# Patient Record
Sex: FEMALE | Race: WHITE | Employment: UNEMPLOYED | URBAN - METROPOLITAN AREA
[De-identification: names, ages, dates, MRNs, and addresses within clinical notes are randomized per-mention and may not be internally consistent; named-entity substitution may affect disease eponyms.]

---

## 2017-09-25 ENCOUNTER — INITIAL CONSULT (OUTPATIENT)
Dept: PULMONOLOGY | Age: 42
End: 2017-09-25

## 2017-09-25 VITALS
WEIGHT: 142 LBS | DIASTOLIC BLOOD PRESSURE: 52 MMHG | BODY MASS INDEX: 23.66 KG/M2 | HEART RATE: 52 BPM | HEIGHT: 65 IN | RESPIRATION RATE: 14 BRPM | SYSTOLIC BLOOD PRESSURE: 98 MMHG

## 2017-09-25 DIAGNOSIS — R91.8 LUNG INFILTRATE ON CT: ICD-10-CM

## 2017-09-25 DIAGNOSIS — R07.9 CHEST PAIN, UNSPECIFIED TYPE: ICD-10-CM

## 2017-09-25 LAB
DLCO %PRED: NORMAL
DLCO PRE: NORMAL
FEF 25-75%-POST: 2.69
FEF 25-75%-PRE: 2.72
FEV1-POST: 2.75
FEV1-PRE: 2.75
FEV1/FVC-POST: 82.6
FEV1/FVC-PRE: 80.2
FVC-POST: 3.33
FVC-PRE: 3.43
MEP: NORMAL
MIP: NORMAL
TLC %PRED: NORMAL
TLC PRE: NORMAL

## 2017-09-25 PROCEDURE — 99204 OFFICE O/P NEW MOD 45 MIN: CPT | Performed by: INTERNAL MEDICINE

## 2017-09-25 PROCEDURE — 94060 EVALUATION OF WHEEZING: CPT | Performed by: INTERNAL MEDICINE

## 2017-09-25 ASSESSMENT — PULMONARY FUNCTION TESTS
FEV1_POST: 2.75
FEV1_PRE: 2.75
FEV1/FVC_PRE: 80.2
FVC_PRE: 3.43
FEV1/FVC_POST: 82.6
FVC_POST: 3.33

## 2017-10-04 ENCOUNTER — HOSPITAL ENCOUNTER (OUTPATIENT)
Dept: CT IMAGING | Age: 42
Discharge: HOME | End: 2017-10-04
Attending: INTERNAL MEDICINE | Admitting: INTERNAL MEDICINE

## 2017-10-04 DIAGNOSIS — R07.9 CHEST PAIN, UNSPECIFIED TYPE: ICD-10-CM

## 2017-10-04 DIAGNOSIS — R91.8 LUNG INFILTRATE ON CT: ICD-10-CM

## 2017-10-04 DIAGNOSIS — R07.9 CHEST PAIN: ICD-10-CM

## 2017-10-04 RX ORDER — 0.9 % SODIUM CHLORIDE 0.9 %
10 VIAL (ML) INJECTION 2 TIMES DAILY
Status: DISCONTINUED | OUTPATIENT
Start: 2017-10-04 | End: 2017-10-05 | Stop reason: HOSPADM

## 2017-10-04 RX ADMIN — Medication 10 ML: at 09:31

## 2017-10-05 ENCOUNTER — TELEPHONE (OUTPATIENT)
Dept: PULMONOLOGY | Age: 42
End: 2017-10-05

## 2017-10-05 ENCOUNTER — HOSPITAL ENCOUNTER (OUTPATIENT)
Dept: LAB | Age: 42
Discharge: HOME | End: 2017-10-05
Attending: INTERNAL MEDICINE | Admitting: INTERNAL MEDICINE

## 2017-10-05 DIAGNOSIS — R91.8 MULTIPLE LUNG NODULES: Primary | ICD-10-CM

## 2017-10-05 NOTE — TELEPHONE ENCOUNTER
I discussed results of the CT chest with Lili Ghosh over the phone. It does appear that she has a nodular density at the Left lower lobe multiple small nodules in that area and at least one in the upper lobe on the Right side. Because there are no old CT for comparison besides the one that was done at [facility] being a CT abdomen I think we need to follow this nodular density closely. I will obtain serology for histoplasmosis and if negative consider a PET/CT versus repeating her chest CT in 3 months.   Fortunately at this time she remains asymptomatic

## 2017-10-08 LAB
HISTOPLASMA ANTIGEN URINE INTERP: NOT DETECTED
HISTOPLASMA ANTIGEN URINE: NOT DETECTED

## 2017-10-09 ENCOUNTER — TELEPHONE (OUTPATIENT)
Dept: PULMONOLOGY | Age: 42
End: 2017-10-09

## 2017-10-09 DIAGNOSIS — R91.1 PULMONARY NODULE, LEFT: Primary | ICD-10-CM

## 2017-10-10 LAB
ASPERGILLUS ANTIBODY ID: NORMAL
BLASTOMYCES ANTIBODY ID: NORMAL
COCCIDIOIDES ANTIBODY ID: NORMAL
HISTOPLASMA AB, ID: NORMAL

## 2017-10-19 ENCOUNTER — HOSPITAL ENCOUNTER (OUTPATIENT)
Dept: GENERAL RADIOLOGY | Age: 42
Discharge: HOME | End: 2017-10-19
Attending: INTERNAL MEDICINE | Admitting: INTERNAL MEDICINE

## 2017-10-19 ENCOUNTER — HOSPITAL ENCOUNTER (OUTPATIENT)
Dept: PET IMAGING | Age: 42
Discharge: HOME | End: 2017-10-19

## 2017-10-19 DIAGNOSIS — R91.1 PULMONARY NODULE, LEFT: ICD-10-CM

## 2017-10-24 ENCOUNTER — OFFICE VISIT (OUTPATIENT)
Dept: PULMONOLOGY | Age: 42
End: 2017-10-24

## 2017-10-24 VITALS
SYSTOLIC BLOOD PRESSURE: 86 MMHG | HEART RATE: 57 BPM | DIASTOLIC BLOOD PRESSURE: 64 MMHG | WEIGHT: 143 LBS | OXYGEN SATURATION: 98 % | RESPIRATION RATE: 16 BRPM | BODY MASS INDEX: 23.82 KG/M2 | HEIGHT: 65 IN

## 2017-10-24 DIAGNOSIS — N94.9 ADNEXAL CYST: ICD-10-CM

## 2017-10-24 DIAGNOSIS — R91.8 LUNG INFILTRATE ON CT: Primary | ICD-10-CM

## 2017-10-24 DIAGNOSIS — R91.1 LUNG NODULE SEEN ON IMAGING STUDY: ICD-10-CM

## 2017-10-24 PROCEDURE — 99213 OFFICE O/P EST LOW 20 MIN: CPT | Performed by: INTERNAL MEDICINE

## 2017-10-25 ENCOUNTER — HOSPITAL ENCOUNTER (OUTPATIENT)
Dept: LAB | Age: 42
Discharge: HOME | End: 2017-10-25
Attending: OBSTETRICS & GYNECOLOGY | Admitting: OBSTETRICS & GYNECOLOGY

## 2017-10-31 ENCOUNTER — TELEPHONE (OUTPATIENT)
Dept: PULMONOLOGY | Age: 42
End: 2017-10-31

## 2017-11-21 ENCOUNTER — OFFICE VISIT (OUTPATIENT)
Dept: PULMONOLOGY | Age: 42
End: 2017-11-21

## 2017-11-21 VITALS
DIASTOLIC BLOOD PRESSURE: 70 MMHG | SYSTOLIC BLOOD PRESSURE: 110 MMHG | HEART RATE: 57 BPM | HEIGHT: 65 IN | OXYGEN SATURATION: 98 % | RESPIRATION RATE: 20 BRPM | WEIGHT: 143 LBS | BODY MASS INDEX: 23.82 KG/M2

## 2017-11-21 DIAGNOSIS — R91.8 LUNG INFILTRATE ON CT: ICD-10-CM

## 2017-11-21 DIAGNOSIS — R91.1 LUNG NODULE SEEN ON IMAGING STUDY: ICD-10-CM

## 2017-11-21 DIAGNOSIS — N94.9 ADNEXAL CYST: ICD-10-CM

## 2017-11-21 DIAGNOSIS — K20.90 ACUTE ESOPHAGITIS: Primary | ICD-10-CM

## 2017-11-21 DIAGNOSIS — R07.9 CHEST PAIN, UNSPECIFIED TYPE: ICD-10-CM

## 2017-11-21 PROCEDURE — 99213 OFFICE O/P EST LOW 20 MIN: CPT | Performed by: INTERNAL MEDICINE

## 2017-11-21 NOTE — PROGRESS NOTES
SUBJECTIVE:Chief complaintAnterior chest pain     Gary Keith states that about a week ago she developed a rash of unknown etiology. She was started on prednisone 20 mg twice a day for 5 days. The rash has faded but then last night she noted the onset of severe anterior nonpleuritic chest pain which did not radiate and did awaken her from sleep. She does not have a past history of reflux or hiatal hernia. The pain slowly faded over an hour. Of course she is concerned because of her recent finding of a nodular left lower lobe infiltrate. She also is undergoing a workup for her adnexal mass. She states that over the weekend she had a slight cough and thought she had a head cold but was not expectorating sputum and denies hemoptysis    OBJECTIVE:  /70   Pulse 57   Resp 20   Ht 5' 4.5\" (1.638 m)   Wt 143 lb (64.9 kg)   LMP 10/02/2017   SpO2 98%   BMI 24.17 kg/m²      Physical Exam:  Constitutional:  She appears well developed and well-nourished. Neck:  Supple, No palpable lymphadenopathy, No JVD  Cardiovascular:  S1, S2 Normal, Regular rhythm, no murmurs or gallops, No pericardial  rubs. Pulmonary:  Breath sounds are clear throughout. I hear no wheezing or rhonchi  Abdomen: Mild epigastric tenderness on palpation, No distention. No organomegaly   Extremities: no edema, No DVT  Neurologic:  Awake and Alert, No focal deficits             ASSESSMENT:    1. Acute esophagitis    2. Lung infiltrate on CT    3. Lung nodule seen on imaging study    4. Adnexal cyst    5. Chest pain, unspecified type          PLAN:  I gave her 3 Tums to take and told her to get Prilosec OTC 20 mg once a day for the next 2-4 weeks and to use Tums on an as-needed basis since I think this pain most likely is esophageal in origin. If her pain persists I'll see her back in repeat her chest imaging.   This does not sound like cardiac pain but this would be in the differential and if the pain recurs then I think she would need a cardiac workup  No Follow-up on file. This dictation was performed with a verbal recognition program and it was checked for errors. It is possible that there are still dictated errors within this office note. Any errors should be brought immediately to my attention for correction. All efforts were made to ensure that this office note is accurate.

## 2018-01-22 ENCOUNTER — TELEPHONE (OUTPATIENT)
Dept: PULMONOLOGY | Age: 43
End: 2018-01-22

## 2018-01-22 DIAGNOSIS — R91.8 LUNG INFILTRATE ON CT: Primary | ICD-10-CM

## 2018-02-01 ENCOUNTER — HOSPITAL ENCOUNTER (OUTPATIENT)
Dept: CT IMAGING | Age: 43
Discharge: HOME | End: 2018-02-01
Attending: INTERNAL MEDICINE | Admitting: INTERNAL MEDICINE

## 2018-02-01 ENCOUNTER — TELEPHONE (OUTPATIENT)
Dept: PULMONOLOGY | Age: 43
End: 2018-02-01

## 2018-02-01 DIAGNOSIS — R91.8 LUNG INFILTRATE: ICD-10-CM

## 2018-02-01 DIAGNOSIS — R91.8 OTHER NONSPECIFIC ABNORMAL FINDING OF LUNG FIELD (CODE): ICD-10-CM

## 2018-05-29 ENCOUNTER — TELEPHONE (OUTPATIENT)
Dept: PULMONOLOGY | Age: 43
End: 2018-05-29

## 2018-10-23 ENCOUNTER — TELEPHONE (OUTPATIENT)
Dept: PULMONOLOGY | Age: 43
End: 2018-10-23

## 2018-10-23 DIAGNOSIS — R91.8 INFILTRATE OF LOWER LOBE OF LEFT LUNG PRESENT ON IMAGING STUDY: Primary | ICD-10-CM

## 2018-11-05 ENCOUNTER — HOSPITAL ENCOUNTER (OUTPATIENT)
Dept: CT IMAGING | Age: 43
Discharge: HOME | End: 2018-11-05
Payer: COMMERCIAL

## 2018-11-05 ENCOUNTER — TELEPHONE (OUTPATIENT)
Dept: PULMONOLOGY | Age: 43
End: 2018-11-05

## 2018-11-05 DIAGNOSIS — R91.8 INFILTRATE OF LOWER LOBE OF LEFT LUNG PRESENT ON IMAGING STUDY: ICD-10-CM

## 2018-11-05 PROCEDURE — 71250 CT THORAX DX C-: CPT

## 2018-11-05 NOTE — TELEPHONE ENCOUNTER
I spoke to Magdy Sosa over the phone concerning her most recent CT chest on 11/5/18. The multiple nodularity in the left lower lung field has remained unchanged. Because of that.  I will plan on repeating her CT chest later next year and no further interventions necessary at this time

## 2019-04-29 ENCOUNTER — TELEPHONE (OUTPATIENT)
Dept: PULMONOLOGY | Age: 44
End: 2019-04-29

## 2019-05-06 ENCOUNTER — OFFICE VISIT (OUTPATIENT)
Dept: PULMONOLOGY | Age: 44
End: 2019-05-06
Payer: COMMERCIAL

## 2019-05-06 VITALS
OXYGEN SATURATION: 99 % | SYSTOLIC BLOOD PRESSURE: 102 MMHG | BODY MASS INDEX: 22.66 KG/M2 | WEIGHT: 136 LBS | HEIGHT: 65 IN | HEART RATE: 77 BPM | DIASTOLIC BLOOD PRESSURE: 70 MMHG

## 2019-05-06 DIAGNOSIS — R91.1 LUNG NODULE SEEN ON IMAGING STUDY: Primary | ICD-10-CM

## 2019-05-06 DIAGNOSIS — R91.8 LUNG INFILTRATE ON CT: ICD-10-CM

## 2019-05-06 PROCEDURE — 99213 OFFICE O/P EST LOW 20 MIN: CPT | Performed by: INTERNAL MEDICINE

## 2019-05-06 NOTE — PROGRESS NOTES
SUBJECTIVE:  Chief Complaint: Left lower lobe lung nodule/infiltrate  It has been over a year since we started following this atypical left lower lobe nodular infiltrate. Nanette Lemus remains asymptomatic but has lost weight and does not have a good explanation. She denies chest pain or chest discomfort. She denies shortness of breath. She has had no fever or chills. OBJECTIVE:  /70   Pulse 77   Ht 5' 4.5\" (1.638 m)   Wt 136 lb (61.7 kg)   SpO2 99%   Breastfeeding? No   BMI 22.98 kg/m²      Physical Exam:  Constitutional:  She appears well developed and well-nourished. Neck:  Supple, No palpable lymphadenopathy, No JVD  Cardiovascular:  S1, S2 Normal, Regular rhythm, no murmurs or gallops, No pericardial  rubs. Pulmonary:  Breath sounds are clear throughout. I hear no wheezing or rhonchi  Abdomen: No epigastric pain, No distention. No organomegaly   Extremities: no edema, No DVT  Neurologic:  Awake and Alert, No focal deficits    Radiology: CT chest last done 11/5/18 showed stable pulmonary nodules within the left lung with no acute pulmonary abnormality  PFT: Office spirometry last done on 9/25/17 demonstrated no significant airways obstruction and no significant response to bronchodilators        ASSESSMENT:    1. Lung nodule seen on imaging study    2. Lung infiltrate on CT          PLAN:   I would like to repeat her CT chest without contrast in the near future. I will continue to follow this nodule for at least 2-3 years. Return in about 6 months (around 11/6/2019) for recheck for lung nodule. This dictation was performed with a verbal recognition program and it was checked for errors. It is possible that there are still dictated errors within this office note. Any errors should be brought immediately to my attention for correction. All efforts were made to ensure that this office note is accurate.

## 2019-05-27 ENCOUNTER — APPOINTMENT (OUTPATIENT)
Dept: ULTRASOUND IMAGING | Age: 44
DRG: 329 | End: 2019-05-27
Payer: COMMERCIAL

## 2019-05-27 ENCOUNTER — APPOINTMENT (OUTPATIENT)
Dept: CT IMAGING | Age: 44
DRG: 329 | End: 2019-05-27
Payer: COMMERCIAL

## 2019-05-27 ENCOUNTER — HOSPITAL ENCOUNTER (INPATIENT)
Age: 44
LOS: 10 days | Discharge: HOME | DRG: 329 | End: 2019-06-08
Attending: EMERGENCY MEDICINE | Admitting: SURGERY
Payer: COMMERCIAL

## 2019-05-27 DIAGNOSIS — N83.201 HEMORRHAGIC CYST OF RIGHT OVARY: ICD-10-CM

## 2019-05-27 DIAGNOSIS — R10.0 ACUTE ABDOMEN: ICD-10-CM

## 2019-05-27 DIAGNOSIS — K57.80 PERFORATED DIVERTICULUM: Primary | ICD-10-CM

## 2019-05-27 DIAGNOSIS — K57.20 PERFORATED DIVERTICULUM OF LARGE INTESTINE: ICD-10-CM

## 2019-05-27 DIAGNOSIS — K56.7 ILEUS (HCC): ICD-10-CM

## 2019-05-27 LAB
ALBUMIN SERPL-MCNC: 3.7 GM/DL (ref 3.4–5)
ALP BLD-CCNC: 51 IU/L (ref 40–129)
ALT SERPL-CCNC: 11 U/L (ref 10–40)
ANION GAP SERPL CALCULATED.3IONS-SCNC: 14 MMOL/L (ref 4–16)
AST SERPL-CCNC: 14 IU/L (ref 15–37)
BACTERIA: NEGATIVE /HPF
BASOPHILS ABSOLUTE: 0 K/CU MM
BASOPHILS RELATIVE PERCENT: 0.2 % (ref 0–1)
BILIRUB SERPL-MCNC: 1.2 MG/DL (ref 0–1)
BILIRUBIN URINE: NEGATIVE MG/DL
BLOOD, URINE: ABNORMAL
BUN BLDV-MCNC: 14 MG/DL (ref 6–23)
CALCIUM SERPL-MCNC: 9.1 MG/DL (ref 8.3–10.6)
CHLORIDE BLD-SCNC: 102 MMOL/L (ref 99–110)
CLARITY: CLEAR
CO2: 22 MMOL/L (ref 21–32)
COLOR: YELLOW
CREAT SERPL-MCNC: 0.7 MG/DL (ref 0.6–1.1)
DIFFERENTIAL TYPE: ABNORMAL
EOSINOPHILS ABSOLUTE: 0 K/CU MM
EOSINOPHILS RELATIVE PERCENT: 0.2 % (ref 0–3)
GFR AFRICAN AMERICAN: >60 ML/MIN/1.73M2
GFR NON-AFRICAN AMERICAN: >60 ML/MIN/1.73M2
GLUCOSE BLD-MCNC: 102 MG/DL (ref 70–99)
GLUCOSE, URINE: NEGATIVE MG/DL
HCG QUALITATIVE: NEGATIVE
HCT VFR BLD CALC: 42.4 % (ref 37–47)
HEMOGLOBIN: 13 GM/DL (ref 12.5–16)
IMMATURE NEUTROPHIL %: 0.5 % (ref 0–0.43)
KETONES, URINE: ABNORMAL MG/DL
LEUKOCYTE ESTERASE, URINE: ABNORMAL
LIPASE: 11 IU/L (ref 13–60)
LYMPHOCYTES ABSOLUTE: 0.8 K/CU MM
LYMPHOCYTES RELATIVE PERCENT: 5 % (ref 24–44)
MCH RBC QN AUTO: 32.3 PG (ref 27–31)
MCHC RBC AUTO-ENTMCNC: 30.7 % (ref 32–36)
MCV RBC AUTO: 105.2 FL (ref 78–100)
MONOCYTES ABSOLUTE: 0.6 K/CU MM
MONOCYTES RELATIVE PERCENT: 3.8 % (ref 0–4)
MUCUS: ABNORMAL HPF
NITRITE URINE, QUANTITATIVE: NEGATIVE
NUCLEATED RBC %: 0 %
PDW BLD-RTO: 12.3 % (ref 11.7–14.9)
PH, URINE: 8 (ref 5–8)
PLATELET # BLD: 206 K/CU MM (ref 140–440)
PMV BLD AUTO: 10.5 FL (ref 7.5–11.1)
POTASSIUM SERPL-SCNC: 3.8 MMOL/L (ref 3.5–5.1)
PROTEIN UA: 30 MG/DL
RBC # BLD: 4.03 M/CU MM (ref 4.2–5.4)
RBC URINE: 1 /HPF (ref 0–6)
SEGMENTED NEUTROPHILS ABSOLUTE COUNT: 15.1 K/CU MM
SEGMENTED NEUTROPHILS RELATIVE PERCENT: 90.3 % (ref 36–66)
SODIUM BLD-SCNC: 138 MMOL/L (ref 135–145)
SPECIFIC GRAVITY UA: 1.02 (ref 1–1.03)
SQUAMOUS EPITHELIAL: 4 /HPF
TOTAL IMMATURE NEUTOROPHIL: 0.09 K/CU MM
TOTAL NUCLEATED RBC: 0 K/CU MM
TOTAL PROTEIN: 6.9 GM/DL (ref 6.4–8.2)
TRICHOMONAS: ABNORMAL /HPF
UROBILINOGEN, URINE: NORMAL MG/DL (ref 0.2–1)
WBC # BLD: 16.7 K/CU MM (ref 4–10.5)
WBC UA: 14 /HPF (ref 0–5)

## 2019-05-27 PROCEDURE — 2580000003 HC RX 258: Performed by: SURGERY

## 2019-05-27 PROCEDURE — 2580000003 HC RX 258: Performed by: PHYSICIAN ASSISTANT

## 2019-05-27 PROCEDURE — 96374 THER/PROPH/DIAG INJ IV PUSH: CPT

## 2019-05-27 PROCEDURE — 83690 ASSAY OF LIPASE: CPT

## 2019-05-27 PROCEDURE — 96365 THER/PROPH/DIAG IV INF INIT: CPT

## 2019-05-27 PROCEDURE — G0378 HOSPITAL OBSERVATION PER HR: HCPCS

## 2019-05-27 PROCEDURE — 96361 HYDRATE IV INFUSION ADD-ON: CPT

## 2019-05-27 PROCEDURE — 74177 CT ABD & PELVIS W/CONTRAST: CPT

## 2019-05-27 PROCEDURE — 80053 COMPREHEN METABOLIC PANEL: CPT

## 2019-05-27 PROCEDURE — 81001 URINALYSIS AUTO W/SCOPE: CPT

## 2019-05-27 PROCEDURE — 84703 CHORIONIC GONADOTROPIN ASSAY: CPT

## 2019-05-27 PROCEDURE — 96375 TX/PRO/DX INJ NEW DRUG ADDON: CPT

## 2019-05-27 PROCEDURE — 36415 COLL VENOUS BLD VENIPUNCTURE: CPT

## 2019-05-27 PROCEDURE — 2580000003 HC RX 258

## 2019-05-27 PROCEDURE — 96376 TX/PRO/DX INJ SAME DRUG ADON: CPT

## 2019-05-27 PROCEDURE — 6360000004 HC RX CONTRAST MEDICATION: Performed by: PHYSICIAN ASSISTANT

## 2019-05-27 PROCEDURE — 99285 EMERGENCY DEPT VISIT HI MDM: CPT

## 2019-05-27 PROCEDURE — 85025 COMPLETE CBC W/AUTO DIFF WBC: CPT

## 2019-05-27 PROCEDURE — 76830 TRANSVAGINAL US NON-OB: CPT

## 2019-05-27 PROCEDURE — 6360000002 HC RX W HCPCS: Performed by: SURGERY

## 2019-05-27 PROCEDURE — 93975 VASCULAR STUDY: CPT

## 2019-05-27 PROCEDURE — 6360000002 HC RX W HCPCS: Performed by: PHYSICIAN ASSISTANT

## 2019-05-27 RX ORDER — HYDROMORPHONE HCL 110MG/55ML
1 PATIENT CONTROLLED ANALGESIA SYRINGE INTRAVENOUS
Status: DISCONTINUED | OUTPATIENT
Start: 2019-05-27 | End: 2019-05-27

## 2019-05-27 RX ORDER — HYDROMORPHONE HCL 110MG/55ML
0.5 PATIENT CONTROLLED ANALGESIA SYRINGE INTRAVENOUS
Status: DISCONTINUED | OUTPATIENT
Start: 2019-05-27 | End: 2019-05-27

## 2019-05-27 RX ORDER — DIPHENHYDRAMINE HCL 25 MG
25 TABLET ORAL ONCE
Status: DISCONTINUED | OUTPATIENT
Start: 2019-05-27 | End: 2019-05-27

## 2019-05-27 RX ORDER — KETOROLAC TROMETHAMINE 30 MG/ML
30 INJECTION, SOLUTION INTRAMUSCULAR; INTRAVENOUS EVERY 6 HOURS
Status: DISCONTINUED | OUTPATIENT
Start: 2019-05-27 | End: 2019-05-27

## 2019-05-27 RX ORDER — MORPHINE SULFATE 4 MG/ML
4 INJECTION, SOLUTION INTRAMUSCULAR; INTRAVENOUS EVERY 30 MIN PRN
Status: DISCONTINUED | OUTPATIENT
Start: 2019-05-27 | End: 2019-05-27

## 2019-05-27 RX ORDER — 0.9 % SODIUM CHLORIDE 0.9 %
1000 INTRAVENOUS SOLUTION INTRAVENOUS ONCE
Status: COMPLETED | OUTPATIENT
Start: 2019-05-27 | End: 2019-05-27

## 2019-05-27 RX ORDER — KETOROLAC TROMETHAMINE 30 MG/ML
30 INJECTION, SOLUTION INTRAMUSCULAR; INTRAVENOUS ONCE
Status: COMPLETED | OUTPATIENT
Start: 2019-05-27 | End: 2019-05-27

## 2019-05-27 RX ORDER — ACETAMINOPHEN 325 MG/1
650 TABLET ORAL EVERY 4 HOURS PRN
Status: DISCONTINUED | OUTPATIENT
Start: 2019-05-27 | End: 2019-06-08 | Stop reason: HOSPADM

## 2019-05-27 RX ORDER — HYDROMORPHONE HCL 110MG/55ML
PATIENT CONTROLLED ANALGESIA SYRINGE INTRAVENOUS
Status: DISPENSED
Start: 2019-05-27 | End: 2019-05-28

## 2019-05-27 RX ORDER — HYDROMORPHONE HCL 110MG/55ML
0.5 PATIENT CONTROLLED ANALGESIA SYRINGE INTRAVENOUS
Status: DISCONTINUED | OUTPATIENT
Start: 2019-05-27 | End: 2019-05-30

## 2019-05-27 RX ORDER — KETOROLAC TROMETHAMINE 30 MG/ML
30 INJECTION, SOLUTION INTRAMUSCULAR; INTRAVENOUS EVERY 6 HOURS
Status: DISCONTINUED | OUTPATIENT
Start: 2019-05-27 | End: 2019-05-30

## 2019-05-27 RX ORDER — METOCLOPRAMIDE HYDROCHLORIDE 5 MG/ML
10 INJECTION INTRAMUSCULAR; INTRAVENOUS EVERY 6 HOURS PRN
Status: DISCONTINUED | OUTPATIENT
Start: 2019-05-27 | End: 2019-06-08 | Stop reason: HOSPADM

## 2019-05-27 RX ORDER — SODIUM CHLORIDE, SODIUM LACTATE, POTASSIUM CHLORIDE, CALCIUM CHLORIDE 600; 310; 30; 20 MG/100ML; MG/100ML; MG/100ML; MG/100ML
INJECTION, SOLUTION INTRAVENOUS CONTINUOUS
Status: DISCONTINUED | OUTPATIENT
Start: 2019-05-27 | End: 2019-05-30

## 2019-05-27 RX ORDER — ONDANSETRON 2 MG/ML
4 INJECTION INTRAMUSCULAR; INTRAVENOUS EVERY 4 HOURS PRN
Status: DISCONTINUED | OUTPATIENT
Start: 2019-05-27 | End: 2019-05-30

## 2019-05-27 RX ORDER — PANTOPRAZOLE SODIUM 40 MG/10ML
40 INJECTION, POWDER, LYOPHILIZED, FOR SOLUTION INTRAVENOUS DAILY
Status: DISCONTINUED | OUTPATIENT
Start: 2019-05-28 | End: 2019-06-08 | Stop reason: HOSPADM

## 2019-05-27 RX ORDER — ONDANSETRON 2 MG/ML
4 INJECTION INTRAMUSCULAR; INTRAVENOUS EVERY 30 MIN PRN
Status: DISCONTINUED | OUTPATIENT
Start: 2019-05-27 | End: 2019-05-27

## 2019-05-27 RX ORDER — 0.9 % SODIUM CHLORIDE 0.9 %
10 VIAL (ML) INJECTION
Status: COMPLETED | OUTPATIENT
Start: 2019-05-27 | End: 2019-05-27

## 2019-05-27 RX ORDER — SODIUM CHLORIDE, SODIUM LACTATE, POTASSIUM CHLORIDE, CALCIUM CHLORIDE 600; 310; 30; 20 MG/100ML; MG/100ML; MG/100ML; MG/100ML
INJECTION, SOLUTION INTRAVENOUS
Status: COMPLETED
Start: 2019-05-27 | End: 2019-05-27

## 2019-05-27 RX ADMIN — ONDANSETRON 4 MG: 2 INJECTION INTRAMUSCULAR; INTRAVENOUS at 16:56

## 2019-05-27 RX ADMIN — SODIUM CHLORIDE 10 ML: 9 INJECTION, SOLUTION INTRAMUSCULAR; INTRAVENOUS; SUBCUTANEOUS at 15:54

## 2019-05-27 RX ADMIN — SODIUM CHLORIDE 1000 ML: 9 INJECTION, SOLUTION INTRAVENOUS at 16:56

## 2019-05-27 RX ADMIN — ONDANSETRON 4 MG: 2 INJECTION INTRAMUSCULAR; INTRAVENOUS at 23:25

## 2019-05-27 RX ADMIN — IOPAMIDOL 75 ML: 755 INJECTION, SOLUTION INTRAVENOUS at 15:54

## 2019-05-27 RX ADMIN — HYDROMORPHONE HYDROCHLORIDE 1 MG: 2 INJECTION, SOLUTION INTRAMUSCULAR; INTRAVENOUS; SUBCUTANEOUS at 18:23

## 2019-05-27 RX ADMIN — KETOROLAC TROMETHAMINE 30 MG: 30 INJECTION, SOLUTION INTRAMUSCULAR; INTRAVENOUS at 18:22

## 2019-05-27 RX ADMIN — SODIUM CHLORIDE, POTASSIUM CHLORIDE, SODIUM LACTATE AND CALCIUM CHLORIDE: 600; 310; 30; 20 INJECTION, SOLUTION INTRAVENOUS at 18:30

## 2019-05-27 RX ADMIN — PIPERACILLIN SODIUM AND TAZOBACTAM SODIUM 3.38 G: 3; .375 INJECTION, POWDER, FOR SOLUTION INTRAVENOUS at 18:21

## 2019-05-27 RX ADMIN — SODIUM CHLORIDE, POTASSIUM CHLORIDE, SODIUM LACTATE AND CALCIUM CHLORIDE: 600; 310; 30; 20 INJECTION, SOLUTION INTRAVENOUS at 19:59

## 2019-05-27 RX ADMIN — MORPHINE SULFATE 4 MG: 4 INJECTION INTRAVENOUS at 16:56

## 2019-05-27 ASSESSMENT — PAIN DESCRIPTION - ORIENTATION: ORIENTATION: LOWER

## 2019-05-27 ASSESSMENT — PAIN DESCRIPTION - PAIN TYPE: TYPE: ACUTE PAIN

## 2019-05-27 ASSESSMENT — PAIN SCALES - GENERAL
PAINLEVEL_OUTOF10: 8
PAINLEVEL_OUTOF10: 9
PAINLEVEL_OUTOF10: 7
PAINLEVEL_OUTOF10: 8

## 2019-05-27 ASSESSMENT — PAIN DESCRIPTION - LOCATION: LOCATION: ABDOMEN

## 2019-05-27 NOTE — H&P
History and Physical    Patient Name: Tamiko Cross                              YOB: 1975  Exam Date: 5/27/2019    PCP:  Ivis Khoury MD           Referring Physician:  ER    Chief Complaint:  Perforated diverticultis    History of Present Illness: The patient is a 38 y.o. female who started with acute onset of pain in the lower abdomen last night. Onset was sudden and she became nauseated immediately. She took some ibuprofen without any relief. Her pain persisted and continued to worsen and she became nauseated. She had episodes of sweats and chills and dizziness. She drove herself to the ER for evaluation.      Past Medical History:   Diagnosis Date    Acute esophagitis 11/21/2017    Adnexal cyst 10/24/2017    Chest pain 09/25/2017    \"a couple of yrs ago had some chest pain- did echo and everything was fine\"    History of kidney stones     \"3 or 4 yrs ago- tx with Lithotripsy- had to do two in a row\"/10/2017\"Pet scan did show I have 2 small stones now\"    Lung infiltrate on CT 9/25/2017    Lung nodule seen on imaging study 10/24/2017    scheduled for lung bx 10/25/2017\"\"[address] had side flank pain- thought kidney stone- had CT scan done 9/2017-showed shadow on left lower lung- sent to Dr Sean Thorne- did CT of chest showed nodule in both lungs- multiple on left an one right side\"    Prolonged emergence from general anesthesia     \"do have trouble waking up after surgery\"      Past Surgical History:   Procedure Laterality Date    LITHOTRIPSY  2013   [address]      Prior to Admission Medications  None    Allergies   Allergen Reactions    Tetracyclines & Related Rash        Social History     Tobacco Use    Smoking status: Never Smoker    Smokeless tobacco: Never Used   Substance Use Topics    Alcohol use: No      Family History   Problem Relation Age of Onset    Stroke Father         TIA    High Blood Pressure Father       Physical Exam:  /64   Pulse 71   Temp 98.4 °F (36.9 °C) (Oral)   Resp 16   Ht 5' 4.5\" (1.638 m)   Wt 127 lb (57.6 kg)   LMP 05/24/2019   SpO2 100%   BMI 21.46 kg/m²   Pt is awake, alert, oriented to person, place and time, appropriate with exam  HEENT:  Has non-icteric sclerae, no JVD  CTA bilaterally, with good excursion  RRR, no murmurs appreciated  ABDOMEN:  Soft, tender LLQ and supra-pubic, +rebound, +peritonitis, focal guarding  Ext:  Warm    Imaging:  CT Scan:  Yes, 5/27/2019  Impression   1. Colonic diverticulosis with stranding and inflammatory changes noted   within the left lower quadrant of the abdomen. Wilhelmena Rasher is also an adjacent   short segment of small bowel demonstrating wall thickening with mild   prominence of the more proximal small bowel which is favored to reflect ileus   given the inflammatory changes within the pelvis.  Small amount of   pneumoperitoneum also identified within the abdomen.  Constellation of   findings is most suggestive of perforated diverticulitis.  No organized   drainable fluid collection identified. 2. The appendix is not identified with confidence, however, what is favored   to reflect the appendix is normal in caliber with no surrounding inflammatory   changes identified within the right lower quadrant to suggest acute   appendicitis. Labs:  CBC:    Lab Results   Component Value Date    WBC 16.7 05/27/2019    HGB 13.0 05/27/2019    HCT 42.4 05/27/2019     05/27/2019     BMP:    Lab Results   Component Value Date     05/27/2019    K 3.8 05/27/2019     05/27/2019    CO2 22 05/27/2019    BUN 14 05/27/2019    CREATININE 0.7 05/27/2019    CALCIUM 9.1 05/27/2019     LFT:    Lab Results   Component Value Date    LABALBU 3.7 05/27/2019    BILITOT 1.2 05/27/2019    AST 14 05/27/2019    ALT 11 05/27/2019    ALKPHOS 51 05/27/2019       Assessment and Plan:  The patient presents with signs and symptoms consistent with complicated/perforated diverticultis.  Will admit and keep NPO

## 2019-05-27 NOTE — ED NOTES
Report given to Jovanny Simpson. No further querstions at this time.      Billy Greer RN  05/27/19 9664

## 2019-05-27 NOTE — ED PROVIDER NOTES
Minutes per session: None    Stress: None   Relationships    Social connections:     Talks on phone: None     Gets together: None     Attends Scientologist service: None     Active member of club or organization: None     Attends meetings of clubs or organizations: None     Relationship status: None    Intimate partner violence:     Fear of current or ex partner: None     Emotionally abused: None     Physically abused: None     Forced sexual activity: None   Other Topics Concern    None   Social History Narrative    None     Family History   Problem Relation Age of Onset    Stroke Father         TIA    High Blood Pressure Father        PHYSICAL EXAM    VITAL SIGNS: /64   Pulse 71   Temp 98.4 °F (36.9 °C) (Oral)   Resp 16   Ht 5' 4.5\" (1.638 m)   Wt 127 lb (57.6 kg)   LMP 05/24/2019   SpO2 100%   BMI 21.46 kg/m²   General:  Well developed, thin well nourished female, mildly uncomfortable appearing, nontoxic   Eyes:  Sclera nonicteric, Conjunctiva moist, No discharge  Head:  Normocephalic, Atramautic  Neck/Lymphatics: Supple, no JVD, no swollen nodes  Respiratory:  Clear to ausculation bilaterally, No retractions, Non labored breathing  Cardiovascular:  RRR, No murmurs/gallops/thrills  GI:   No gross discoloration. Bowel sounds present in all quadrants, No audible bruits. Soft,  Nondistended. Moderate to severe suprapubic and right lower quadrant abdominal tenderness with guarding. Mild tenderness in the left lower quadrant. Negative hepatosplenomegaly, negative Daniels sign. No palpable pulsatile masses or obvious hernias. Back:  No CVA tenderness to percussion.   Musculoskeletal:  No edema, No deformity  Peripheral Vascular: Distal pulses 2+ equal bilaterally  Integument: No rash, Normal turgor  Neurologic:  Alert & oriented, Normal speech  Psychiatric: Cooperative, pleasant affect       I have reviewed and interpreted all of the currently available lab results from this visit (if Urine 8.0 5.0 - 8.0    Protein, UA 30 (A) NEGATIVE MG/DL    Urobilinogen, Urine NORMAL 0.2 - 1.0 MG/DL    Nitrite Urine, Quantitative NEGATIVE NEGATIVE    Leukocyte Esterase, Urine TRACE (A) NEGATIVE    RBC, UA 1 0 - 6 /HPF    WBC, UA 14 (H) 0 - 5 /HPF    Bacteria, UA NEGATIVE NEGATIVE /HPF    Squam Epithel, UA 4 /HPF    Mucus, UA RARE (A) NEGATIVE HPF    Trichomonas, UA NONE SEEN NONE SEEN /HPF   HCG Qualitative, Serum   Result Value Ref Range    hCG Qual NEGATIVE         RADIOLOGY/PROCEDURES            US DUP ABD PEL RETRO SCROT COMPLETE (Final result)   Result time 05/27/19 17:09:04   Final result by Fritz Warner MD (05/27/19 17:09:04)                Impression:    Retroverted uterus. Hemorrhagic appearing right ovarian cyst 1.7 cm x 1.4 cm x 1.8 cm.  No  follow-up required. Nonvisualization left ovary. Narrative:    EXAMINATION:  PELVIC ULTRASOUND    5/27/2019    TECHNIQUE:  Transvaginal pelvic ultrasound was performed.  Color Doppler evaluation was  performed. COMPARISON:  None    HISTORY:  ORDERING SYSTEM PROVIDED HISTORY: right pelvic pain, h/o partial  hysterectomy, eval for torsion  TECHNOLOGIST PROVIDED HISTORY:  Acuity: Acute  Type of Exam: Initial    FINDINGS:    Measurements:    Uterus:  7.9 x 4.9 x 5.9 cm    Endometrial stripe:  6 mm      Ultrasound Findings:    Uterus: Uterus demonstrates normal myometrial echotexture.  The uterus is  retroverted. Endometrial stripe: Endometrial stripe is within normal limits. Right Ovary: The right ovary demonstrates a complex hemorrhagic appearing  cyst measuring 1.7 x 1.4 x 1.8 cm.  There is normal venous and arterial flow  to the ovary.     Left Ovary:  Not identified    Free Fluid: No evidence of free fluid.                      US NON OB TRANSVAGINAL (Edited Result - FINAL)   Result time 05/27/19 17:09:04   Edited Result - FINAL by Fritz Warner MD (05/27/19 17:09:04)                Impression:    Retroverted with no surrounding inflammatory change identified. No inflammatory process of the right lower quadrant to suggest acute  appendicitis.  Note is made of colonic diverticulosis with stranding and  inflammatory changes throughout the left pelvis.  Adjacent loop of small  bowel with focal circumferential wall thickening is also noted with mildly  prominent small bowel which is normal in diameter measuring up to 3 cm. Additionally, several locules of free intraperitoneal gas are noted within  the abdomen predominantly anteriorly and within the superior abdomen. Findings most suggestive of acute perforated diverticulitis with possible  reactive inflammatory changes of the adjacent small bowel and mild ileus.  No  organized drainable fluid collections are identified. Pelvis: The urinary bladder appears unremarkable.  The pelvic organs  demonstrate no acute abnormality. Peritoneum/Retroperitoneum: The abdominal aorta is normal in caliber with no  significant atherosclerotic disease.  No retroperitoneal adenopathy.  No  significant free fluid identified within the abdomen.  A small amount of  pneumoperitoneum is present. Bones/Soft Tissues: No significant bony or soft tissue abnormality is  demonstrated.                          ED COURSE & MEDICAL DECISION MAKING      Vital signs and nursing notes reviewed during ED course. I have independently evaluated this patient . Supervising MD - Dr Luisito Galan - present in the Emergency Department, available for consultation, throughout entirety of  patient care. All pertinent Lab data and radiographic results reviewed with patient at bedside. The patient and / or the family were informed of the results of any tests, a time was given to answer questions, a plan was proposed and they agreed with plan.          Differential diagnosis: Abdominal Aortic Aneurysm, Ischemic Bowel, Bowel Obstruction, Acute Cholecystitis, Acute Appendicitis, other    Clinical  IMPRESSION

## 2019-05-27 NOTE — ED PROVIDER NOTES
I independently examined and evaluated Josué Tejeda. In brief, Patient here with abdominal pain, lower. Focused exam revealed Resting comfortably, abdomen is nondistended. ED course: Workup initiated she has a leukocytosis of 16.7, CT shows diverticulitis with a small perforation, antibiotics initiated surgery notified she will be admitted for further workup and treatment. All diagnostic, treatment, and disposition decisions were made by myself in conjunction with the advanced practice provider. For all further details of the patient's emergency department visit, please see the advanced practice provider's documentation. Comment: Please note this report has been produced using speech recognition software and may contain errors related to that system including errors in grammar, punctuation, and spelling, as well as words and phrases that may be inappropriate. If there are any questions or concerns please feel free to contact the dictating provider for clarification.         Monse Mckeon MD  05/27/19 8729

## 2019-05-28 LAB
ANION GAP SERPL CALCULATED.3IONS-SCNC: 11 MMOL/L (ref 4–16)
BUN BLDV-MCNC: 14 MG/DL (ref 6–23)
CALCIUM SERPL-MCNC: 8.7 MG/DL (ref 8.3–10.6)
CHLORIDE BLD-SCNC: 103 MMOL/L (ref 99–110)
CO2: 24 MMOL/L (ref 21–32)
CREAT SERPL-MCNC: 0.7 MG/DL (ref 0.6–1.1)
GFR AFRICAN AMERICAN: >60 ML/MIN/1.73M2
GFR NON-AFRICAN AMERICAN: >60 ML/MIN/1.73M2
GLUCOSE BLD-MCNC: 99 MG/DL (ref 70–99)
HCT VFR BLD CALC: 36.5 % (ref 37–47)
HEMOGLOBIN: 11.3 GM/DL (ref 12.5–16)
MCH RBC QN AUTO: 31.4 PG (ref 27–31)
MCHC RBC AUTO-ENTMCNC: 31 % (ref 32–36)
MCV RBC AUTO: 101.4 FL (ref 78–100)
PDW BLD-RTO: 12.5 % (ref 11.7–14.9)
PLATELET # BLD: 158 K/CU MM (ref 140–440)
PMV BLD AUTO: 11.2 FL (ref 7.5–11.1)
POTASSIUM SERPL-SCNC: 4.1 MMOL/L (ref 3.5–5.1)
PREALBUMIN: ABNORMAL MG/DL (ref 20–40)
RBC # BLD: 3.6 M/CU MM (ref 4.2–5.4)
SODIUM BLD-SCNC: 138 MMOL/L (ref 135–145)
WBC # BLD: 10.9 K/CU MM (ref 4–10.5)

## 2019-05-28 PROCEDURE — 96376 TX/PRO/DX INJ SAME DRUG ADON: CPT

## 2019-05-28 PROCEDURE — 85027 COMPLETE CBC AUTOMATED: CPT

## 2019-05-28 PROCEDURE — 36415 COLL VENOUS BLD VENIPUNCTURE: CPT

## 2019-05-28 PROCEDURE — 2580000003 HC RX 258: Performed by: SURGERY

## 2019-05-28 PROCEDURE — 96366 THER/PROPH/DIAG IV INF ADDON: CPT

## 2019-05-28 PROCEDURE — 84134 ASSAY OF PREALBUMIN: CPT

## 2019-05-28 PROCEDURE — 6360000002 HC RX W HCPCS: Performed by: SURGERY

## 2019-05-28 PROCEDURE — G0378 HOSPITAL OBSERVATION PER HR: HCPCS

## 2019-05-28 PROCEDURE — 80048 BASIC METABOLIC PNL TOTAL CA: CPT

## 2019-05-28 PROCEDURE — C9113 INJ PANTOPRAZOLE SODIUM, VIA: HCPCS | Performed by: SURGERY

## 2019-05-28 PROCEDURE — 96375 TX/PRO/DX INJ NEW DRUG ADDON: CPT

## 2019-05-28 RX ORDER — SIMETHICONE 125 MG
125 TABLET,CHEWABLE ORAL EVERY 6 HOURS PRN
Status: DISCONTINUED | OUTPATIENT
Start: 2019-05-28 | End: 2019-05-31

## 2019-05-28 RX ADMIN — HYDROMORPHONE HYDROCHLORIDE 0.5 MG: 2 INJECTION, SOLUTION INTRAMUSCULAR; INTRAVENOUS; SUBCUTANEOUS at 20:54

## 2019-05-28 RX ADMIN — PANTOPRAZOLE SODIUM 40 MG: 40 INJECTION, POWDER, FOR SOLUTION INTRAVENOUS at 12:46

## 2019-05-28 RX ADMIN — ONDANSETRON 4 MG: 2 INJECTION INTRAMUSCULAR; INTRAVENOUS at 04:12

## 2019-05-28 RX ADMIN — HYDROMORPHONE HYDROCHLORIDE 0.5 MG: 2 INJECTION, SOLUTION INTRAMUSCULAR; INTRAVENOUS; SUBCUTANEOUS at 09:06

## 2019-05-28 RX ADMIN — PIPERACILLIN SODIUM,TAZOBACTAM SODIUM 3.38 G: 3; .375 INJECTION, POWDER, FOR SOLUTION INTRAVENOUS at 12:47

## 2019-05-28 RX ADMIN — PIPERACILLIN AND TAZOBACTAM 3.38 G: 3; .375 INJECTION, POWDER, FOR SOLUTION INTRAVENOUS at 00:22

## 2019-05-28 RX ADMIN — ONDANSETRON 4 MG: 2 INJECTION INTRAMUSCULAR; INTRAVENOUS at 15:05

## 2019-05-28 RX ADMIN — ONDANSETRON 4 MG: 2 INJECTION INTRAMUSCULAR; INTRAVENOUS at 20:54

## 2019-05-28 RX ADMIN — KETOROLAC TROMETHAMINE 30 MG: 30 INJECTION, SOLUTION INTRAMUSCULAR; INTRAVENOUS at 06:19

## 2019-05-28 RX ADMIN — PIPERACILLIN SODIUM,TAZOBACTAM SODIUM 3.38 G: 3; .375 INJECTION, POWDER, FOR SOLUTION INTRAVENOUS at 06:19

## 2019-05-28 RX ADMIN — KETOROLAC TROMETHAMINE 30 MG: 30 INJECTION, SOLUTION INTRAMUSCULAR; INTRAVENOUS at 00:16

## 2019-05-28 RX ADMIN — KETOROLAC TROMETHAMINE 30 MG: 30 INJECTION, SOLUTION INTRAMUSCULAR; INTRAVENOUS at 12:46

## 2019-05-28 RX ADMIN — SODIUM CHLORIDE, POTASSIUM CHLORIDE, SODIUM LACTATE AND CALCIUM CHLORIDE: 600; 310; 30; 20 INJECTION, SOLUTION INTRAVENOUS at 21:04

## 2019-05-28 RX ADMIN — HYDROMORPHONE HYDROCHLORIDE 0.5 MG: 2 INJECTION, SOLUTION INTRAMUSCULAR; INTRAVENOUS; SUBCUTANEOUS at 04:12

## 2019-05-28 RX ADMIN — PIPERACILLIN SODIUM,TAZOBACTAM SODIUM 3.38 G: 3; .375 INJECTION, POWDER, FOR SOLUTION INTRAVENOUS at 17:59

## 2019-05-28 RX ADMIN — KETOROLAC TROMETHAMINE 30 MG: 30 INJECTION, SOLUTION INTRAMUSCULAR; INTRAVENOUS at 18:01

## 2019-05-28 RX ADMIN — ONDANSETRON 4 MG: 2 INJECTION INTRAMUSCULAR; INTRAVENOUS at 09:05

## 2019-05-28 RX ADMIN — SODIUM CHLORIDE, POTASSIUM CHLORIDE, SODIUM LACTATE AND CALCIUM CHLORIDE: 600; 310; 30; 20 INJECTION, SOLUTION INTRAVENOUS at 13:57

## 2019-05-28 RX ADMIN — SODIUM CHLORIDE, POTASSIUM CHLORIDE, SODIUM LACTATE AND CALCIUM CHLORIDE: 600; 310; 30; 20 INJECTION, SOLUTION INTRAVENOUS at 04:25

## 2019-05-28 ASSESSMENT — PAIN DESCRIPTION - DESCRIPTORS
DESCRIPTORS: ACHING;CRAMPING;CONSTANT
DESCRIPTORS: ACHING;CRAMPING
DESCRIPTORS: ACHING;CRAMPING;DISCOMFORT
DESCRIPTORS: ACHING;CRAMPING;DISCOMFORT

## 2019-05-28 ASSESSMENT — PAIN - FUNCTIONAL ASSESSMENT
PAIN_FUNCTIONAL_ASSESSMENT: ACTIVITIES ARE NOT PREVENTED

## 2019-05-28 ASSESSMENT — PAIN DESCRIPTION - FREQUENCY
FREQUENCY: CONTINUOUS

## 2019-05-28 ASSESSMENT — PAIN SCALES - GENERAL
PAINLEVEL_OUTOF10: 6
PAINLEVEL_OUTOF10: 7
PAINLEVEL_OUTOF10: 5
PAINLEVEL_OUTOF10: 9
PAINLEVEL_OUTOF10: 0
PAINLEVEL_OUTOF10: 7
PAINLEVEL_OUTOF10: 7
PAINLEVEL_OUTOF10: 6

## 2019-05-28 ASSESSMENT — PAIN DESCRIPTION - ORIENTATION
ORIENTATION: MID;LOWER

## 2019-05-28 ASSESSMENT — PAIN DESCRIPTION - PROGRESSION
CLINICAL_PROGRESSION: NOT CHANGED

## 2019-05-28 ASSESSMENT — PAIN DESCRIPTION - PAIN TYPE
TYPE: ACUTE PAIN

## 2019-05-28 ASSESSMENT — PAIN DESCRIPTION - ONSET
ONSET: ON-GOING

## 2019-05-28 ASSESSMENT — PAIN DESCRIPTION - LOCATION
LOCATION: ABDOMEN

## 2019-05-28 NOTE — PLAN OF CARE
decrease  Description  Pain level will decrease  Outcome: Ongoing     Problem: Pain:  Goal: Pain level will decrease  Description  Pain level will decrease  Outcome: Ongoing  Goal: Control of acute pain  Description  Control of acute pain  Outcome: Ongoing  Goal: Control of chronic pain  Description  Control of chronic pain  Outcome: Ongoing

## 2019-05-28 NOTE — CARE COORDINATION
Reviewed chart and spoke with pt about discharge needs/plans. Pt lives with her family, PTA she was independent with ADL's and transportation. She has a PCP and insurance that covers medications. Denies any needs at this time. Patients white board updated and  card provided to pt/family.

## 2019-05-28 NOTE — CONSULTS
Nutrition Education    Type and Reason for Visit: Initial, Consult    Patient in the shower upon consult. Spoke to pt's nurse and asked that she let pt know I stopped by and left education for her on table next to her bed. Provided pt with low-fiber diet education and healthy protein sources handouts. Provided individualized notes on handouts concerning how much protein/carbohydrates to consume pre and post workout to ensure adequate intake and LBM maintenance. Provided pt with email and number should she have further questions. · Written educational materials provided. · Contact name and number provided. · Refer to Patient Education activity for more details.     Electronically signed by Ernst Cruz RD, LD on 5/28/19 at 2:40 PM    Contact Number: [phone]

## 2019-05-29 ENCOUNTER — APPOINTMENT (OUTPATIENT)
Dept: GENERAL RADIOLOGY | Age: 44
DRG: 329 | End: 2019-05-29
Payer: COMMERCIAL

## 2019-05-29 ENCOUNTER — APPOINTMENT (OUTPATIENT)
Dept: CT IMAGING | Age: 44
DRG: 329 | End: 2019-05-29
Payer: COMMERCIAL

## 2019-05-29 LAB
ANION GAP SERPL CALCULATED.3IONS-SCNC: 13 MMOL/L (ref 4–16)
APTT: 32.3 SECONDS (ref 21.2–33)
BUN BLDV-MCNC: 17 MG/DL (ref 6–23)
CALCIUM SERPL-MCNC: 8.3 MG/DL (ref 8.3–10.6)
CHLORIDE BLD-SCNC: 104 MMOL/L (ref 99–110)
CO2: 23 MMOL/L (ref 21–32)
CREAT SERPL-MCNC: 0.8 MG/DL (ref 0.6–1.1)
GFR AFRICAN AMERICAN: >60 ML/MIN/1.73M2
GFR NON-AFRICAN AMERICAN: >60 ML/MIN/1.73M2
GLUCOSE BLD-MCNC: 77 MG/DL (ref 70–99)
HCT VFR BLD CALC: 37 % (ref 37–47)
HEMOGLOBIN: 11.5 GM/DL (ref 12.5–16)
INR BLD: 1.12 INDEX
MCH RBC QN AUTO: 31.6 PG (ref 27–31)
MCHC RBC AUTO-ENTMCNC: 31.1 % (ref 32–36)
MCV RBC AUTO: 101.6 FL (ref 78–100)
PDW BLD-RTO: 12.2 % (ref 11.7–14.9)
PLATELET # BLD: 163 K/CU MM (ref 140–440)
PMV BLD AUTO: 11.5 FL (ref 7.5–11.1)
POTASSIUM SERPL-SCNC: 3.9 MMOL/L (ref 3.5–5.1)
PROTHROMBIN TIME: 12.7 SECONDS (ref 9.12–12.5)
RBC # BLD: 3.64 M/CU MM (ref 4.2–5.4)
SODIUM BLD-SCNC: 140 MMOL/L (ref 135–145)
TSH HIGH SENSITIVITY: 5.41 UIU/ML (ref 0.27–4.2)
WBC # BLD: 8.5 K/CU MM (ref 4–10.5)

## 2019-05-29 PROCEDURE — 74018 RADEX ABDOMEN 1 VIEW: CPT

## 2019-05-29 PROCEDURE — 85027 COMPLETE CBC AUTOMATED: CPT

## 2019-05-29 PROCEDURE — 2709999900 HC NON-CHARGEABLE SUPPLY

## 2019-05-29 PROCEDURE — 6360000002 HC RX W HCPCS: Performed by: SURGERY

## 2019-05-29 PROCEDURE — 2580000003 HC RX 258: Performed by: SURGERY

## 2019-05-29 PROCEDURE — 0W9G3ZX DRAINAGE OF PERITONEAL CAVITY, PERCUTANEOUS APPROACH, DIAGNOSTIC: ICD-10-PCS | Performed by: RADIOLOGY

## 2019-05-29 PROCEDURE — 94761 N-INVAS EAR/PLS OXIMETRY MLT: CPT

## 2019-05-29 PROCEDURE — 1200000000 HC SEMI PRIVATE

## 2019-05-29 PROCEDURE — C9113 INJ PANTOPRAZOLE SODIUM, VIA: HCPCS | Performed by: SURGERY

## 2019-05-29 PROCEDURE — 84443 ASSAY THYROID STIM HORMONE: CPT

## 2019-05-29 PROCEDURE — 87073 CULTURE BACTERIA ANAEROBIC: CPT

## 2019-05-29 PROCEDURE — 85610 PROTHROMBIN TIME: CPT

## 2019-05-29 PROCEDURE — 94150 VITAL CAPACITY TEST: CPT

## 2019-05-29 PROCEDURE — 96376 TX/PRO/DX INJ SAME DRUG ADON: CPT

## 2019-05-29 PROCEDURE — 87205 SMEAR GRAM STAIN: CPT

## 2019-05-29 PROCEDURE — 94640 AIRWAY INHALATION TREATMENT: CPT

## 2019-05-29 PROCEDURE — 10140 I&D HMTMA SEROMA/FLUID COLLJ: CPT

## 2019-05-29 PROCEDURE — 74177 CT ABD & PELVIS W/CONTRAST: CPT

## 2019-05-29 PROCEDURE — 36415 COLL VENOUS BLD VENIPUNCTURE: CPT

## 2019-05-29 PROCEDURE — 6360000004 HC RX CONTRAST MEDICATION: Performed by: SURGERY

## 2019-05-29 PROCEDURE — 80048 BASIC METABOLIC PNL TOTAL CA: CPT

## 2019-05-29 PROCEDURE — 85730 THROMBOPLASTIN TIME PARTIAL: CPT

## 2019-05-29 PROCEDURE — 6370000000 HC RX 637 (ALT 250 FOR IP): Performed by: SURGERY

## 2019-05-29 PROCEDURE — 87071 CULTURE AEROBIC QUANT OTHER: CPT

## 2019-05-29 RX ORDER — IPRATROPIUM BROMIDE AND ALBUTEROL SULFATE 2.5; .5 MG/3ML; MG/3ML
1 SOLUTION RESPIRATORY (INHALATION)
Status: DISCONTINUED | OUTPATIENT
Start: 2019-05-29 | End: 2019-06-07

## 2019-05-29 RX ORDER — DEXTROSE AND SODIUM CHLORIDE 5; .45 G/100ML; G/100ML
INJECTION, SOLUTION INTRAVENOUS CONTINUOUS
Status: DISCONTINUED | OUTPATIENT
Start: 2019-05-29 | End: 2019-06-01

## 2019-05-29 RX ORDER — 0.9 % SODIUM CHLORIDE 0.9 %
10 VIAL (ML) INJECTION
Status: COMPLETED | OUTPATIENT
Start: 2019-05-29 | End: 2019-05-29

## 2019-05-29 RX ADMIN — HYDROMORPHONE HYDROCHLORIDE 0.5 MG: 2 INJECTION, SOLUTION INTRAMUSCULAR; INTRAVENOUS; SUBCUTANEOUS at 01:15

## 2019-05-29 RX ADMIN — HYDROMORPHONE HYDROCHLORIDE 0.5 MG: 2 INJECTION, SOLUTION INTRAMUSCULAR; INTRAVENOUS; SUBCUTANEOUS at 22:23

## 2019-05-29 RX ADMIN — IPRATROPIUM BROMIDE AND ALBUTEROL SULFATE 1 AMPULE: .5; 3 SOLUTION RESPIRATORY (INHALATION) at 20:08

## 2019-05-29 RX ADMIN — PIPERACILLIN SODIUM,TAZOBACTAM SODIUM 3.38 G: 3; .375 INJECTION, POWDER, FOR SOLUTION INTRAVENOUS at 13:52

## 2019-05-29 RX ADMIN — SODIUM CHLORIDE, POTASSIUM CHLORIDE, SODIUM LACTATE AND CALCIUM CHLORIDE: 600; 310; 30; 20 INJECTION, SOLUTION INTRAVENOUS at 14:00

## 2019-05-29 RX ADMIN — PIPERACILLIN SODIUM,TAZOBACTAM SODIUM 3.38 G: 3; .375 INJECTION, POWDER, FOR SOLUTION INTRAVENOUS at 01:08

## 2019-05-29 RX ADMIN — HYDROMORPHONE HYDROCHLORIDE 0.5 MG: 2 INJECTION, SOLUTION INTRAMUSCULAR; INTRAVENOUS; SUBCUTANEOUS at 05:53

## 2019-05-29 RX ADMIN — SODIUM CHLORIDE 10 ML: 9 INJECTION, SOLUTION INTRAMUSCULAR; INTRAVENOUS; SUBCUTANEOUS at 09:33

## 2019-05-29 RX ADMIN — KETOROLAC TROMETHAMINE 30 MG: 30 INJECTION, SOLUTION INTRAMUSCULAR; INTRAVENOUS at 02:33

## 2019-05-29 RX ADMIN — PANTOPRAZOLE SODIUM 40 MG: 40 INJECTION, POWDER, FOR SOLUTION INTRAVENOUS at 09:44

## 2019-05-29 RX ADMIN — ONDANSETRON 4 MG: 2 INJECTION INTRAMUSCULAR; INTRAVENOUS at 13:52

## 2019-05-29 RX ADMIN — DEXTROSE AND SODIUM CHLORIDE: 5; 450 INJECTION, SOLUTION INTRAVENOUS at 22:22

## 2019-05-29 RX ADMIN — ONDANSETRON 4 MG: 2 INJECTION INTRAMUSCULAR; INTRAVENOUS at 05:53

## 2019-05-29 RX ADMIN — HYDROMORPHONE HYDROCHLORIDE 0.5 MG: 2 INJECTION, SOLUTION INTRAMUSCULAR; INTRAVENOUS; SUBCUTANEOUS at 13:52

## 2019-05-29 RX ADMIN — ONDANSETRON 4 MG: 2 INJECTION INTRAMUSCULAR; INTRAVENOUS at 22:23

## 2019-05-29 RX ADMIN — PIPERACILLIN SODIUM,TAZOBACTAM SODIUM 3.38 G: 3; .375 INJECTION, POWDER, FOR SOLUTION INTRAVENOUS at 06:01

## 2019-05-29 RX ADMIN — KETOROLAC TROMETHAMINE 30 MG: 30 INJECTION, SOLUTION INTRAMUSCULAR; INTRAVENOUS at 09:44

## 2019-05-29 RX ADMIN — KETOROLAC TROMETHAMINE 30 MG: 30 INJECTION, SOLUTION INTRAMUSCULAR; INTRAVENOUS at 20:16

## 2019-05-29 RX ADMIN — PIPERACILLIN SODIUM,TAZOBACTAM SODIUM 3.38 G: 3; .375 INJECTION, POWDER, FOR SOLUTION INTRAVENOUS at 20:15

## 2019-05-29 RX ADMIN — KETOROLAC TROMETHAMINE 30 MG: 30 INJECTION, SOLUTION INTRAMUSCULAR; INTRAVENOUS at 15:37

## 2019-05-29 RX ADMIN — ONDANSETRON 4 MG: 2 INJECTION INTRAMUSCULAR; INTRAVENOUS at 01:15

## 2019-05-29 RX ADMIN — IOPAMIDOL 75 ML: 755 INJECTION, SOLUTION INTRAVENOUS at 09:30

## 2019-05-29 RX ADMIN — IPRATROPIUM BROMIDE AND ALBUTEROL SULFATE 1 AMPULE: .5; 3 SOLUTION RESPIRATORY (INHALATION) at 15:01

## 2019-05-29 ASSESSMENT — PAIN DESCRIPTION - PROGRESSION
CLINICAL_PROGRESSION: GRADUALLY WORSENING
CLINICAL_PROGRESSION: NOT CHANGED
CLINICAL_PROGRESSION: GRADUALLY IMPROVING
CLINICAL_PROGRESSION: GRADUALLY WORSENING

## 2019-05-29 ASSESSMENT — PAIN DESCRIPTION - ONSET
ONSET: ON-GOING

## 2019-05-29 ASSESSMENT — PAIN DESCRIPTION - FREQUENCY
FREQUENCY: INTERMITTENT
FREQUENCY: CONTINUOUS

## 2019-05-29 ASSESSMENT — PAIN - FUNCTIONAL ASSESSMENT
PAIN_FUNCTIONAL_ASSESSMENT: PREVENTS OR INTERFERES WITH ALL ACTIVE AND SOME PASSIVE ACTIVITIES
PAIN_FUNCTIONAL_ASSESSMENT: PREVENTS OR INTERFERES WITH ALL ACTIVE AND SOME PASSIVE ACTIVITIES
PAIN_FUNCTIONAL_ASSESSMENT: ACTIVITIES ARE NOT PREVENTED
PAIN_FUNCTIONAL_ASSESSMENT: PREVENTS OR INTERFERES SOME ACTIVE ACTIVITIES AND ADLS
PAIN_FUNCTIONAL_ASSESSMENT: PREVENTS OR INTERFERES SOME ACTIVE ACTIVITIES AND ADLS
PAIN_FUNCTIONAL_ASSESSMENT: ACTIVITIES ARE NOT PREVENTED

## 2019-05-29 ASSESSMENT — PAIN DESCRIPTION - ORIENTATION
ORIENTATION: LOWER

## 2019-05-29 ASSESSMENT — PAIN DESCRIPTION - LOCATION
LOCATION: ABDOMEN

## 2019-05-29 ASSESSMENT — PAIN DESCRIPTION - PAIN TYPE
TYPE: ACUTE PAIN

## 2019-05-29 ASSESSMENT — PAIN SCALES - GENERAL
PAINLEVEL_OUTOF10: 7
PAINLEVEL_OUTOF10: 4
PAINLEVEL_OUTOF10: 7
PAINLEVEL_OUTOF10: 6
PAINLEVEL_OUTOF10: 6
PAINLEVEL_OUTOF10: 8
PAINLEVEL_OUTOF10: 6
PAINLEVEL_OUTOF10: 6

## 2019-05-29 ASSESSMENT — PAIN DESCRIPTION - DESCRIPTORS
DESCRIPTORS: ACHING
DESCRIPTORS: ACHING
DESCRIPTORS: ACHING;CRAMPING
DESCRIPTORS: ACHING;CRAMPING;CONSTANT
DESCRIPTORS: ACHING;CRAMPING;CONSTANT
DESCRIPTORS: ACHING;CRAMPING

## 2019-05-29 NOTE — PLAN OF CARE
Problem: Activity:  Goal: Risk for activity intolerance will decrease  Description  Risk for activity intolerance will decrease  5/29/2019 0300 by Jhoana Ochoa RN  Outcome: Ongoing  5/28/2019 1752 by David Brunson RN  Outcome: Ongoing     Problem:  Bowel/Gastric:  Goal: Bowel function will improve  Description  Bowel function will improve  5/29/2019 0300 by Jhoana Ochoa RN  Outcome: Ongoing  5/28/2019 1752 by David Brunson RN  Outcome: Ongoing  Goal: Diagnostic test results will improve  Description  Diagnostic test results will improve  5/29/2019 0300 by Jhoana Ochoa RN  Outcome: Ongoing  5/28/2019 1752 by David Brunson RN  Outcome: Ongoing  Goal: Occurrences of nausea will decrease  Description  Occurrences of nausea will decrease  5/29/2019 0300 by Jhoana Ochoa RN  Outcome: Ongoing  5/28/2019 1752 by David Brunson RN  Outcome: Ongoing  Goal: Occurrences of vomiting will decrease  Description  Occurrences of vomiting will decrease  5/29/2019 0300 by Jhoana Ochoa RN  Outcome: Ongoing  5/28/2019 1752 by David Brunson RN  Outcome: Ongoing     Problem: Fluid Volume:  Goal: Maintenance of adequate hydration will improve  Description  Maintenance of adequate hydration will improve  5/29/2019 0300 by Jhoana Ochoa RN  Outcome: Ongoing  5/28/2019 1752 by David Brunson RN  Outcome: Ongoing     Problem: Health Behavior:  Goal: Ability to state signs and symptoms to report to health care provider will improve  Description  Ability to state signs and symptoms to report to health care provider will improve  5/29/2019 0300 by Jhoana Ochoa RN  Outcome: Ongoing  5/28/2019 1752 by David Brunson RN  Outcome: Ongoing     Problem: Physical Regulation:  Goal: Complications related to the disease process, condition or treatment will be avoided or minimized  Description  Complications related to the disease process, condition or treatment will be avoided or minimized  5/29/2019 0300 by Geri Fernández RN  Outcome: Ongoing  5/28/2019 1752 by Gertrude Elizalde RN  Outcome: Ongoing  Goal: Ability to maintain clinical measurements within normal limits will improve  Description  Ability to maintain clinical measurements within normal limits will improve  5/29/2019 0300 by Geri Fernández RN  Outcome: Ongoing  5/28/2019 1752 by Gertrude Elizalde RN  Outcome: Ongoing     Problem: Sensory:  Goal: Ability to identify factors that increase the pain will improve  Description  Ability to identify factors that increase the pain will improve  5/29/2019 0300 by Geri Fernández RN  Outcome: Ongoing  5/28/2019 1752 by Gertrude Elizalde RN  Outcome: Ongoing  Goal: Ability to notify healthcare provider of pain before it becomes unmanageable or unbearable will improve  Description  Ability to notify healthcare provider of pain before it becomes unmanageable or unbearable will improve  5/29/2019 0300 by Geri Fernández RN  Outcome: Ongoing  5/28/2019 1752 by Gertrude Elizalde RN  Outcome: Ongoing  Goal: Pain level will decrease  Description  Pain level will decrease  5/29/2019 0300 by Geri Fernández RN  Outcome: Ongoing  5/28/2019 1752 by Gertrude Elizalde RN  Outcome: Ongoing     Problem: Pain:  Goal: Pain level will decrease  Description  Pain level will decrease  5/29/2019 0300 by Geri Fernández RN  Outcome: Ongoing  5/28/2019 1752 by Gertrude Elizalde RN  Outcome: Ongoing  Goal: Control of acute pain  Description  Control of acute pain  5/29/2019 0300 by Geri Fernández RN  Outcome: Ongoing  5/28/2019 1752 by Gertrude Elizalde RN  Outcome: Ongoing  Goal: Control of chronic pain  Description  Control of chronic pain  5/29/2019 0300 by Geri Fernández RN  Outcome: Ongoing  5/28/2019 1752 by Gertrude Elizalde RN  Outcome: Ongoing

## 2019-05-29 NOTE — PROGRESS NOTES
Patient seen and examined early this am.  Intermittent nausea  crampy abdominal pain  Afebrile    PE:  Vitals:    05/28/19 0616 05/28/19 1104 05/28/19 1745 05/28/19 2045   BP: (!) 91/54 (!) 93/51 102/63 120/64   Pulse: (!) 44 52 56 59   Resp:  16 16 16   Temp:  97.6 °F (36.4 °C) 98.8 °F (37.1 °C) 98.3 °F (36.8 °C)   TempSrc:  Oral Oral Oral   SpO2:  96%  99%   Weight:       Height:         Abd: tender LLQ, focal guarding and rebound    Labs reviewed    A/P:  -on zosyn  -continue conservative management  -check x-ray in am  -likely will repeat CT to evaluate for abscess

## 2019-05-29 NOTE — PLAN OF CARE
Problem: Activity:  Goal: Risk for activity intolerance will decrease  Description  Risk for activity intolerance will decrease  Outcome: Ongoing     Problem:  Bowel/Gastric:  Goal: Bowel function will improve  Description  Bowel function will improve  Outcome: Ongoing  Goal: Diagnostic test results will improve  Description  Diagnostic test results will improve  Outcome: Ongoing  Goal: Occurrences of nausea will decrease  Description  Occurrences of nausea will decrease  Outcome: Ongoing  Goal: Occurrences of vomiting will decrease  Description  Occurrences of vomiting will decrease  Outcome: Ongoing     Problem: Fluid Volume:  Goal: Maintenance of adequate hydration will improve  Description  Maintenance of adequate hydration will improve  Outcome: Ongoing     Problem: Health Behavior:  Goal: Ability to state signs and symptoms to report to health care provider will improve  Description  Ability to state signs and symptoms to report to health care provider will improve  Outcome: Ongoing     Problem: Physical Regulation:  Goal: Complications related to the disease process, condition or treatment will be avoided or minimized  Description  Complications related to the disease process, condition or treatment will be avoided or minimized  Outcome: Ongoing  Goal: Ability to maintain clinical measurements within normal limits will improve  Description  Ability to maintain clinical measurements within normal limits will improve  Outcome: Ongoing     Problem: Sensory:  Goal: Ability to identify factors that increase the pain will improve  Description  Ability to identify factors that increase the pain will improve  Outcome: Ongoing  Goal: Ability to notify healthcare provider of pain before it becomes unmanageable or unbearable will improve  Description  Ability to notify healthcare provider of pain before it becomes unmanageable or unbearable will improve  Outcome: Ongoing  Goal: Pain level will

## 2019-05-30 ENCOUNTER — APPOINTMENT (OUTPATIENT)
Dept: GENERAL RADIOLOGY | Age: 44
DRG: 329 | End: 2019-05-30
Payer: COMMERCIAL

## 2019-05-30 LAB
HCT VFR BLD CALC: 32.4 % (ref 37–47)
HEMOGLOBIN: 10.2 GM/DL (ref 12.5–16)
MCH RBC QN AUTO: 32.1 PG (ref 27–31)
MCHC RBC AUTO-ENTMCNC: 31.5 % (ref 32–36)
MCV RBC AUTO: 101.9 FL (ref 78–100)
PDW BLD-RTO: 12 % (ref 11.7–14.9)
PLATELET # BLD: 148 K/CU MM (ref 140–440)
PMV BLD AUTO: 11.2 FL (ref 7.5–11.1)
RBC # BLD: 3.18 M/CU MM (ref 4.2–5.4)
WBC # BLD: 4.3 K/CU MM (ref 4–10.5)

## 2019-05-30 PROCEDURE — 2580000003 HC RX 258: Performed by: SURGERY

## 2019-05-30 PROCEDURE — 94761 N-INVAS EAR/PLS OXIMETRY MLT: CPT

## 2019-05-30 PROCEDURE — 85027 COMPLETE CBC AUTOMATED: CPT

## 2019-05-30 PROCEDURE — 36415 COLL VENOUS BLD VENIPUNCTURE: CPT

## 2019-05-30 PROCEDURE — 94664 DEMO&/EVAL PT USE INHALER: CPT

## 2019-05-30 PROCEDURE — 71045 X-RAY EXAM CHEST 1 VIEW: CPT

## 2019-05-30 PROCEDURE — 74018 RADEX ABDOMEN 1 VIEW: CPT

## 2019-05-30 PROCEDURE — 94150 VITAL CAPACITY TEST: CPT

## 2019-05-30 PROCEDURE — 94640 AIRWAY INHALATION TREATMENT: CPT

## 2019-05-30 PROCEDURE — C9113 INJ PANTOPRAZOLE SODIUM, VIA: HCPCS | Performed by: SURGERY

## 2019-05-30 PROCEDURE — 6370000000 HC RX 637 (ALT 250 FOR IP): Performed by: SURGERY

## 2019-05-30 PROCEDURE — 1200000000 HC SEMI PRIVATE

## 2019-05-30 PROCEDURE — 6360000002 HC RX W HCPCS: Performed by: SURGERY

## 2019-05-30 RX ORDER — ONDANSETRON 4 MG/1
4 TABLET, ORALLY DISINTEGRATING ORAL EVERY 4 HOURS PRN
Status: DISCONTINUED | OUTPATIENT
Start: 2019-05-30 | End: 2019-05-30

## 2019-05-30 RX ORDER — HYDROMORPHONE HCL 110MG/55ML
0.5 PATIENT CONTROLLED ANALGESIA SYRINGE INTRAVENOUS
Status: DISCONTINUED | OUTPATIENT
Start: 2019-05-30 | End: 2019-05-30

## 2019-05-30 RX ORDER — HYDROMORPHONE HCL 110MG/55ML
0.5 PATIENT CONTROLLED ANALGESIA SYRINGE INTRAVENOUS
Status: DISCONTINUED | OUTPATIENT
Start: 2019-05-30 | End: 2019-06-01

## 2019-05-30 RX ORDER — KETOROLAC TROMETHAMINE 30 MG/ML
30 INJECTION, SOLUTION INTRAMUSCULAR; INTRAVENOUS EVERY 6 HOURS
Status: DISCONTINUED | OUTPATIENT
Start: 2019-05-30 | End: 2019-06-01

## 2019-05-30 RX ORDER — HYDROCODONE BITARTRATE AND ACETAMINOPHEN 5; 325 MG/1; MG/1
1 TABLET ORAL EVERY 4 HOURS PRN
Status: DISCONTINUED | OUTPATIENT
Start: 2019-05-30 | End: 2019-05-31

## 2019-05-30 RX ORDER — KETOROLAC TROMETHAMINE 30 MG/ML
30 INJECTION, SOLUTION INTRAMUSCULAR; INTRAVENOUS EVERY 6 HOURS
Status: DISCONTINUED | OUTPATIENT
Start: 2019-05-30 | End: 2019-05-30

## 2019-05-30 RX ORDER — HYDROCODONE BITARTRATE AND ACETAMINOPHEN 5; 325 MG/1; MG/1
2 TABLET ORAL ONCE
Status: COMPLETED | OUTPATIENT
Start: 2019-05-30 | End: 2019-05-30

## 2019-05-30 RX ORDER — ONDANSETRON 2 MG/ML
4 INJECTION INTRAMUSCULAR; INTRAVENOUS EVERY 4 HOURS PRN
Status: DISCONTINUED | OUTPATIENT
Start: 2019-05-30 | End: 2019-06-08 | Stop reason: HOSPADM

## 2019-05-30 RX ORDER — AMOXICILLIN AND CLAVULANATE POTASSIUM 875; 125 MG/1; MG/1
1 TABLET, FILM COATED ORAL 2 TIMES DAILY
Qty: 20 TABLET | Refills: 0 | Status: SHIPPED | OUTPATIENT
Start: 2019-05-30 | End: 2019-06-09

## 2019-05-30 RX ORDER — METRONIDAZOLE 500 MG/1
500 TABLET ORAL 3 TIMES DAILY
Qty: 30 TABLET | Refills: 0 | Status: SHIPPED | OUTPATIENT
Start: 2019-05-30 | End: 2019-06-09

## 2019-05-30 RX ORDER — ONDANSETRON 4 MG/1
4 TABLET, FILM COATED ORAL EVERY 4 HOURS PRN
Qty: 30 TABLET | Refills: 1 | Status: SHIPPED | OUTPATIENT
Start: 2019-05-30 | End: 2019-06-20

## 2019-05-30 RX ADMIN — PIPERACILLIN SODIUM,TAZOBACTAM SODIUM 3.38 G: 3; .375 INJECTION, POWDER, FOR SOLUTION INTRAVENOUS at 01:57

## 2019-05-30 RX ADMIN — ONDANSETRON 4 MG: 2 INJECTION INTRAMUSCULAR; INTRAVENOUS at 05:11

## 2019-05-30 RX ADMIN — KETOROLAC TROMETHAMINE 30 MG: 30 INJECTION, SOLUTION INTRAMUSCULAR; INTRAVENOUS at 01:57

## 2019-05-30 RX ADMIN — PIPERACILLIN SODIUM,TAZOBACTAM SODIUM 3.38 G: 3; .375 INJECTION, POWDER, FOR SOLUTION INTRAVENOUS at 09:02

## 2019-05-30 RX ADMIN — HYDROMORPHONE HYDROCHLORIDE 0.5 MG: 2 INJECTION, SOLUTION INTRAMUSCULAR; INTRAVENOUS; SUBCUTANEOUS at 05:10

## 2019-05-30 RX ADMIN — KETOROLAC TROMETHAMINE 30 MG: 30 INJECTION, SOLUTION INTRAMUSCULAR; INTRAVENOUS at 09:02

## 2019-05-30 RX ADMIN — IPRATROPIUM BROMIDE AND ALBUTEROL SULFATE 1 AMPULE: .5; 3 SOLUTION RESPIRATORY (INHALATION) at 08:44

## 2019-05-30 RX ADMIN — DEXTROSE AND SODIUM CHLORIDE: 5; 450 INJECTION, SOLUTION INTRAVENOUS at 23:47

## 2019-05-30 RX ADMIN — DEXTROSE AND SODIUM CHLORIDE: 5; 450 INJECTION, SOLUTION INTRAVENOUS at 10:52

## 2019-05-30 RX ADMIN — PIPERACILLIN SODIUM,TAZOBACTAM SODIUM 3.38 G: 3; .375 INJECTION, POWDER, FOR SOLUTION INTRAVENOUS at 21:02

## 2019-05-30 RX ADMIN — HYDROMORPHONE HYDROCHLORIDE 0.5 MG: 2 INJECTION, SOLUTION INTRAMUSCULAR; INTRAVENOUS; SUBCUTANEOUS at 15:23

## 2019-05-30 RX ADMIN — SIMETHICONE 125 MG: 125 TABLET, CHEWABLE ORAL at 23:47

## 2019-05-30 RX ADMIN — PIPERACILLIN SODIUM,TAZOBACTAM SODIUM 3.38 G: 3; .375 INJECTION, POWDER, FOR SOLUTION INTRAVENOUS at 14:34

## 2019-05-30 RX ADMIN — ONDANSETRON 4 MG: 2 INJECTION INTRAMUSCULAR; INTRAVENOUS at 18:45

## 2019-05-30 RX ADMIN — HYDROMORPHONE HYDROCHLORIDE 0.5 MG: 2 INJECTION, SOLUTION INTRAMUSCULAR; INTRAVENOUS; SUBCUTANEOUS at 18:45

## 2019-05-30 RX ADMIN — METOCLOPRAMIDE 10 MG: 5 INJECTION, SOLUTION INTRAMUSCULAR; INTRAVENOUS at 10:52

## 2019-05-30 RX ADMIN — ONDANSETRON 4 MG: 4 TABLET, ORALLY DISINTEGRATING ORAL at 14:33

## 2019-05-30 RX ADMIN — HYDROMORPHONE HYDROCHLORIDE 0.5 MG: 2 INJECTION, SOLUTION INTRAMUSCULAR; INTRAVENOUS; SUBCUTANEOUS at 23:48

## 2019-05-30 RX ADMIN — HYDROCODONE BITARTRATE AND ACETAMINOPHEN 2 TABLET: 5; 325 TABLET ORAL at 21:47

## 2019-05-30 RX ADMIN — PANTOPRAZOLE SODIUM 40 MG: 40 INJECTION, POWDER, FOR SOLUTION INTRAVENOUS at 09:02

## 2019-05-30 RX ADMIN — KETOROLAC TROMETHAMINE 30 MG: 30 INJECTION, SOLUTION INTRAMUSCULAR; INTRAVENOUS at 21:47

## 2019-05-30 ASSESSMENT — PAIN DESCRIPTION - DESCRIPTORS: DESCRIPTORS: ACHING

## 2019-05-30 ASSESSMENT — PAIN SCALES - GENERAL
PAINLEVEL_OUTOF10: 10
PAINLEVEL_OUTOF10: 4
PAINLEVEL_OUTOF10: 10
PAINLEVEL_OUTOF10: 7
PAINLEVEL_OUTOF10: 9
PAINLEVEL_OUTOF10: 8
PAINLEVEL_OUTOF10: 7

## 2019-05-30 ASSESSMENT — PAIN DESCRIPTION - FREQUENCY: FREQUENCY: CONTINUOUS

## 2019-05-30 ASSESSMENT — PAIN DESCRIPTION - PAIN TYPE: TYPE: ACUTE PAIN

## 2019-05-30 ASSESSMENT — PAIN DESCRIPTION - ONSET: ONSET: ON-GOING

## 2019-05-30 ASSESSMENT — PAIN DESCRIPTION - ORIENTATION: ORIENTATION: LOWER

## 2019-05-30 ASSESSMENT — PAIN - FUNCTIONAL ASSESSMENT: PAIN_FUNCTIONAL_ASSESSMENT: PREVENTS OR INTERFERES SOME ACTIVE ACTIVITIES AND ADLS

## 2019-05-30 ASSESSMENT — PAIN DESCRIPTION - LOCATION: LOCATION: ABDOMEN

## 2019-05-30 NOTE — CONSULTS
Nutrition Education    Type and Reason for Visit: Consult, Patient Education    · Pt was given Heart Healthy Eating handout to review general healthful nutrition. Went over low fiber and high protein foods for diet at home. Demonstrated how to read a food label and MyPlate for all food groups. Pt was very attentive and asked a lot of questions. Interaction was good and pt seemed to be able to succeed with this diet at home. · Verbally reviewed following information with patient  · Written educational materials provided. · Contact name and number provided. · Refer to Patient Education activity for more details.     Electronically signed by Jackelin Boyd RD, AST[facility] on 5/30/19 at 12:09 PM    Contact Number: [id number]

## 2019-05-30 NOTE — PROGRESS NOTES
Notified patient had chills and abdominal pain earlier this pm. Her pain doubled her over. She did receive IV dilaudid and it did feel better. She did have a fever to 99. I did come and examine her. She states she had 8 loose stools and then the pain started and is crampy in nature.     PE:  Vitals:    05/30/19 0846 05/30/19 1151 05/30/19 1246 05/30/19 1737   BP:   111/69 (!) 109/59   Pulse:   56 89   Resp: 16  16 16   Temp:   98.1 °F (36.7 °C) 99.4 °F (37.4 °C)   TempSrc:   Oral Oral   SpO2:  96% 98% 97%   Weight:       Height:         Abd: mild distention, soft, diffuse lower abdominal tenderness, no rebound, +voluntary guarding    A/P:  -patient was feeling better after initial dose of dilaudid, resumed IV zofran and toradol  -notified patient pain returned and was severe again and still felt warm, ordered CXR and Ad x-ray, evaluate for possible re-perforation

## 2019-05-31 ENCOUNTER — APPOINTMENT (OUTPATIENT)
Dept: GENERAL RADIOLOGY | Age: 44
DRG: 329 | End: 2019-05-31
Payer: COMMERCIAL

## 2019-05-31 LAB
HCT VFR BLD CALC: 32.7 % (ref 37–47)
HEMOGLOBIN: 10.4 GM/DL (ref 12.5–16)
MCH RBC QN AUTO: 31.5 PG (ref 27–31)
MCHC RBC AUTO-ENTMCNC: 31.8 % (ref 32–36)
MCV RBC AUTO: 99.1 FL (ref 78–100)
PDW BLD-RTO: 12.2 % (ref 11.7–14.9)
PLATELET # BLD: 188 K/CU MM (ref 140–440)
PMV BLD AUTO: 10.6 FL (ref 7.5–11.1)
RBC # BLD: 3.3 M/CU MM (ref 4.2–5.4)
WBC # BLD: 9.5 K/CU MM (ref 4–10.5)

## 2019-05-31 PROCEDURE — 1200000000 HC SEMI PRIVATE

## 2019-05-31 PROCEDURE — 85027 COMPLETE CBC AUTOMATED: CPT

## 2019-05-31 PROCEDURE — 2580000003 HC RX 258: Performed by: SURGERY

## 2019-05-31 PROCEDURE — 71045 X-RAY EXAM CHEST 1 VIEW: CPT

## 2019-05-31 PROCEDURE — 94640 AIRWAY INHALATION TREATMENT: CPT

## 2019-05-31 PROCEDURE — 36415 COLL VENOUS BLD VENIPUNCTURE: CPT

## 2019-05-31 PROCEDURE — 88307 TISSUE EXAM BY PATHOLOGIST: CPT

## 2019-05-31 PROCEDURE — 6360000002 HC RX W HCPCS: Performed by: SURGERY

## 2019-05-31 PROCEDURE — C9113 INJ PANTOPRAZOLE SODIUM, VIA: HCPCS | Performed by: SURGERY

## 2019-05-31 PROCEDURE — 74018 RADEX ABDOMEN 1 VIEW: CPT

## 2019-05-31 PROCEDURE — 6370000000 HC RX 637 (ALT 250 FOR IP): Performed by: SURGERY

## 2019-05-31 PROCEDURE — 88304 TISSUE EXAM BY PATHOLOGIST: CPT

## 2019-05-31 RX ORDER — 0.9 % SODIUM CHLORIDE 0.9 %
250 INTRAVENOUS SOLUTION INTRAVENOUS ONCE
Status: COMPLETED | OUTPATIENT
Start: 2019-05-31 | End: 2019-05-31

## 2019-05-31 RX ORDER — SIMETHICONE 125 MG
125 TABLET,CHEWABLE ORAL 4 TIMES DAILY
Status: DISCONTINUED | OUTPATIENT
Start: 2019-05-31 | End: 2019-06-01

## 2019-05-31 RX ADMIN — SIMETHICONE 125 MG: 125 TABLET, CHEWABLE ORAL at 08:44

## 2019-05-31 RX ADMIN — KETOROLAC TROMETHAMINE 30 MG: 30 INJECTION, SOLUTION INTRAMUSCULAR; INTRAVENOUS at 22:06

## 2019-05-31 RX ADMIN — PIPERACILLIN SODIUM,TAZOBACTAM SODIUM 3.38 G: 3; .375 INJECTION, POWDER, FOR SOLUTION INTRAVENOUS at 14:08

## 2019-05-31 RX ADMIN — PIPERACILLIN SODIUM,TAZOBACTAM SODIUM 3.38 G: 3; .375 INJECTION, POWDER, FOR SOLUTION INTRAVENOUS at 08:40

## 2019-05-31 RX ADMIN — DEXTROSE AND SODIUM CHLORIDE: 5; 450 INJECTION, SOLUTION INTRAVENOUS at 16:06

## 2019-05-31 RX ADMIN — SODIUM CHLORIDE 250 ML: 9 INJECTION, SOLUTION INTRAVENOUS at 06:37

## 2019-05-31 RX ADMIN — KETOROLAC TROMETHAMINE 30 MG: 30 INJECTION, SOLUTION INTRAMUSCULAR; INTRAVENOUS at 04:16

## 2019-05-31 RX ADMIN — KETOROLAC TROMETHAMINE 30 MG: 30 INJECTION, SOLUTION INTRAMUSCULAR; INTRAVENOUS at 13:02

## 2019-05-31 RX ADMIN — HYDROMORPHONE HYDROCHLORIDE 0.5 MG: 2 INJECTION, SOLUTION INTRAMUSCULAR; INTRAVENOUS; SUBCUTANEOUS at 03:11

## 2019-05-31 RX ADMIN — PIPERACILLIN SODIUM,TAZOBACTAM SODIUM 3.38 G: 3; .375 INJECTION, POWDER, FOR SOLUTION INTRAVENOUS at 21:04

## 2019-05-31 RX ADMIN — IPRATROPIUM BROMIDE AND ALBUTEROL SULFATE 1 AMPULE: .5; 3 SOLUTION RESPIRATORY (INHALATION) at 21:32

## 2019-05-31 RX ADMIN — SIMETHICONE 125 MG: 125 TABLET, CHEWABLE ORAL at 17:30

## 2019-05-31 RX ADMIN — HYDROMORPHONE HYDROCHLORIDE 0.5 MG: 2 INJECTION, SOLUTION INTRAMUSCULAR; INTRAVENOUS; SUBCUTANEOUS at 23:45

## 2019-05-31 RX ADMIN — SIMETHICONE 125 MG: 125 TABLET, CHEWABLE ORAL at 13:33

## 2019-05-31 RX ADMIN — SIMETHICONE 125 MG: 125 TABLET, CHEWABLE ORAL at 21:05

## 2019-05-31 RX ADMIN — KETOROLAC TROMETHAMINE 30 MG: 30 INJECTION, SOLUTION INTRAMUSCULAR; INTRAVENOUS at 18:26

## 2019-05-31 RX ADMIN — PANTOPRAZOLE SODIUM 40 MG: 40 INJECTION, POWDER, FOR SOLUTION INTRAVENOUS at 13:04

## 2019-05-31 RX ADMIN — PIPERACILLIN SODIUM,TAZOBACTAM SODIUM 3.38 G: 3; .375 INJECTION, POWDER, FOR SOLUTION INTRAVENOUS at 02:11

## 2019-05-31 RX ADMIN — HYDROMORPHONE HYDROCHLORIDE 0.5 MG: 2 INJECTION, SOLUTION INTRAMUSCULAR; INTRAVENOUS; SUBCUTANEOUS at 18:31

## 2019-05-31 ASSESSMENT — PAIN DESCRIPTION - DESCRIPTORS
DESCRIPTORS: ACHING
DESCRIPTORS: TIGHTNESS
DESCRIPTORS: TIGHTNESS
DESCRIPTORS: ACHING

## 2019-05-31 ASSESSMENT — PAIN SCALES - GENERAL
PAINLEVEL_OUTOF10: 8
PAINLEVEL_OUTOF10: 7
PAINLEVEL_OUTOF10: 7
PAINLEVEL_OUTOF10: 5
PAINLEVEL_OUTOF10: 5
PAINLEVEL_OUTOF10: 0
PAINLEVEL_OUTOF10: 6
PAINLEVEL_OUTOF10: 4
PAINLEVEL_OUTOF10: 5
PAINLEVEL_OUTOF10: 0
PAINLEVEL_OUTOF10: 8

## 2019-05-31 ASSESSMENT — PAIN DESCRIPTION - PROGRESSION
CLINICAL_PROGRESSION: GRADUALLY WORSENING

## 2019-05-31 ASSESSMENT — PAIN DESCRIPTION - LOCATION
LOCATION: ABDOMEN

## 2019-05-31 ASSESSMENT — PAIN DESCRIPTION - PAIN TYPE
TYPE: ACUTE PAIN

## 2019-05-31 ASSESSMENT — PAIN DESCRIPTION - FREQUENCY
FREQUENCY: CONTINUOUS

## 2019-05-31 ASSESSMENT — PAIN DESCRIPTION - ORIENTATION
ORIENTATION: UPPER;MID
ORIENTATION: LOWER;MID
ORIENTATION: MID
ORIENTATION: UPPER;MID
ORIENTATION: OTHER (COMMENT)

## 2019-05-31 ASSESSMENT — PAIN DESCRIPTION - ONSET
ONSET: ON-GOING

## 2019-05-31 ASSESSMENT — PAIN - FUNCTIONAL ASSESSMENT
PAIN_FUNCTIONAL_ASSESSMENT: ACTIVITIES ARE NOT PREVENTED
PAIN_FUNCTIONAL_ASSESSMENT: ACTIVITIES ARE NOT PREVENTED

## 2019-05-31 NOTE — PLAN OF CARE
Problem: Activity:  Goal: Risk for activity intolerance will decrease  Description  Risk for activity intolerance will decrease  5/31/2019 1539 by Tobias Davis RN  Outcome: Ongoing  5/31/2019 0412 by Rain Shepherd RN  Outcome: Ongoing     Problem:  Bowel/Gastric:  Goal: Bowel function will improve  Description  Bowel function will improve  5/31/2019 1539 by Tobias Davis RN  Outcome: Ongoing  5/31/2019 0412 by Rain Shepherd RN  Outcome: Ongoing  Goal: Diagnostic test results will improve  Description  Diagnostic test results will improve  5/31/2019 1539 by Tobias Davis RN  Outcome: Ongoing  5/31/2019 0412 by Rain Shepherd RN  Outcome: Ongoing  Goal: Occurrences of nausea will decrease  Description  Occurrences of nausea will decrease  5/31/2019 1539 by Tobias Davis RN  Outcome: Ongoing  5/31/2019 0412 by aRin Shepherd RN  Outcome: Ongoing  Goal: Occurrences of vomiting will decrease  Description  Occurrences of vomiting will decrease  5/31/2019 1539 by Tobias Davis RN  Outcome: Ongoing  5/31/2019 0412 by Rain Shepherd RN  Outcome: Ongoing     Problem: Fluid Volume:  Goal: Maintenance of adequate hydration will improve  Description  Maintenance of adequate hydration will improve  5/31/2019 1539 by Tobias Davis RN  Outcome: Ongoing  5/31/2019 0412 by Rain Shepherd RN  Outcome: Ongoing     Problem: Health Behavior:  Goal: Ability to state signs and symptoms to report to health care provider will improve  Description  Ability to state signs and symptoms to report to health care provider will improve  5/31/2019 1539 by Tobias Davis RN  Outcome: Ongoing  5/31/2019 0412 by Rain Shepherd RN  Outcome: Ongoing     Problem: Physical Regulation:  Goal: Complications related to the disease process, condition or treatment will be avoided or minimized  Description  Complications related to the disease process, condition or treatment will be avoided or minimized  5/31/2019 1539 by Tobisa Davis RN  Outcome: Ongoing  5/31/2019 0412 by Raymond Pollock RN  Outcome: Ongoing  Goal: Ability to maintain clinical measurements within normal limits will improve  Description  Ability to maintain clinical measurements within normal limits will improve  5/31/2019 1539 by Tim Hand RN  Outcome: Ongoing  5/31/2019 0412 by Raymond Pollock RN  Outcome: Ongoing     Problem: Sensory:  Goal: Ability to identify factors that increase the pain will improve  Description  Ability to identify factors that increase the pain will improve  5/31/2019 1539 by Tim Hand RN  Outcome: Ongoing  5/31/2019 0412 by Raymond Pollock RN  Outcome: Ongoing  Goal: Ability to notify healthcare provider of pain before it becomes unmanageable or unbearable will improve  Description  Ability to notify healthcare provider of pain before it becomes unmanageable or unbearable will improve  5/31/2019 1539 by Tim Hand RN  Outcome: Ongoing  5/31/2019 0412 by Raymond Pollock RN  Outcome: Ongoing  Goal: Pain level will decrease  Description  Pain level will decrease  5/31/2019 1539 by Tim Hand RN  Outcome: Ongoing  5/31/2019 0412 by Raymond Pollock RN  Outcome: Ongoing     Problem: Pain:  Description  Pain management should include both nonpharmacologic and pharmacologic interventions.   Goal: Pain level will decrease  Description  Pain level will decrease  5/31/2019 1539 by Tim Hand RN  Outcome: Ongoing  5/31/2019 0412 by Raymond Pollock RN  Outcome: Ongoing  Goal: Control of acute pain  Description  Control of acute pain  5/31/2019 1539 by Tim Hand RN  Outcome: Ongoing  5/31/2019 0412 by Raymond Pollock RN  Outcome: Ongoing  Goal: Control of chronic pain  Description  Control of chronic pain  5/31/2019 1539 by Tim Hand RN  Outcome: Ongoing  5/31/2019 0412 by Raymond Pollock RN  Outcome: Ongoing

## 2019-06-01 ENCOUNTER — APPOINTMENT (OUTPATIENT)
Dept: GENERAL RADIOLOGY | Age: 44
DRG: 329 | End: 2019-06-01
Payer: COMMERCIAL

## 2019-06-01 ENCOUNTER — ANESTHESIA EVENT (OUTPATIENT)
Dept: OPERATING ROOM | Age: 44
DRG: 329 | End: 2019-06-01
Payer: COMMERCIAL

## 2019-06-01 ENCOUNTER — ANESTHESIA (OUTPATIENT)
Dept: OPERATING ROOM | Age: 44
DRG: 329 | End: 2019-06-01
Payer: COMMERCIAL

## 2019-06-01 ENCOUNTER — APPOINTMENT (OUTPATIENT)
Dept: CT IMAGING | Age: 44
DRG: 329 | End: 2019-06-01
Payer: COMMERCIAL

## 2019-06-01 VITALS
SYSTOLIC BLOOD PRESSURE: 95 MMHG | RESPIRATION RATE: 14 BRPM | DIASTOLIC BLOOD PRESSURE: 64 MMHG | TEMPERATURE: 100.4 F | OXYGEN SATURATION: 100 %

## 2019-06-01 LAB
ANION GAP SERPL CALCULATED.3IONS-SCNC: 10 MMOL/L (ref 4–16)
BASOPHILS ABSOLUTE: 0 K/CU MM
BASOPHILS RELATIVE PERCENT: 0.2 % (ref 0–1)
BUN BLDV-MCNC: 4 MG/DL (ref 6–23)
CALCIUM SERPL-MCNC: 8.2 MG/DL (ref 8.3–10.6)
CHLORIDE BLD-SCNC: 100 MMOL/L (ref 99–110)
CO2: 27 MMOL/L (ref 21–32)
CREAT SERPL-MCNC: 0.7 MG/DL (ref 0.6–1.1)
DIFFERENTIAL TYPE: ABNORMAL
EOSINOPHILS ABSOLUTE: 0.1 K/CU MM
EOSINOPHILS RELATIVE PERCENT: 0.4 % (ref 0–3)
GFR AFRICAN AMERICAN: >60 ML/MIN/1.73M2
GFR NON-AFRICAN AMERICAN: >60 ML/MIN/1.73M2
GLUCOSE BLD-MCNC: 122 MG/DL (ref 70–99)
HCT VFR BLD CALC: 32.9 % (ref 37–47)
HEMOGLOBIN: 10.7 GM/DL (ref 12.5–16)
IMMATURE NEUTROPHIL %: 0.8 % (ref 0–0.43)
LYMPHOCYTES ABSOLUTE: 0.5 K/CU MM
LYMPHOCYTES RELATIVE PERCENT: 4.6 % (ref 24–44)
MCH RBC QN AUTO: 31.8 PG (ref 27–31)
MCHC RBC AUTO-ENTMCNC: 32.5 % (ref 32–36)
MCV RBC AUTO: 97.6 FL (ref 78–100)
MONOCYTES ABSOLUTE: 0.7 K/CU MM
MONOCYTES RELATIVE PERCENT: 6 % (ref 0–4)
NUCLEATED RBC %: 0 %
PDW BLD-RTO: 12.3 % (ref 11.7–14.9)
PLATELET # BLD: 220 K/CU MM (ref 140–440)
PMV BLD AUTO: 10 FL (ref 7.5–11.1)
POTASSIUM SERPL-SCNC: 3.2 MMOL/L (ref 3.5–5.1)
RBC # BLD: 3.37 M/CU MM (ref 4.2–5.4)
SEGMENTED NEUTROPHILS ABSOLUTE COUNT: 10 K/CU MM
SEGMENTED NEUTROPHILS RELATIVE PERCENT: 88 % (ref 36–66)
SODIUM BLD-SCNC: 137 MMOL/L (ref 135–145)
TOTAL IMMATURE NEUTOROPHIL: 0.09 K/CU MM
TOTAL NUCLEATED RBC: 0 K/CU MM
WBC # BLD: 11.3 K/CU MM (ref 4–10.5)

## 2019-06-01 PROCEDURE — 2500000003 HC RX 250 WO HCPCS: Performed by: SURGERY

## 2019-06-01 PROCEDURE — 0DTJ0ZZ RESECTION OF APPENDIX, OPEN APPROACH: ICD-10-PCS | Performed by: SURGERY

## 2019-06-01 PROCEDURE — 7100000000 HC PACU RECOVERY - FIRST 15 MIN: Performed by: SURGERY

## 2019-06-01 PROCEDURE — 3700000001 HC ADD 15 MINUTES (ANESTHESIA): Performed by: SURGERY

## 2019-06-01 PROCEDURE — 85025 COMPLETE CBC W/AUTO DIFF WBC: CPT

## 2019-06-01 PROCEDURE — 6360000002 HC RX W HCPCS: Performed by: SURGERY

## 2019-06-01 PROCEDURE — 7100000001 HC PACU RECOVERY - ADDTL 15 MIN: Performed by: SURGERY

## 2019-06-01 PROCEDURE — 87205 SMEAR GRAM STAIN: CPT

## 2019-06-01 PROCEDURE — 2580000003 HC RX 258: Performed by: SURGERY

## 2019-06-01 PROCEDURE — 3600000014 HC SURGERY LEVEL 4 ADDTL 15MIN: Performed by: SURGERY

## 2019-06-01 PROCEDURE — C9113 INJ PANTOPRAZOLE SODIUM, VIA: HCPCS | Performed by: SURGERY

## 2019-06-01 PROCEDURE — 36415 COLL VENOUS BLD VENIPUNCTURE: CPT

## 2019-06-01 PROCEDURE — 2500000003 HC RX 250 WO HCPCS: Performed by: NURSE ANESTHETIST, CERTIFIED REGISTERED

## 2019-06-01 PROCEDURE — 6360000002 HC RX W HCPCS: Performed by: ANESTHESIOLOGY

## 2019-06-01 PROCEDURE — 2720000010 HC SURG SUPPLY STERILE: Performed by: SURGERY

## 2019-06-01 PROCEDURE — 71045 X-RAY EXAM CHEST 1 VIEW: CPT

## 2019-06-01 PROCEDURE — 74177 CT ABD & PELVIS W/CONTRAST: CPT

## 2019-06-01 PROCEDURE — 6360000004 HC RX CONTRAST MEDICATION: Performed by: SURGERY

## 2019-06-01 PROCEDURE — 80048 BASIC METABOLIC PNL TOTAL CA: CPT

## 2019-06-01 PROCEDURE — 87073 CULTURE BACTERIA ANAEROBIC: CPT

## 2019-06-01 PROCEDURE — 2580000003 HC RX 258: Performed by: NURSE ANESTHETIST, CERTIFIED REGISTERED

## 2019-06-01 PROCEDURE — 87071 CULTURE AEROBIC QUANT OTHER: CPT

## 2019-06-01 PROCEDURE — 74018 RADEX ABDOMEN 1 VIEW: CPT

## 2019-06-01 PROCEDURE — 0DBN0ZZ EXCISION OF SIGMOID COLON, OPEN APPROACH: ICD-10-PCS | Performed by: SURGERY

## 2019-06-01 PROCEDURE — 6360000002 HC RX W HCPCS: Performed by: NURSE ANESTHETIST, CERTIFIED REGISTERED

## 2019-06-01 PROCEDURE — 3600000004 HC SURGERY LEVEL 4 BASE: Performed by: SURGERY

## 2019-06-01 PROCEDURE — 6370000000 HC RX 637 (ALT 250 FOR IP): Performed by: SURGERY

## 2019-06-01 PROCEDURE — 3700000000 HC ANESTHESIA ATTENDED CARE: Performed by: SURGERY

## 2019-06-01 PROCEDURE — 1200000000 HC SEMI PRIVATE

## 2019-06-01 PROCEDURE — 2709999900 HC NON-CHARGEABLE SUPPLY: Performed by: SURGERY

## 2019-06-01 PROCEDURE — 0WJG4ZZ INSPECTION OF PERITONEAL CAVITY, PERCUTANEOUS ENDOSCOPIC APPROACH: ICD-10-PCS | Performed by: SURGERY

## 2019-06-01 PROCEDURE — C1751 CATH, INF, PER/CENT/MIDLINE: HCPCS | Performed by: SURGERY

## 2019-06-01 PROCEDURE — 0DBP0ZZ EXCISION OF RECTUM, OPEN APPROACH: ICD-10-PCS | Performed by: SURGERY

## 2019-06-01 PROCEDURE — 0D1N0Z4 BYPASS SIGMOID COLON TO CUTANEOUS, OPEN APPROACH: ICD-10-PCS | Performed by: SURGERY

## 2019-06-01 PROCEDURE — 0W9G0ZX DRAINAGE OF PERITONEAL CAVITY, OPEN APPROACH, DIAGNOSTIC: ICD-10-PCS | Performed by: SURGERY

## 2019-06-01 RX ORDER — PROPOFOL 10 MG/ML
INJECTION, EMULSION INTRAVENOUS PRN
Status: DISCONTINUED | OUTPATIENT
Start: 2019-06-01 | End: 2019-06-01 | Stop reason: SDUPTHER

## 2019-06-01 RX ORDER — PROMETHAZINE HYDROCHLORIDE 25 MG/ML
6.25 INJECTION, SOLUTION INTRAMUSCULAR; INTRAVENOUS
Status: DISCONTINUED | OUTPATIENT
Start: 2019-06-01 | End: 2019-06-01 | Stop reason: HOSPADM

## 2019-06-01 RX ORDER — BUPIVACAINE HYDROCHLORIDE 5 MG/ML
INJECTION, SOLUTION EPIDURAL; INTRACAUDAL
Status: COMPLETED | OUTPATIENT
Start: 2019-06-01 | End: 2019-06-01

## 2019-06-01 RX ORDER — SODIUM CHLORIDE, SODIUM LACTATE, POTASSIUM CHLORIDE, CALCIUM CHLORIDE 600; 310; 30; 20 MG/100ML; MG/100ML; MG/100ML; MG/100ML
INJECTION, SOLUTION INTRAVENOUS
Status: COMPLETED
Start: 2019-06-01 | End: 2019-06-01

## 2019-06-01 RX ORDER — HYDROMORPHONE HCL 110MG/55ML
PATIENT CONTROLLED ANALGESIA SYRINGE INTRAVENOUS PRN
Status: DISCONTINUED | OUTPATIENT
Start: 2019-06-01 | End: 2019-06-01 | Stop reason: SDUPTHER

## 2019-06-01 RX ORDER — MORPHINE SULFATE/0.9% NACL/PF 1 MG/ML
SYRINGE (ML) INJECTION CONTINUOUS
Status: DISCONTINUED | OUTPATIENT
Start: 2019-06-01 | End: 2019-06-07

## 2019-06-01 RX ORDER — FENTANYL CITRATE 50 UG/ML
INJECTION, SOLUTION INTRAMUSCULAR; INTRAVENOUS PRN
Status: DISCONTINUED | OUTPATIENT
Start: 2019-06-01 | End: 2019-06-01 | Stop reason: SDUPTHER

## 2019-06-01 RX ORDER — MIDAZOLAM HYDROCHLORIDE 1 MG/ML
INJECTION INTRAMUSCULAR; INTRAVENOUS PRN
Status: DISCONTINUED | OUTPATIENT
Start: 2019-06-01 | End: 2019-06-01 | Stop reason: SDUPTHER

## 2019-06-01 RX ORDER — MAGNESIUM HYDROXIDE 1200 MG/15ML
LIQUID ORAL CONTINUOUS PRN
Status: COMPLETED | OUTPATIENT
Start: 2019-06-01 | End: 2019-06-01

## 2019-06-01 RX ORDER — MEPERIDINE HYDROCHLORIDE 25 MG/ML
12.5 INJECTION INTRAMUSCULAR; INTRAVENOUS; SUBCUTANEOUS EVERY 5 MIN PRN
Status: DISCONTINUED | OUTPATIENT
Start: 2019-06-01 | End: 2019-06-01 | Stop reason: HOSPADM

## 2019-06-01 RX ORDER — 0.9 % SODIUM CHLORIDE 0.9 %
10 VIAL (ML) INJECTION
Status: COMPLETED | OUTPATIENT
Start: 2019-06-01 | End: 2019-06-01

## 2019-06-01 RX ORDER — ROCURONIUM BROMIDE 10 MG/ML
INJECTION, SOLUTION INTRAVENOUS PRN
Status: DISCONTINUED | OUTPATIENT
Start: 2019-06-01 | End: 2019-06-01 | Stop reason: SDUPTHER

## 2019-06-01 RX ORDER — ONDANSETRON 2 MG/ML
INJECTION INTRAMUSCULAR; INTRAVENOUS PRN
Status: DISCONTINUED | OUTPATIENT
Start: 2019-06-01 | End: 2019-06-01 | Stop reason: SDUPTHER

## 2019-06-01 RX ORDER — HYDRALAZINE HYDROCHLORIDE 20 MG/ML
5 INJECTION INTRAMUSCULAR; INTRAVENOUS EVERY 10 MIN PRN
Status: DISCONTINUED | OUTPATIENT
Start: 2019-06-01 | End: 2019-06-01 | Stop reason: HOSPADM

## 2019-06-01 RX ORDER — DIPHENHYDRAMINE HYDROCHLORIDE 50 MG/ML
12.5 INJECTION INTRAMUSCULAR; INTRAVENOUS
Status: DISCONTINUED | OUTPATIENT
Start: 2019-06-01 | End: 2019-06-01 | Stop reason: HOSPADM

## 2019-06-01 RX ORDER — LIDOCAINE HYDROCHLORIDE 20 MG/ML
INJECTION, SOLUTION EPIDURAL; INFILTRATION; INTRACAUDAL; PERINEURAL PRN
Status: DISCONTINUED | OUTPATIENT
Start: 2019-06-01 | End: 2019-06-01 | Stop reason: SDUPTHER

## 2019-06-01 RX ORDER — ACETAMINOPHEN 10 MG/ML
1000 INJECTION, SOLUTION INTRAVENOUS ONCE
Status: COMPLETED | OUTPATIENT
Start: 2019-06-01 | End: 2019-06-01

## 2019-06-01 RX ORDER — HYDROMORPHONE HCL 110MG/55ML
0.5 PATIENT CONTROLLED ANALGESIA SYRINGE INTRAVENOUS EVERY 5 MIN PRN
Status: DISCONTINUED | OUTPATIENT
Start: 2019-06-01 | End: 2019-06-01 | Stop reason: HOSPADM

## 2019-06-01 RX ORDER — SODIUM CHLORIDE, SODIUM LACTATE, POTASSIUM CHLORIDE, CALCIUM CHLORIDE 600; 310; 30; 20 MG/100ML; MG/100ML; MG/100ML; MG/100ML
INJECTION, SOLUTION INTRAVENOUS CONTINUOUS PRN
Status: DISCONTINUED | OUTPATIENT
Start: 2019-06-01 | End: 2019-06-01 | Stop reason: SDUPTHER

## 2019-06-01 RX ORDER — SODIUM CHLORIDE AND POTASSIUM CHLORIDE .9; .15 G/100ML; G/100ML
SOLUTION INTRAVENOUS CONTINUOUS
Status: DISPENSED | OUTPATIENT
Start: 2019-06-01 | End: 2019-06-02

## 2019-06-01 RX ORDER — LABETALOL HYDROCHLORIDE 5 MG/ML
5 INJECTION, SOLUTION INTRAVENOUS EVERY 10 MIN PRN
Status: DISCONTINUED | OUTPATIENT
Start: 2019-06-01 | End: 2019-06-01 | Stop reason: HOSPADM

## 2019-06-01 RX ORDER — NALOXONE HYDROCHLORIDE 0.4 MG/ML
0.4 INJECTION, SOLUTION INTRAMUSCULAR; INTRAVENOUS; SUBCUTANEOUS PRN
Status: DISCONTINUED | OUTPATIENT
Start: 2019-06-01 | End: 2019-06-07

## 2019-06-01 RX ORDER — DEXAMETHASONE SODIUM PHOSPHATE 4 MG/ML
INJECTION, SOLUTION INTRA-ARTICULAR; INTRALESIONAL; INTRAMUSCULAR; INTRAVENOUS; SOFT TISSUE PRN
Status: DISCONTINUED | OUTPATIENT
Start: 2019-06-01 | End: 2019-06-01 | Stop reason: SDUPTHER

## 2019-06-01 RX ORDER — FENTANYL CITRATE 50 UG/ML
50 INJECTION, SOLUTION INTRAMUSCULAR; INTRAVENOUS EVERY 5 MIN PRN
Status: DISCONTINUED | OUTPATIENT
Start: 2019-06-01 | End: 2019-06-01 | Stop reason: HOSPADM

## 2019-06-01 RX ADMIN — Medication 30 MG: at 19:59

## 2019-06-01 RX ADMIN — ONDANSETRON 4 MG: 2 INJECTION INTRAMUSCULAR; INTRAVENOUS at 16:41

## 2019-06-01 RX ADMIN — PANTOPRAZOLE SODIUM 40 MG: 40 INJECTION, POWDER, FOR SOLUTION INTRAVENOUS at 08:01

## 2019-06-01 RX ADMIN — POTASSIUM CHLORIDE AND SODIUM CHLORIDE: 900; 150 INJECTION, SOLUTION INTRAVENOUS at 21:26

## 2019-06-01 RX ADMIN — FENTANYL CITRATE 50 MCG: 50 INJECTION INTRAMUSCULAR; INTRAVENOUS at 17:58

## 2019-06-01 RX ADMIN — HYDROMORPHONE HYDROCHLORIDE 0.5 MG: 2 INJECTION INTRAMUSCULAR; INTRAVENOUS; SUBCUTANEOUS at 16:00

## 2019-06-01 RX ADMIN — SIMETHICONE 125 MG: 125 TABLET, CHEWABLE ORAL at 08:01

## 2019-06-01 RX ADMIN — MIDAZOLAM HYDROCHLORIDE 2 MG: 1 INJECTION, SOLUTION INTRAMUSCULAR; INTRAVENOUS at 15:03

## 2019-06-01 RX ADMIN — SODIUM CHLORIDE, PRESERVATIVE FREE 10 ML: 5 INJECTION INTRAVENOUS at 11:51

## 2019-06-01 RX ADMIN — ROCURONIUM BROMIDE 50 MG: 10 INJECTION INTRAVENOUS at 15:10

## 2019-06-01 RX ADMIN — PIPERACILLIN SODIUM,TAZOBACTAM SODIUM 3.38 G: 3; .375 INJECTION, POWDER, FOR SOLUTION INTRAVENOUS at 08:01

## 2019-06-01 RX ADMIN — SUGAMMADEX 150 MG: 100 INJECTION, SOLUTION INTRAVENOUS at 16:44

## 2019-06-01 RX ADMIN — ACETAMINOPHEN 1000 MG: 10 INJECTION, SOLUTION INTRAVENOUS at 15:15

## 2019-06-01 RX ADMIN — DEXTROSE AND SODIUM CHLORIDE: 5; 450 INJECTION, SOLUTION INTRAVENOUS at 02:24

## 2019-06-01 RX ADMIN — LIDOCAINE HYDROCHLORIDE 100 MG: 20 INJECTION, SOLUTION EPIDURAL; INFILTRATION; INTRACAUDAL; PERINEURAL at 15:10

## 2019-06-01 RX ADMIN — PROPOFOL 180 MG: 10 INJECTION, EMULSION INTRAVENOUS at 15:10

## 2019-06-01 RX ADMIN — ASCORBIC ACID, VITAMIN A PALMITATE, CHOLECALCIFEROL, THIAMINE HYDROCHLORIDE, RIBOFLAVIN-5 PHOSPHATE SODIUM, PYRIDOXINE HYDROCHLORIDE, NIACINAMIDE, DEXPANTHENOL, ALPHA-TOCOPHEROL ACETATE, VITAMIN K1, FOLIC ACID, BIOTIN, CYANOCOBALAMIN: 200; 3300; 200; 6; 3.6; 6; 40; 15; 10; 150; 600; 60; 5 INJECTION, SOLUTION INTRAVENOUS at 21:26

## 2019-06-01 RX ADMIN — KETOROLAC TROMETHAMINE 30 MG: 30 INJECTION, SOLUTION INTRAMUSCULAR; INTRAVENOUS at 03:51

## 2019-06-01 RX ADMIN — PIPERACILLIN SODIUM,TAZOBACTAM SODIUM 3.38 G: 3; .375 INJECTION, POWDER, FOR SOLUTION INTRAVENOUS at 21:27

## 2019-06-01 RX ADMIN — FENTANYL CITRATE 100 MCG: 50 INJECTION INTRAMUSCULAR; INTRAVENOUS at 15:07

## 2019-06-01 RX ADMIN — FENTANYL CITRATE 50 MCG: 50 INJECTION INTRAMUSCULAR; INTRAVENOUS at 17:31

## 2019-06-01 RX ADMIN — KETOROLAC TROMETHAMINE 30 MG: 30 INJECTION, SOLUTION INTRAMUSCULAR; INTRAVENOUS at 10:28

## 2019-06-01 RX ADMIN — PIPERACILLIN SODIUM,TAZOBACTAM SODIUM 3.38 G: 3; .375 INJECTION, POWDER, FOR SOLUTION INTRAVENOUS at 02:24

## 2019-06-01 RX ADMIN — PIPERACILLIN SODIUM,TAZOBACTAM SODIUM 3.38 G: 3; .375 INJECTION, POWDER, FOR SOLUTION INTRAVENOUS at 14:39

## 2019-06-01 RX ADMIN — SODIUM CHLORIDE, POTASSIUM CHLORIDE, SODIUM LACTATE AND CALCIUM CHLORIDE: 600; 310; 30; 20 INJECTION, SOLUTION INTRAVENOUS at 15:03

## 2019-06-01 RX ADMIN — IOPAMIDOL 75 ML: 755 INJECTION, SOLUTION INTRAVENOUS at 11:51

## 2019-06-01 RX ADMIN — HYDROMORPHONE HYDROCHLORIDE 1 MG: 2 INJECTION INTRAMUSCULAR; INTRAVENOUS; SUBCUTANEOUS at 16:56

## 2019-06-01 RX ADMIN — SODIUM CHLORIDE, POTASSIUM CHLORIDE, SODIUM LACTATE AND CALCIUM CHLORIDE: 600; 310; 30; 20 INJECTION, SOLUTION INTRAVENOUS at 16:56

## 2019-06-01 RX ADMIN — DEXAMETHASONE SODIUM PHOSPHATE 8 MG: 4 INJECTION, SOLUTION INTRAMUSCULAR; INTRAVENOUS at 15:15

## 2019-06-01 RX ADMIN — DEXTROSE AND SODIUM CHLORIDE: 5; 450 INJECTION, SOLUTION INTRAVENOUS at 14:39

## 2019-06-01 RX ADMIN — ROCURONIUM BROMIDE 20 MG: 10 INJECTION INTRAVENOUS at 16:00

## 2019-06-01 ASSESSMENT — PULMONARY FUNCTION TESTS
PIF_VALUE: 20
PIF_VALUE: 8
PIF_VALUE: 1
PIF_VALUE: 19
PIF_VALUE: 4
PIF_VALUE: 11
PIF_VALUE: 18
PIF_VALUE: 0
PIF_VALUE: 21
PIF_VALUE: 20
PIF_VALUE: 19
PIF_VALUE: 17
PIF_VALUE: 18
PIF_VALUE: 19
PIF_VALUE: 19
PIF_VALUE: 20
PIF_VALUE: 18
PIF_VALUE: 11
PIF_VALUE: 20
PIF_VALUE: 3
PIF_VALUE: 17
PIF_VALUE: 1
PIF_VALUE: 1
PIF_VALUE: 3
PIF_VALUE: 19
PIF_VALUE: 17
PIF_VALUE: 2
PIF_VALUE: 17
PIF_VALUE: 18
PIF_VALUE: 17
PIF_VALUE: 19
PIF_VALUE: 18
PIF_VALUE: 18
PIF_VALUE: 20
PIF_VALUE: 18
PIF_VALUE: 20
PIF_VALUE: 11
PIF_VALUE: 4
PIF_VALUE: 18
PIF_VALUE: 17
PIF_VALUE: 19
PIF_VALUE: 18
PIF_VALUE: 18
PIF_VALUE: 20
PIF_VALUE: 19
PIF_VALUE: 19
PIF_VALUE: 11
PIF_VALUE: 18
PIF_VALUE: 19
PIF_VALUE: 19
PIF_VALUE: 20
PIF_VALUE: 19
PIF_VALUE: 17
PIF_VALUE: 18
PIF_VALUE: 19
PIF_VALUE: 20
PIF_VALUE: 20
PIF_VALUE: 17
PIF_VALUE: 20
PIF_VALUE: 19
PIF_VALUE: 21
PIF_VALUE: 17
PIF_VALUE: 18
PIF_VALUE: 19
PIF_VALUE: 19
PIF_VALUE: 26
PIF_VALUE: 19
PIF_VALUE: 17
PIF_VALUE: 19
PIF_VALUE: 20
PIF_VALUE: 17
PIF_VALUE: 18
PIF_VALUE: 13
PIF_VALUE: 20
PIF_VALUE: 19
PIF_VALUE: 12
PIF_VALUE: 20
PIF_VALUE: 19
PIF_VALUE: 18
PIF_VALUE: 18
PIF_VALUE: 1
PIF_VALUE: 21
PIF_VALUE: 18
PIF_VALUE: 19
PIF_VALUE: 19
PIF_VALUE: 20
PIF_VALUE: 18
PIF_VALUE: 3
PIF_VALUE: 20
PIF_VALUE: 21
PIF_VALUE: 19
PIF_VALUE: 18
PIF_VALUE: 18
PIF_VALUE: 11
PIF_VALUE: 20
PIF_VALUE: 19
PIF_VALUE: 18
PIF_VALUE: 18
PIF_VALUE: 19
PIF_VALUE: 20
PIF_VALUE: 19
PIF_VALUE: 19
PIF_VALUE: 18
PIF_VALUE: 20
PIF_VALUE: 1
PIF_VALUE: 11
PIF_VALUE: 20
PIF_VALUE: 20
PIF_VALUE: 3
PIF_VALUE: 18
PIF_VALUE: 19
PIF_VALUE: 20
PIF_VALUE: 18
PIF_VALUE: 20
PIF_VALUE: 20
PIF_VALUE: 0
PIF_VALUE: 19
PIF_VALUE: 20
PIF_VALUE: 19
PIF_VALUE: 18
PIF_VALUE: 20

## 2019-06-01 ASSESSMENT — PAIN DESCRIPTION - ONSET
ONSET: ON-GOING
ONSET: PROGRESSIVE
ONSET: ON-GOING

## 2019-06-01 ASSESSMENT — PAIN SCALES - GENERAL
PAINLEVEL_OUTOF10: 0
PAINLEVEL_OUTOF10: 7
PAINLEVEL_OUTOF10: 4
PAINLEVEL_OUTOF10: 7
PAINLEVEL_OUTOF10: 7
PAINLEVEL_OUTOF10: 8
PAINLEVEL_OUTOF10: 0
PAINLEVEL_OUTOF10: 5
PAINLEVEL_OUTOF10: 0
PAINLEVEL_OUTOF10: 7
PAINLEVEL_OUTOF10: 8
PAINLEVEL_OUTOF10: 0

## 2019-06-01 ASSESSMENT — PAIN DESCRIPTION - PROGRESSION
CLINICAL_PROGRESSION: GRADUALLY WORSENING
CLINICAL_PROGRESSION: NOT CHANGED
CLINICAL_PROGRESSION: GRADUALLY WORSENING
CLINICAL_PROGRESSION: NOT CHANGED
CLINICAL_PROGRESSION: GRADUALLY WORSENING
CLINICAL_PROGRESSION: NOT CHANGED

## 2019-06-01 ASSESSMENT — PAIN DESCRIPTION - PAIN TYPE
TYPE: ACUTE PAIN
TYPE: SURGICAL PAIN
TYPE: ACUTE PAIN
TYPE: SURGICAL PAIN
TYPE: ACUTE PAIN

## 2019-06-01 ASSESSMENT — PAIN DESCRIPTION - DESCRIPTORS
DESCRIPTORS: ACHING
DESCRIPTORS: ACHING
DESCRIPTORS: SHARP
DESCRIPTORS: TIGHTNESS
DESCRIPTORS: TIGHTNESS

## 2019-06-01 ASSESSMENT — PAIN DESCRIPTION - FREQUENCY
FREQUENCY: CONTINUOUS

## 2019-06-01 ASSESSMENT — PAIN DESCRIPTION - LOCATION
LOCATION: ABDOMEN

## 2019-06-01 ASSESSMENT — PAIN DESCRIPTION - ORIENTATION
ORIENTATION: MID
ORIENTATION: OTHER (COMMENT)
ORIENTATION: MID
ORIENTATION: MID

## 2019-06-01 ASSESSMENT — PAIN - FUNCTIONAL ASSESSMENT
PAIN_FUNCTIONAL_ASSESSMENT: PREVENTS OR INTERFERES SOME ACTIVE ACTIVITIES AND ADLS
PAIN_FUNCTIONAL_ASSESSMENT: ACTIVITIES ARE NOT PREVENTED

## 2019-06-01 NOTE — ANESTHESIA PROCEDURE NOTES
Central Venous Line:    A central venous line was placed using ultrasound guidance, in the OR for the following indication(s): central venous access. 6/1/2019 3:24 PM6/1/2019 3:39 PM    Sterility preparation included the following: hand hygiene performed prior to procedure, maximum sterile barriers used and sterile technique used to drape from head to toe. The patient was placed in Trendelenburg position. The right internal jugular vein was prepped. The site was prepped with Chloraprep. A 7 Fr (size), 8 (length), triple lumen was placed. During the procedure, the following specific steps were taken: target vein identified, needle advanced into vein and blood aspirated and guidewire advanced into vein. Intravenous verification was obtained by ultrasound, venous blood return and x-ray. Post insertion care included: all ports aspirated, all ports flushed easily, guidewire removed intact, Biopatch applied, line sutured in place and dressing applied. During the procedure the patient experienced: patient tolerated procedure well with no complications.       Insertion site scrubbed per usage guidelines?: Yes  Skin prep agent dried for 3 minutes prior to procedure?:yes  Anesthesia type: general..No  Staffing  Anesthesiologist: Debi Saini DO  Resident/CRNA: BEBETO Ma - JACOB  Performed: Resident/CRNA   Preanesthetic Checklist  Completed: patient identified, site marked, surgical consent, pre-op evaluation, timeout performed, IV checked, risks and benefits discussed, monitors and equipment checked, anesthesia consent given, oxygen available and patient being monitored

## 2019-06-01 NOTE — PROGRESS NOTES
Patient has been having abdominal pressure and pain and has been bloated  She continues to walk    PE:  Vitals:    05/31/19 1536 05/31/19 1539 05/31/19 2334 06/01/19 0508   BP:  106/64 103/62 107/68   Pulse:  63 86 68   Resp:  16 16 17   Temp:  97.8 °F (36.6 °C) 98.5 °F (36.9 °C) 98 °F (36.7 °C)   TempSrc: Oral Oral Oral Oral   SpO2:  96% 98% 99%   Weight:       Height:         Abd: distended, tender, some rebound this am      A/P:  -cxr this am only prelim report available, it looks like patient with free air this am.   -Called and I spoke with the radiologist an she does agree there is free air.  -discussed with patient the findings and the options, we have decided to order a CT scan. I have discussed the likelihood of surgical intervention.

## 2019-06-01 NOTE — PLAN OF CARE
Problem: Activity:  Goal: Risk for activity intolerance will decrease  Description  Risk for activity intolerance will decrease  6/1/2019 0256 by Leonardo Oleary RN  Outcome: Ongoing  6/1/2019 0254 by Leonardo Oleary RN  Outcome: Ongoing  5/31/2019 1539 by Elizabeth Luevano RN  Outcome: Ongoing     Problem: Bowel/Gastric:  Goal: Bowel function will improve  Description  Bowel function will improve  6/1/2019 0256 by Leonardo Oleary RN  Outcome: Ongoing  6/1/2019 0254 by Leonardo Oleary RN  Outcome: Ongoing  5/31/2019 1539 by Elizabeth Luevano RN  Outcome: Ongoing     Problem: Bowel/Gastric:  Goal: Diagnostic test results will improve  Description  Diagnostic test results will improve  6/1/2019 0256 by Leonardo Oleary RN  Outcome: Ongoing  6/1/2019 0254 by Leonardo Oleary RN  Outcome: Ongoing  5/31/2019 1539 by Elizabeth Luevano RN  Outcome: Ongoing     Problem: Bowel/Gastric:  Goal: Occurrences of nausea will decrease  Description  Occurrences of nausea will decrease  6/1/2019 0256 by Leonardo Oleary RN  Outcome: Ongoing  6/1/2019 0254 by Leonardo Oleary RN  Outcome: Ongoing  5/31/2019 1539 by Elizabeth Luevano RN  Outcome: Ongoing     Problem:  Bowel/Gastric:  Goal: Occurrences of vomiting will decrease  Description  Occurrences of vomiting will decrease  6/1/2019 0256 by Leonardo Oleary RN  Outcome: Ongoing  6/1/2019 0254 by Leonardo Oleary RN  Outcome: Ongoing  5/31/2019 1539 by Elizabeth Luevano RN  Outcome: Ongoing     Problem: Fluid Volume:  Goal: Maintenance of adequate hydration will improve  Description  Maintenance of adequate hydration will improve  6/1/2019 0256 by Leonardo Oleary RN  Outcome: Ongoing  6/1/2019 0254 by Leonardo Oleary RN  Outcome: Ongoing  5/31/2019 1539 by Elizabeth Luevano RN  Outcome: Ongoing     Problem: Health Behavior:  Goal: Ability to state signs and symptoms to report to health care provider will improve  Description  Ability to state signs and symptoms to report to health care provider will improve  6/1/2019 0256 by Hannah Medina RN  Outcome: Ongoing  6/1/2019 0254 by Hannah Medina RN  Outcome: Ongoing  5/31/2019 1539 by Diann Russell RN  Outcome: Ongoing     Problem: Physical Regulation:  Goal: Complications related to the disease process, condition or treatment will be avoided or minimized  Description  Complications related to the disease process, condition or treatment will be avoided or minimized  6/1/2019 0256 by Hannah Medina RN  Outcome: Ongoing  6/1/2019 0254 by Hannah Medina RN  Outcome: Ongoing  5/31/2019 1539 by Diann Russell RN  Outcome: Ongoing     Problem: Physical Regulation:  Goal: Ability to maintain clinical measurements within normal limits will improve  Description  Ability to maintain clinical measurements within normal limits will improve  6/1/2019 0256 by Hannah Medina RN  Outcome: Ongoing  6/1/2019 0254 by Hannah Medina RN  Outcome: Ongoing  5/31/2019 1539 by Diann Russell RN  Outcome: Ongoing     Problem: Sensory:  Goal: Ability to identify factors that increase the pain will improve  Description  Ability to identify factors that increase the pain will improve  6/1/2019 0256 by Hannah Medina RN  Outcome: Ongoing  6/1/2019 0254 by Hannah Medina RN  Outcome: Ongoing  5/31/2019 1539 by Diann Russell RN  Outcome: Ongoing     Problem: Sensory:  Goal: Ability to notify healthcare provider of pain before it becomes unmanageable or unbearable will improve  Description  Ability to notify healthcare provider of pain before it becomes unmanageable or unbearable will improve  6/1/2019 0256 by Hannah Medina RN  Outcome: Ongoing  6/1/2019 0254 by Hannah Medina RN  Outcome: Ongoing  5/31/2019 1539 by Diann Russell RN  Outcome: Ongoing     Problem: Sensory:  Goal: Pain level will decrease  Description  Pain level will decrease  6/1/2019 0256 by Hannah Medina RN  Outcome: Ongoing  6/1/2019 0254 by Hannah Medina

## 2019-06-01 NOTE — PROGRESS NOTES
46- pt rec'd from the OR and placed on pacu monitor with alarms on. Report rec'd from Stacey JAMES and OR nurse. Pt arousing and following commands. Pt re-oriented to surroundings. resps even and unlabored. ABd soft and non-distended. Midline ABD dressing has small amount of sero-sang drainage. No drainage from colostomy noted. No facial grimacing noted. 0- Dr. Keely Burns at bedside and speaking with pt.  1750- repositioned in bed. 1823- pt voiced some relief in ABD pain. Pt transferred back to room 4015 via bed without incident. Family and receiving nurse at the bedside.

## 2019-06-01 NOTE — BRIEF OP NOTE
Brief Postoperative Note  ______________________________________________________________    Patient: Prabhakar Barrientos  YOB: 1975  MRN: [id number]  Date of Procedure: 6/1/2019    Pre-Op Diagnosis: acute abd    Post-Op Diagnosis: Same       Procedure(s):  DIAGNOSTIC LAPAROSCOPY, EXPLORATORY LAPAROTOMY, DANIEL'S PROCEDURE, INCIDENTAL APPENDECTOMY, SIGMOID COLON RESECTION, DRAINAGE OF ABDOMINAL/pelvic ABSCESS, central line right IJ by anesthesia    Anesthesia: General    Surgeon(s):  Sienna Pryor MD    Estimated Blood Loss (mL): 072 ml    Complications: None    Specimens:   ID Type Source Tests Collected by Time Destination   A : SIGMOID COLON STITCH DEJUAN PROXIMAL  Tissue Colon SURGICAL PATHOLOGY Sienna Pryor MD 6/1/2019 1555    B : APPENDIX Tissue Appendix SURGICAL PATHOLOGY Sienna Pryor MD 6/1/2019 1620          Drains:   NG/OG/NJ/NE Tube Nasogastric 18 fr Right nostril (Active)       Colostomy LUQ (Active)       Urethral Catheter Non-latex 16 fr (Active)       Findings: 1 liter malodorous ascites, sigmoid colon diverticulitis with phlegmon and appendicitis    Sienna Pryor MD  Date: 6/1/2019  Time: 5:00 PM     Dictated #[id number]

## 2019-06-01 NOTE — PLAN OF CARE
Problem: Activity:  Goal: Risk for activity intolerance will decrease  Description  Risk for activity intolerance will decrease  6/1/2019 1619 by Beverly Milton RN  Outcome: Ongoing  6/1/2019 0256 by Geovani Yao RN  Outcome: Ongoing  6/1/2019 0254 by Geovani Yao RN  Outcome: Ongoing     Problem:  Bowel/Gastric:  Goal: Bowel function will improve  Description  Bowel function will improve  6/1/2019 1619 by Beverly Milton RN  Outcome: Ongoing  6/1/2019 0256 by Geovani Yao RN  Outcome: Ongoing  6/1/2019 0254 by Geovani Yao RN  Outcome: Ongoing  Goal: Diagnostic test results will improve  Description  Diagnostic test results will improve  6/1/2019 1619 by Beverly Milton RN  Outcome: Ongoing  6/1/2019 0256 by Geovani Yao RN  Outcome: Ongoing  6/1/2019 0254 by Geovani Yao RN  Outcome: Ongoing  Goal: Occurrences of nausea will decrease  Description  Occurrences of nausea will decrease  6/1/2019 1619 by Beverly Milton RN  Outcome: Ongoing  6/1/2019 0256 by Geovani Yao RN  Outcome: Ongoing  6/1/2019 0254 by Geovani Yao RN  Outcome: Ongoing  Goal: Occurrences of vomiting will decrease  Description  Occurrences of vomiting will decrease  6/1/2019 1619 by Beverly Milton RN  Outcome: Ongoing  6/1/2019 0256 by Geovani Yao RN  Outcome: Ongoing  6/1/2019 0254 by Geovani Yao RN  Outcome: Ongoing     Problem: Fluid Volume:  Goal: Maintenance of adequate hydration will improve  Description  Maintenance of adequate hydration will improve  6/1/2019 1619 by Beverly Milton RN  Outcome: Ongoing  6/1/2019 0256 by Geovani Yao RN  Outcome: Ongoing  6/1/2019 0254 by Geovani Yao RN  Outcome: Ongoing     Problem: Health Behavior:  Goal: Ability to state signs and symptoms to report to health care provider will improve  Description  Ability to state signs and symptoms to report to health care provider will improve  6/1/2019 1619 by Beverly Milton RN  Outcome: Ongoing  6/1/2019 9946 by Ziyad Hutton RN  Outcome: Ongoing  6/1/2019 0254 by Ziyad Hutton RN  Outcome: Ongoing     Problem: Physical Regulation:  Goal: Complications related to the disease process, condition or treatment will be avoided or minimized  Description  Complications related to the disease process, condition or treatment will be avoided or minimized  6/1/2019 1619 by Gilbert Gomez RN  Outcome: Ongoing  6/1/2019 0256 by Ziyad Hutton RN  Outcome: Ongoing  6/1/2019 0254 by Ziyad Hutton RN  Outcome: Ongoing  Goal: Ability to maintain clinical measurements within normal limits will improve  Description  Ability to maintain clinical measurements within normal limits will improve  6/1/2019 1619 by Gilbert Gomez RN  Outcome: Ongoing  6/1/2019 0256 by Ziyad Hutton RN  Outcome: Ongoing  6/1/2019 0254 by Ziyad Hutton RN  Outcome: Ongoing     Problem: Sensory:  Goal: Ability to identify factors that increase the pain will improve  Description  Ability to identify factors that increase the pain will improve  6/1/2019 1619 by Gilbert Gomez RN  Outcome: Ongoing  6/1/2019 0256 by Ziyad Hutton RN  Outcome: Ongoing  6/1/2019 0254 by Ziyad Hutton RN  Outcome: Ongoing  Goal: Ability to notify healthcare provider of pain before it becomes unmanageable or unbearable will improve  Description  Ability to notify healthcare provider of pain before it becomes unmanageable or unbearable will improve  6/1/2019 1619 by Gilbert Gomez RN  Outcome: Ongoing  6/1/2019 0256 by Ziyad Hutton RN  Outcome: Ongoing  6/1/2019 0254 by Ziyad Hutton RN  Outcome: Ongoing  Goal: Pain level will decrease  Description  Pain level will decrease  6/1/2019 1619 by Gilbert Gomez RN  Outcome: Ongoing  6/1/2019 0256 by Ziyad Hutton RN  Outcome: Ongoing  6/1/2019 0254 by Ziyad Hutton RN  Outcome: Ongoing     Problem: Pain:  Description  Pain management should include both nonpharmacologic and pharmacologic interventions.   Goal: Pain level will decrease  Description  Pain level will decrease  6/1/2019 1619 by Patrica Peters RN  Outcome: Ongoing  6/1/2019 0256 by Bijal Alexandre RN  Outcome: Ongoing  6/1/2019 0254 by Bijal Alexandre RN  Outcome: Ongoing  Goal: Control of acute pain  Description  Control of acute pain  6/1/2019 1619 by Patrica Peters RN  Outcome: Ongoing  6/1/2019 0256 by Bijal Alexandre RN  Outcome: Ongoing  6/1/2019 0254 by Bijal Alexandre RN  Outcome: Ongoing  Goal: Control of chronic pain  Description  Control of chronic pain  Outcome: Ongoing

## 2019-06-01 NOTE — PROGRESS NOTES
CT films reviewed, spoke with Dr. Annie Falk from radiology. CT with increasing intra-peritoneal fluid/ascites, no loculated fluid collection, no pneumatosis, free intra-peritoneal free air, no bowel obstruction appreciated. Patient with crampy pain and pressure, had BM this am  Wbc count increased to 11,300 today    PE:  Vitals:    05/31/19 1536 05/31/19 1539 05/31/19 2334 06/01/19 0508   BP:  106/64 103/62 107/68   Pulse:  63 86 68   Resp:  16 16 17   Temp:  97.8 °F (36.6 °C) 98.5 °F (36.9 °C) 98 °F (36.7 °C)   TempSrc: Oral Oral Oral Oral   SpO2:  96% 98% 99%   Weight:       Height:         Abd: distended, quiet, tender diffusely, some rebound, no guarding    A/P:  -discussed with Dr. Annie Falk from radiology that the fluid collections would be difficult to drain percutaneously  -I discussed with the patient, the worsening fluid, the free air persisting and the lung effusions. I recommended surgical intervention. We discussed laparoscopy to wash out and evaluate the abdomen, potentially place a drain. If purulent material, stool or significant induration/perforation noted will convert to open do a Jackeline's. Addendum:  I have discussed with the patient and her  the risks and benefits of surgery including but not limited to risk of bleeding, risk of infection, risk of injury to any intra-abdominal organs or structures and the possibility of failure of treatment. I discussed with the patient that they may need future procedures. I explained what a colostomy was and that she would have a second surgery in 3-6 months to reverse the colostomy. I discussed the limitations and restrictions in the post-operative period. The patient's questions were answered and She is agreeable to proceeding with surgery. Consent form was signed and surgery will be scheduled in the near future.

## 2019-06-01 NOTE — ANESTHESIA PRE PROCEDURE
Department of Anesthesiology  Preprocedure Note       Name:  Clarissa Bae   Age:  38 y.o.  :  1975                                          MRN:  [id number]         Date:  2019      Surgeon: Liv Webb):  Chuck Kaye MD    Procedure: LAPAROSCOPY EXPLORATORY (N/A )    Medications prior to admission:   Prior to Admission medications    Medication Sig Start Date End Date Taking?  Authorizing Provider   amoxicillin-clavulanate (AUGMENTIN) 875-125 MG per tablet Take 1 tablet by mouth 2 times daily for 10 days 19 Yes Chuck Kaye MD   metroNIDAZOLE (FLAGYL) 500 MG tablet Take 1 tablet by mouth 3 times daily for 10 days 19 Yes Chuck Kaye MD   ondansetron [facility]) 4 MG tablet Take 1 tablet by mouth every 4 hours as needed for Nausea or Vomiting 19  Yes Chuck Kaye MD       Current medications:    Current Facility-Administered Medications   Medication Dose Route Frequency Provider Last Rate Last Dose    acetaminophen (OFIRMEV) infusion 1,000 mg  1,000 mg Intravenous Once [facility]) chewable tablet 125 mg  125 mg Oral 4x Daily Chuck Kaye MD   125 mg at 19 0801    piperacillin-tazobactam (ZOSYN) 3.375 g in dextrose 5 % 50 mL IVPB (mini-bag)  3.375 g Intravenous Q6H Chuck Kaye MD   Stopped at 19 0831    ondansetron (ZOFRAN) injection 4 mg  4 mg Intravenous Q4H PRN Chuck Kaye MD   4 mg at 19 1845    ketorolac (TORADOL) injection 30 mg  30 mg Intravenous Q6H Chuck Kaye MD   30 mg at 19 1028    HYDROmorphone (DILAUDID) injection 0.5 mg  0.5 mg Intravenous Q3H PRN Chuck Kaye MD   0.5 mg at 19 0125    ipratropium-albuterol (DUONEB) nebulizer solution 1 ampule  1 ampule Inhalation Q4H WA Chuck Kaye MD   1 ampule at 19 2132    dextrose 5 % and 0.45 % sodium chloride infusion   Intravenous Continuous Chuck Kaye  mL/hr at 06/01/19 0224      acetaminophen (TYLENOL) tablet 650 mg  650 mg Oral Q4H PRN Amanda Floyd MD        pantoprazole (PROTONIX) injection 40 mg  40 mg Intravenous Daily Amanda Floyd MD   40 mg at 06/01/19 0801    metoclopramide (REGLAN) injection 10 mg  10 mg Intravenous Q6H PRN Amanda Floyd MD   10 mg at 05/30/19 1052       Allergies: Allergies   Allergen Reactions    Tetracyclines & Related Rash       Problem List:    Patient Active Problem List   Diagnosis Code    Lung infiltrate on CT R91.8    Chest pain R07.9    Lung nodule seen on imaging study R91.1    Adnexal cyst N94.9    Acute esophagitis K20.9    Perforated diverticulum of large intestine K57.20       Past Medical History:        Diagnosis Date    Acute esophagitis 11/21/2017    Adnexal cyst 10/24/2017    Chest pain 09/25/2017    \"a couple of yrs ago had some chest pain- did echo and everything was fine\"    History of kidney stones     \"3 or 4 yrs ago- tx with Lithotripsy- had to do two in a row\"/10/2017\"Pet scan did show I have 2 small stones now\"    Lung infiltrate on CT 9/25/2017    Lung nodule seen on imaging study 10/24/2017    scheduled for lung bx 10/25/2017\"\"[address] had side flank pain- thought kidney stone- had CT scan done 9/2017-showed shadow on left lower lung- sent to Dr Mix - did CT of chest showed nodule in both lungs- multiple on left an one right side\"    Prolonged emergence from general anesthesia     \"do have trouble waking up after surgery\"       Past Surgical History:        Procedure Laterality Date    [facility]       Social History:    Social History     Tobacco Use    Smoking status: Never Smoker    Smokeless tobacco: Never Used   Substance Use Topics    Alcohol use:  No                                Counseling given: Not Answered      Vital Signs (Current):   Vitals:    05/31/19 1536 05/31/19 1539 05/31/19 2334 06/01/19 0508   BP:  106/64 103/62 107/68   Pulse:  63 86 68   Resp:  16 16 17   Temp:  36.6 °C (97.8 °F) 36.9 °C (98.5 °F) 36.7 °C (98 °F)   TempSrc: Oral Oral Oral Oral   SpO2:  96% 98% 99%   Weight:       Height:                                                  BP Readings from Last 3 Encounters:   06/01/19 107/68   05/06/19 102/70   11/21/17 110/70       NPO Status:                                                                                 BMI:   Wt Readings from Last 3 Encounters:   05/27/19 127 lb (57.6 kg)   05/06/19 136 lb (61.7 kg)   11/21/17 143 lb (64.9 kg)     Body mass index is 21.46 kg/m². CBC:   Lab Results   Component Value Date    WBC 11.3 06/01/2019    RBC 3.37 06/01/2019    HGB 10.7 06/01/2019    HCT 32.9 06/01/2019    MCV 97.6 06/01/2019    RDW 12.3 06/01/2019     06/01/2019       CMP:   Lab Results   Component Value Date     06/01/2019    K 3.2 06/01/2019     06/01/2019    CO2 27 06/01/2019    BUN 4 06/01/2019    CREATININE 0.7 06/01/2019    GFRAA >60 06/01/2019    LABGLOM >60 06/01/2019    GLUCOSE 122 06/01/2019    PROT 6.9 05/27/2019    CALCIUM 8.2 06/01/2019    BILITOT 1.2 05/27/2019    ALKPHOS 51 05/27/2019    AST 14 05/27/2019    ALT 11 05/27/2019       POC Tests: No results for input(s): POCGLU, POCNA, POCK, POCCL, POCBUN, POCHEMO, POCHCT in the last 72 hours. Coags:   Lab Results   Component Value Date    PROTIME 12.7 05/29/2019    INR 1.12 05/29/2019    APTT 32.3 05/29/2019       HCG (If Applicable):   Lab Results   Component Value Date    PREGTESTUR NEGATIVE 10/16/2013        ABGs: No results found for: PHART, PO2ART, NMR5TKG, ATI7QTG, BEART, S1SEKTZH     Type & Screen (If Applicable):  No results found for: LABABO, [address]    Anesthesia Evaluation  Patient summary reviewed and Nursing notes reviewed history of anesthetic complications (hx prolonged emergence from [place]):    Airway: Mallampati: I  TM distance: >3 FB   Neck ROM: full  Mouth opening: > = 3 FB Dental:    (+) caps

## 2019-06-02 LAB
ALBUMIN SERPL-MCNC: 2.6 GM/DL (ref 3.4–5)
ALP BLD-CCNC: 70 IU/L (ref 40–129)
ALT SERPL-CCNC: 11 U/L (ref 10–40)
ANION GAP SERPL CALCULATED.3IONS-SCNC: 10 MMOL/L (ref 4–16)
AST SERPL-CCNC: 8 IU/L (ref 15–37)
BILIRUB SERPL-MCNC: 0.3 MG/DL (ref 0–1)
BILIRUBIN DIRECT: 0.2 MG/DL (ref 0–0.3)
BILIRUBIN, INDIRECT: 0.1 MG/DL (ref 0–0.7)
BUN BLDV-MCNC: 7 MG/DL (ref 6–23)
CALCIUM SERPL-MCNC: 7.9 MG/DL (ref 8.3–10.6)
CHLORIDE BLD-SCNC: 100 MMOL/L (ref 99–110)
CO2: 25 MMOL/L (ref 21–32)
CREAT SERPL-MCNC: 0.5 MG/DL (ref 0.6–1.1)
CULTURE: NORMAL
GFR AFRICAN AMERICAN: >60 ML/MIN/1.73M2
GFR NON-AFRICAN AMERICAN: >60 ML/MIN/1.73M2
GLUCOSE BLD-MCNC: 139 MG/DL (ref 70–99)
GLUCOSE BLD-MCNC: 150 MG/DL (ref 70–99)
GLUCOSE BLD-MCNC: 153 MG/DL (ref 70–99)
GRAM SMEAR: NORMAL
HCT VFR BLD CALC: 33.3 % (ref 37–47)
HEMOGLOBIN: 10.7 GM/DL (ref 12.5–16)
Lab: NORMAL
MAGNESIUM: 2 MG/DL (ref 1.8–2.4)
MCH RBC QN AUTO: 31.7 PG (ref 27–31)
MCHC RBC AUTO-ENTMCNC: 32.1 % (ref 32–36)
MCV RBC AUTO: 98.5 FL (ref 78–100)
PDW BLD-RTO: 12.2 % (ref 11.7–14.9)
PHOSPHORUS: 3.2 MG/DL (ref 2.5–4.9)
PLATELET # BLD: 262 K/CU MM (ref 140–440)
PMV BLD AUTO: 10.3 FL (ref 7.5–11.1)
POTASSIUM SERPL-SCNC: 3.7 MMOL/L (ref 3.5–5.1)
PREALBUMIN: ABNORMAL MG/DL (ref 20–40)
RBC # BLD: 3.38 M/CU MM (ref 4.2–5.4)
SODIUM BLD-SCNC: 135 MMOL/L (ref 135–145)
SPECIMEN: NORMAL
TOTAL PROTEIN: 4.9 GM/DL (ref 6.4–8.2)
WBC # BLD: 12.8 K/CU MM (ref 4–10.5)

## 2019-06-02 PROCEDURE — 94640 AIRWAY INHALATION TREATMENT: CPT

## 2019-06-02 PROCEDURE — C9113 INJ PANTOPRAZOLE SODIUM, VIA: HCPCS | Performed by: SURGERY

## 2019-06-02 PROCEDURE — 80053 COMPREHEN METABOLIC PANEL: CPT

## 2019-06-02 PROCEDURE — 84100 ASSAY OF PHOSPHORUS: CPT

## 2019-06-02 PROCEDURE — 82962 GLUCOSE BLOOD TEST: CPT

## 2019-06-02 PROCEDURE — 2580000003 HC RX 258: Performed by: SURGERY

## 2019-06-02 PROCEDURE — 83735 ASSAY OF MAGNESIUM: CPT

## 2019-06-02 PROCEDURE — 6360000002 HC RX W HCPCS: Performed by: SURGERY

## 2019-06-02 PROCEDURE — 84134 ASSAY OF PREALBUMIN: CPT

## 2019-06-02 PROCEDURE — 82248 BILIRUBIN DIRECT: CPT

## 2019-06-02 PROCEDURE — 1200000000 HC SEMI PRIVATE

## 2019-06-02 PROCEDURE — 2500000003 HC RX 250 WO HCPCS: Performed by: SURGERY

## 2019-06-02 PROCEDURE — 94761 N-INVAS EAR/PLS OXIMETRY MLT: CPT

## 2019-06-02 PROCEDURE — 94150 VITAL CAPACITY TEST: CPT

## 2019-06-02 PROCEDURE — 2700000000 HC OXYGEN THERAPY PER DAY

## 2019-06-02 PROCEDURE — 85027 COMPLETE CBC AUTOMATED: CPT

## 2019-06-02 PROCEDURE — 6370000000 HC RX 637 (ALT 250 FOR IP): Performed by: SURGERY

## 2019-06-02 RX ORDER — SODIUM CHLORIDE 9 MG/ML
INJECTION, SOLUTION INTRAVENOUS CONTINUOUS
Status: DISCONTINUED | OUTPATIENT
Start: 2019-06-02 | End: 2019-06-07

## 2019-06-02 RX ADMIN — PIPERACILLIN SODIUM,TAZOBACTAM SODIUM 3.38 G: 3; .375 INJECTION, POWDER, FOR SOLUTION INTRAVENOUS at 14:37

## 2019-06-02 RX ADMIN — PANTOPRAZOLE SODIUM 40 MG: 40 INJECTION, POWDER, FOR SOLUTION INTRAVENOUS at 11:02

## 2019-06-02 RX ADMIN — IPRATROPIUM BROMIDE AND ALBUTEROL SULFATE 1 AMPULE: .5; 3 SOLUTION RESPIRATORY (INHALATION) at 11:55

## 2019-06-02 RX ADMIN — PIPERACILLIN SODIUM,TAZOBACTAM SODIUM 3.38 G: 3; .375 INJECTION, POWDER, FOR SOLUTION INTRAVENOUS at 21:03

## 2019-06-02 RX ADMIN — ENOXAPARIN SODIUM 40 MG: 40 INJECTION SUBCUTANEOUS at 11:02

## 2019-06-02 RX ADMIN — I.V. FAT EMULSION 500 ML: 20 EMULSION INTRAVENOUS at 18:00

## 2019-06-02 RX ADMIN — IPRATROPIUM BROMIDE AND ALBUTEROL SULFATE 1 AMPULE: .5; 3 SOLUTION RESPIRATORY (INHALATION) at 19:28

## 2019-06-02 RX ADMIN — Medication 30 MG: at 14:34

## 2019-06-02 RX ADMIN — PIPERACILLIN SODIUM,TAZOBACTAM SODIUM 3.38 G: 3; .375 INJECTION, POWDER, FOR SOLUTION INTRAVENOUS at 02:12

## 2019-06-02 RX ADMIN — CALCIUM GLUCONATE: 94 INJECTION, SOLUTION INTRAVENOUS at 18:00

## 2019-06-02 RX ADMIN — SODIUM CHLORIDE: 9 INJECTION, SOLUTION INTRAVENOUS at 18:06

## 2019-06-02 RX ADMIN — IPRATROPIUM BROMIDE AND ALBUTEROL SULFATE 1 AMPULE: .5; 3 SOLUTION RESPIRATORY (INHALATION) at 07:48

## 2019-06-02 RX ADMIN — PIPERACILLIN SODIUM,TAZOBACTAM SODIUM 3.38 G: 3; .375 INJECTION, POWDER, FOR SOLUTION INTRAVENOUS at 11:02

## 2019-06-02 ASSESSMENT — PAIN DESCRIPTION - PAIN TYPE: TYPE: SURGICAL PAIN

## 2019-06-02 ASSESSMENT — PAIN SCALES - GENERAL
PAINLEVEL_OUTOF10: 0
PAINLEVEL_OUTOF10: 7
PAINLEVEL_OUTOF10: 0

## 2019-06-02 ASSESSMENT — PAIN DESCRIPTION - ORIENTATION: ORIENTATION: RIGHT;MID

## 2019-06-02 ASSESSMENT — PAIN DESCRIPTION - FREQUENCY: FREQUENCY: CONTINUOUS

## 2019-06-02 ASSESSMENT — PAIN DESCRIPTION - PROGRESSION: CLINICAL_PROGRESSION: NOT CHANGED

## 2019-06-02 ASSESSMENT — PAIN DESCRIPTION - ONSET: ONSET: ON-GOING

## 2019-06-02 ASSESSMENT — PAIN DESCRIPTION - LOCATION: LOCATION: ABDOMEN

## 2019-06-02 ASSESSMENT — PAIN DESCRIPTION - DESCRIPTORS: DESCRIPTORS: ACHING;SORE

## 2019-06-02 NOTE — PLAN OF CARE
Problem: Activity:  Goal: Risk for activity intolerance will decrease  Description  Risk for activity intolerance will decrease  Outcome: Ongoing     Problem:  Bowel/Gastric:  Goal: Bowel function will improve  Description  Bowel function will improve  Outcome: Ongoing  Goal: Diagnostic test results will improve  Description  Diagnostic test results will improve  Outcome: Ongoing  Goal: Occurrences of nausea will decrease  Description  Occurrences of nausea will decrease  Outcome: Ongoing  Goal: Occurrences of vomiting will decrease  Description  Occurrences of vomiting will decrease  Outcome: Ongoing     Problem: Fluid Volume:  Goal: Maintenance of adequate hydration will improve  Description  Maintenance of adequate hydration will improve  Outcome: Ongoing     Problem: Health Behavior:  Goal: Ability to state signs and symptoms to report to health care provider will improve  Description  Ability to state signs and symptoms to report to health care provider will improve  Outcome: Ongoing     Problem: Physical Regulation:  Goal: Complications related to the disease process, condition or treatment will be avoided or minimized  Description  Complications related to the disease process, condition or treatment will be avoided or minimized  Outcome: Ongoing  Goal: Ability to maintain clinical measurements within normal limits will improve  Description  Ability to maintain clinical measurements within normal limits will improve  Outcome: Ongoing     Problem: Sensory:  Goal: Ability to identify factors that increase the pain will improve  Description  Ability to identify factors that increase the pain will improve  Outcome: Ongoing  Goal: Ability to notify healthcare provider of pain before it becomes unmanageable or unbearable will improve  Description  Ability to notify healthcare provider of pain before it becomes unmanageable or unbearable will improve  Outcome: Ongoing  Goal: Pain level will decrease  Description  Pain level will decrease  Outcome: Ongoing     Problem: Pain:  Goal: Pain level will decrease  Description  Pain level will decrease  Outcome: Ongoing  Goal: Control of acute pain  Description  Control of acute pain  Outcome: Ongoing  Goal: Control of chronic pain  Description  Control of chronic pain  Outcome: Ongoing     Problem: Infection:  Goal: Will remain free from infection  Description  Will remain free from infection  Outcome: Ongoing     Problem: Safety:  Goal: Free from accidental physical injury  Description  Free from accidental physical injury  Outcome: Ongoing  Goal: Free from intentional harm  Description  Free from intentional harm  Outcome: Ongoing     Problem: Daily Care:  Goal: Daily care needs are met  Description  Daily care needs are met  Outcome: Ongoing     Problem: Discharge Planning:  Goal: Patients continuum of care needs are met  Description  Patients continuum of care needs are met  Outcome: Ongoing

## 2019-06-02 NOTE — OP NOTE
[address] [place] [address], [facility]                                OPERATIVE REPORT    PATIENT NAME: Rhetta Cranker                     :        1975  MED REC NO:   [id number]                          ROOM:       9835  ACCOUNT NO:   [de-identified]                           ADMIT DATE: 2019  PROVIDER:     Uday Ragsdale MD    DATE OF PROCEDURE:  2019    ATTENDING SURGEON:  Uday Ragsdale MD    PREPROCEDURE DIAGNOSIS:  Acute abdomen free air. POSTOPERATIVE DIAGNOSES:  Perforated diverticulitis with phlegmon,  intra-abdominal ascites and abscess, inflamed appendix. PROCEDURES PERFORMED:  1. Diagnostic laparoscopy. 2.  Exploratory laparotomy. 3.  Drainage of abdomino-pelvic abscess. 4.  Sigmoid colon resection. 5.  End colostomy/Jackeline's Procedure. 6.  Incidental appendectomy. ANESTHESIA:  General endotracheal anesthesia with 0.5% marcaine plain. DRAINS/LINES:  Included nasogastric tube, a Mayer catheter, right  internal jugular central line placed by anesthesia and five jas of  0.25 inch iodoform packing. ESTIMATED BLOOD LOSS:  100 mL. COMPLICATIONS:  None. PERTINENT HISTORY:  This is a 76-year-old female who presented on  2019 with abdominal pain less than 24 hours' time. She was  diagnosed with a perforated diverticulitis. She appeared to have a  microperforation with a few air bubbles, no abscess. She was started on  IV antibiotics and bowel rest. On hospital day #2 she underwent a  repeat CT scan which did show intra-abdominal ascites and loculated  Abscess. Percutaneous drain was attempted and it was identified that the  intra-abdominal fluid was not loculated and a drain could not be placed.   The patient was doing well and the next day began p.o. diet, on hospital  day #4 started a p.o. diet, was ambulating and in the afternoon started  having severe abdominal pain and abdominal bloating. X-rays did not  show any free air. Her white blood cell count was within normal limits. She did have a fever up to 99 at that time. Her pain was being  controlled with Toradol as well as Dilaudid as needed. Then hospital  day 5, she remained bloated, not nauseated, was ambulating in the chu  intermittently and pain was controlled with IV Dilaudid and Toradol. She did not show any improvement by hospital day #6 and x-rays were  repeated and she was found to have free air in her hemidiaphragm. CT  scan was obtained which did show large volume was intra-abdominal fluid. I discussed with her and her  the recommendation for surgical  intervention. We discussed the risks and benefits of surgery including  but not limited to risk of bleeding, risk of infection, risk of  scarring, risk of seroma fluid collection, risk of failure to risk of  the intra-abdominal organs or other structures. I specifically talked  about the plan to place a laparoscope to see if we could drain the fluid  or if there was a large amount of infection or feces we would proceed  with exploratory laparotomy and end colostomy. I explained what a  colostomy was and the plan for reversal in several months. She stated an  understanding and she was agreeable to proceeding with surgery. DESCRIPTION OF PROCEDURE:  The patient was seen in the preoperative  holding area. She was brought back to the operating suite and laid  supine on the operating table. She already had bilateral lower  extremity compression boots placed. General endotracheal anesthesia was  provided per anesthesiology. Mayer catheter was inserted, nasogastric  tube was inserted and a right internal jugular triple-lumen catheter was  inserted under ultrasound guidance by anesthesia. Her abdomen was then  prepped and draped in a sterile fashion. 0.5% Marcaine plain was  infiltrated in skin and subcutaneous tissue below the umbilicus.   A  small midline incision was made and a 5 mm optical trocar was introduced  into the abdomen without difficulty and the abdomen was insufflated with  CO2. On inspection into the pelvis there is a large volume of  malodorous green colored fluid free in the belly. At this time, I  decided to convert to open exploration. We did obtain cultures of the  fluid. I then made a low midline incision connecting the incision of  the trocar to this, incised into the fascia and incised the perineum  sharply. Upon entering the abdomen, we drained 1 liter of malodorous  green colored fluid and in the left lower quadrant the sigmoid colon was  markedly inflamed. There were dense adhesions of the small bowel to  this as I  this. The small bowel had a fibrinous rind. There  was a cavity between the small bowel and the colon that had a fibrinous  rind to it and consistent with an area of perforation on the colon. She  had several diverticulum in this area. The small bowel was inspected  and found to be intact. At this time, I did identify an area at the  proximal rectum and I transected this using a TREVON 75 stapler device. I  transected the mesentery to the colon and got an area of the proximal  sigmoid colon which did appear clinically to be free of diverticulum and  free of any thickening. This was transected using a TREVON stapling device  as well. I then copiously irrigated the abdomen with saline solution  with 2 liters of saline solution, siphoned the fluid free. I then  identified the left ureter which was intact and well away from the  dissection plane, mobilized the left colon along the white line of  Toldt, identified an area in the left mid abdomen where the colostomy  will be placed. I used a Kocher clamp on the skin and made a circular  incision, made an incision into the fascia on the subcutaneous tissue, a  cruciate incision into the fascia and subcutaneous tissue and into the  peritoneum.   I then delivered the end of the colon through. It was  tension-free. Next, I placed 3-0 Prolene sutures on the rectal stump  one on each end. I then began to run the small bowel. There was a  portion of distal small bowel that was away from the area of phlegmon  that was very irritated, it was viable and the appendix was in this area  and the appendix itself the distal tip was very thickened and inflamed. No fibrinous debris on this. It did appear consistent with early  appendicitis and so an appendectomy was performed transecting the base  of the appendix with a TREVON stapling device and transected the  mesoappendix with the LigaSure device. The rest of the small intestine  was inspected and the area of the phlegmonous rind was inspected and the bowel was  intact and patent. The nasogastric tube was then palpated within the  gastric lumen. Another 2 liters of warm saline solution were used to  irrigate the abdomen, the fluid was siphoned free and was now clear. At  this time, after receiving correct sponge, instrument and needle counts,  I did place the omentum which was very thin over the abdominal contents  and closed the midline with a #1 Prolene suture in a running stitch x2. I tacked the tail of the Prolene into the subcutaneous tissue with a  Vicryl suture. I then loosely reapproximated the skin edge with staples  and placed 0.25 inch iodoform packing between the staples for a total of  5 jas. Dry sterile dressing was placed. Next, the colostomy was  matured in a Brynn fashion using 4-0 Vicryl sutures circumferentially. The colostomy was pink and viable and an ostomy appliance was then  placed over the stoma. The patient was then extubated and transferred  to the post anesthesia care unit in stable condition with plans to  return to our hospital room. We will start TPN as she has been n.p.o.  for 6 days. She presented with a prealbumin of 10 on admission.   We  will continue the Zosyn antibiotics, IV hydration and bowel rest.        Darion Ferrara MD    D: 06/01/2019 17:55:52       T: 06/01/2019 18:07:02     [email or web address]  Job#: [id number]     Doc#: [id number]    CC:  Esteban Abraham MD

## 2019-06-03 LAB
ANION GAP SERPL CALCULATED.3IONS-SCNC: 8 MMOL/L (ref 4–16)
BUN BLDV-MCNC: 13 MG/DL (ref 6–23)
CALCIUM SERPL-MCNC: 7.6 MG/DL (ref 8.3–10.6)
CHLORIDE BLD-SCNC: 104 MMOL/L (ref 99–110)
CO2: 28 MMOL/L (ref 21–32)
CREAT SERPL-MCNC: 0.5 MG/DL (ref 0.6–1.1)
GFR AFRICAN AMERICAN: >60 ML/MIN/1.73M2
GFR NON-AFRICAN AMERICAN: >60 ML/MIN/1.73M2
GLUCOSE BLD-MCNC: 119 MG/DL (ref 70–99)
GLUCOSE BLD-MCNC: 166 MG/DL (ref 70–99)
GLUCOSE BLD-MCNC: 98 MG/DL (ref 70–99)
GLUCOSE BLD-MCNC: 98 MG/DL (ref 70–99)
HCT VFR BLD CALC: 30.8 % (ref 37–47)
HEMOGLOBIN: 9.6 GM/DL (ref 12.5–16)
MCH RBC QN AUTO: 30.9 PG (ref 27–31)
MCHC RBC AUTO-ENTMCNC: 31.2 % (ref 32–36)
MCV RBC AUTO: 99 FL (ref 78–100)
PDW BLD-RTO: 12.5 % (ref 11.7–14.9)
PHOSPHORUS: 2.8 MG/DL (ref 2.5–4.9)
PLATELET # BLD: 332 K/CU MM (ref 140–440)
PMV BLD AUTO: 9.9 FL (ref 7.5–11.1)
POTASSIUM SERPL-SCNC: 3.6 MMOL/L (ref 3.5–5.1)
RBC # BLD: 3.11 M/CU MM (ref 4.2–5.4)
SODIUM BLD-SCNC: 140 MMOL/L (ref 135–145)
WBC # BLD: 11.6 K/CU MM (ref 4–10.5)

## 2019-06-03 PROCEDURE — 2580000003 HC RX 258: Performed by: SURGERY

## 2019-06-03 PROCEDURE — 2500000003 HC RX 250 WO HCPCS: Performed by: SURGERY

## 2019-06-03 PROCEDURE — 1200000000 HC SEMI PRIVATE

## 2019-06-03 PROCEDURE — 82962 GLUCOSE BLOOD TEST: CPT

## 2019-06-03 PROCEDURE — 6370000000 HC RX 637 (ALT 250 FOR IP): Performed by: SURGERY

## 2019-06-03 PROCEDURE — 6360000002 HC RX W HCPCS

## 2019-06-03 PROCEDURE — 2700000000 HC OXYGEN THERAPY PER DAY

## 2019-06-03 PROCEDURE — 85027 COMPLETE CBC AUTOMATED: CPT

## 2019-06-03 PROCEDURE — 36415 COLL VENOUS BLD VENIPUNCTURE: CPT

## 2019-06-03 PROCEDURE — 80048 BASIC METABOLIC PNL TOTAL CA: CPT

## 2019-06-03 PROCEDURE — 6360000002 HC RX W HCPCS: Performed by: SURGERY

## 2019-06-03 PROCEDURE — C9113 INJ PANTOPRAZOLE SODIUM, VIA: HCPCS | Performed by: SURGERY

## 2019-06-03 PROCEDURE — 94640 AIRWAY INHALATION TREATMENT: CPT

## 2019-06-03 PROCEDURE — 94761 N-INVAS EAR/PLS OXIMETRY MLT: CPT

## 2019-06-03 PROCEDURE — 84100 ASSAY OF PHOSPHORUS: CPT

## 2019-06-03 RX ORDER — MORPHINE SULFATE 4 MG/ML
4 INJECTION, SOLUTION INTRAMUSCULAR; INTRAVENOUS ONCE
Status: COMPLETED | OUTPATIENT
Start: 2019-06-03 | End: 2019-06-03

## 2019-06-03 RX ORDER — MORPHINE SULFATE 4 MG/ML
2 INJECTION, SOLUTION INTRAMUSCULAR; INTRAVENOUS ONCE
Status: DISCONTINUED | OUTPATIENT
Start: 2019-06-03 | End: 2019-06-03

## 2019-06-03 RX ORDER — MORPHINE SULFATE 4 MG/ML
INJECTION, SOLUTION INTRAMUSCULAR; INTRAVENOUS
Status: COMPLETED
Start: 2019-06-03 | End: 2019-06-03

## 2019-06-03 RX ADMIN — POTASSIUM CHLORIDE: 2 INJECTION, SOLUTION, CONCENTRATE INTRAVENOUS at 18:26

## 2019-06-03 RX ADMIN — PANTOPRAZOLE SODIUM 40 MG: 40 INJECTION, POWDER, FOR SOLUTION INTRAVENOUS at 09:04

## 2019-06-03 RX ADMIN — PIPERACILLIN SODIUM,TAZOBACTAM SODIUM 3.38 G: 3; .375 INJECTION, POWDER, FOR SOLUTION INTRAVENOUS at 21:15

## 2019-06-03 RX ADMIN — ENOXAPARIN SODIUM 40 MG: 40 INJECTION SUBCUTANEOUS at 09:03

## 2019-06-03 RX ADMIN — PIPERACILLIN SODIUM,TAZOBACTAM SODIUM 3.38 G: 3; .375 INJECTION, POWDER, FOR SOLUTION INTRAVENOUS at 09:02

## 2019-06-03 RX ADMIN — IPRATROPIUM BROMIDE AND ALBUTEROL SULFATE 1 AMPULE: .5; 3 SOLUTION RESPIRATORY (INHALATION) at 21:55

## 2019-06-03 RX ADMIN — I.V. FAT EMULSION 500 ML: 20 EMULSION INTRAVENOUS at 18:26

## 2019-06-03 RX ADMIN — MORPHINE SULFATE 4 MG: 4 INJECTION INTRAVENOUS at 13:07

## 2019-06-03 RX ADMIN — PIPERACILLIN SODIUM,TAZOBACTAM SODIUM 3.38 G: 3; .375 INJECTION, POWDER, FOR SOLUTION INTRAVENOUS at 13:46

## 2019-06-03 RX ADMIN — Medication: at 12:04

## 2019-06-03 RX ADMIN — IPRATROPIUM BROMIDE AND ALBUTEROL SULFATE 1 AMPULE: .5; 3 SOLUTION RESPIRATORY (INHALATION) at 08:23

## 2019-06-03 RX ADMIN — MORPHINE SULFATE 4 MG: 4 INJECTION INTRAVENOUS at 12:18

## 2019-06-03 RX ADMIN — MORPHINE SULFATE 4 MG: 4 INJECTION, SOLUTION INTRAMUSCULAR; INTRAVENOUS at 13:07

## 2019-06-03 RX ADMIN — PIPERACILLIN SODIUM,TAZOBACTAM SODIUM 3.38 G: 3; .375 INJECTION, POWDER, FOR SOLUTION INTRAVENOUS at 02:23

## 2019-06-03 ASSESSMENT — PAIN DESCRIPTION - PAIN TYPE
TYPE: SURGICAL PAIN
TYPE: SURGICAL PAIN

## 2019-06-03 ASSESSMENT — PAIN SCALES - GENERAL
PAINLEVEL_OUTOF10: 0
PAINLEVEL_OUTOF10: 10
PAINLEVEL_OUTOF10: 0
PAINLEVEL_OUTOF10: 5
PAINLEVEL_OUTOF10: 10
PAINLEVEL_OUTOF10: 0
PAINLEVEL_OUTOF10: 0
PAINLEVEL_OUTOF10: 5
PAINLEVEL_OUTOF10: 10
PAINLEVEL_OUTOF10: 0

## 2019-06-03 ASSESSMENT — PAIN DESCRIPTION - FREQUENCY: FREQUENCY: CONTINUOUS

## 2019-06-03 ASSESSMENT — PAIN DESCRIPTION - LOCATION
LOCATION: ABDOMEN
LOCATION: ABDOMEN

## 2019-06-03 ASSESSMENT — PAIN DESCRIPTION - ORIENTATION: ORIENTATION: RIGHT;MID

## 2019-06-03 ASSESSMENT — PAIN DESCRIPTION - ONSET: ONSET: ON-GOING

## 2019-06-03 NOTE — PLAN OF CARE
Nutrition Problem: Inadequate oral intake  Intervention: Food and/or Nutrient Delivery: Continue NPO, Start Parenteral Nutrition  Nutritional Goals: Pt will recieve at least 75% of nutrition needs from TPN     Electronically signed by Gabby Gonzalez RD, LD on 6/3/2019 at 10:28 AM

## 2019-06-03 NOTE — PROGRESS NOTES
Patient had a good morning but her pca was out and she had increased pain. Now sleeping and more comfortable. PE;  Vitals:    06/03/19 0823 06/03/19 0929 06/03/19 0959 06/03/19 1108   BP:    104/63   Pulse:    68   Resp:    16   Temp:    97.7 °F (36.5 °C)   TempSrc:    Oral   SpO2: 96%   97%   Weight:   127 lb (57.6 kg)    Height:  5' 4.5\" (1.638 m) 5' 4.5\" (1.638 m)      Abd: tender at midline, packing removed and incision swabbed with betadine and dry bandage covered. Stoma pink and viable, some edema. A/P:  -POD#2  -vines out in am  -will continue continuous PCA today and plan to wean in am as pain better controlled.   -increase ambulation  -NG is bilious and watery  -bmp in am

## 2019-06-03 NOTE — PLAN OF CARE
Problem: Activity:  Goal: Risk for activity intolerance will decrease  Description  Risk for activity intolerance will decrease  Outcome: Ongoing     Problem:  Bowel/Gastric:  Goal: Bowel function will improve  Description  Bowel function will improve  Outcome: Ongoing  Goal: Diagnostic test results will improve  Description  Diagnostic test results will improve  Outcome: Ongoing  Goal: Occurrences of nausea will decrease  Description  Occurrences of nausea will decrease  Outcome: Ongoing  Goal: Occurrences of vomiting will decrease  Description  Occurrences of vomiting will decrease  Outcome: Ongoing     Problem: Fluid Volume:  Goal: Maintenance of adequate hydration will improve  Description  Maintenance of adequate hydration will improve  Outcome: Ongoing     Problem: Health Behavior:  Goal: Ability to state signs and symptoms to report to health care provider will improve  Description  Ability to state signs and symptoms to report to health care provider will improve  Outcome: Ongoing     Problem: Sensory:  Goal: Ability to identify factors that increase the pain will improve  Description  Ability to identify factors that increase the pain will improve  Outcome: Ongoing  Goal: Ability to notify healthcare provider of pain before it becomes unmanageable or unbearable will improve  Description  Ability to notify healthcare provider of pain before it becomes unmanageable or unbearable will improve  Outcome: Ongoing  Goal: Pain level will decrease  Description  Pain level will decrease  Outcome: Ongoing     Problem: Pain:  Goal: Pain level will decrease  Description  Pain level will decrease  Outcome: Ongoing  Goal: Control of acute pain  Description  Control of acute pain  Outcome: Ongoing  Goal: Control of chronic pain  Description  Control of chronic pain  Outcome: Ongoing     Problem: Pain:  Goal: Control of acute pain  Description  Control of acute pain  Outcome: Ongoing

## 2019-06-03 NOTE — CONSULTS
Nutrition Assessment    Type and Reason for Visit: Reassess, Consult    Nutrition Recommendations:   · TPN Order: 5/15 with lipids to a goal rate of 65 mL/hr to provide 1392.6 kcal and 78 gm protein. This regimen will provide 80% of estimated maximum kcal and 90% protein needs. Nutrition Assessment: High nutrition risk with NPO/Clears x 7 days (since admission). Pt admitted with severe ileus and diagnosed with perforated diverticulitis and intra-abdominal ascites and abcess. Pt with sigmoid colon resection and end colostomy as of 6/1. RD consulted for PN order/mgt. Malnutrition Assessment:  · Malnutrition Status: At risk for malnutrition  · Context: Acute illness or injury  · Findings of the 6 clinical characteristics of malnutrition (Minimum of 2 out of 6 clinical characteristics is required to make the diagnosis of moderate or severe Protein Calorie Malnutrition based on AND/ASPEN Guidelines):  1. Energy Intake-Less than or equal to 50% of estimated energy requirement, Greater than or equal to 7 days    2. Weight Loss-Unable to assess, unable to assess  3. Fat Loss-No significant subcutaneous fat loss, Orbital  4. Muscle Loss-Mild muscle mass loss, Temples (temporalis muscle)  5. Fluid Accumulation-No significant fluid accumulation, Extremities  6.   Strength-Not measured    Nutrition Risk Level: High    Nutrient Needs:  · Estimated Daily Total Kcal: 9279-1648 (25-30 kcal/kg current BW)  · Estimated Daily Protein (g): 70-87 (1.2-1.5 g/kg IBW)  · Estimated Daily Total Fluid (ml/day): 2400-0932 (1 mL/kcal)    Nutrition Diagnosis:   · Problem: Inadequate oral intake  · Etiology: related to Alteration in GI function     Signs and symptoms:  as evidenced by NPO status due to medical condition, Nutrition support - PN    Objective Information:  · Wound Type: Surgical Wound  · Current Nutrition Therapies:  · Oral Diet Orders: NPO   · Oral Diet intake: NPO  · Oral Nutrition Supplement (ONS) Orders: None  · ONS intake: NPO  · Anthropometric Measures:  · Ht: 5' 4.5\" (163.8 cm)   · Current Body Wt: 127 lb (57.6 kg)  · Admission Body Wt: 127 lb (57.6 kg)  · Ideal Body Wt: 120 lb (54.4 kg), % Ideal Body 105  · BMI Classification: BMI 18.5 - 24.9 Normal Weight    Nutrition Interventions:   Continue NPO, Start Parenteral Nutrition  Continued Inpatient Monitoring, Education Not Indicated, Coordination of Community Care    Nutrition Evaluation:   · Evaluation: Goals set   · Goals: Pt will recieve at least 75% of nutrition needs from TPN    · Monitoring: Nutrition Progression, PN Intake, PN Tolerance, Diarrhea, Nausea or Vomiting, Pertinent Labs, Weight, Monitor Bowel Function, Wound Healing      Electronically signed by Eliezer Wheatley RD, LD on 6/3/19 at 10:20 AM    Contact Number: [phone]

## 2019-06-04 LAB
ANION GAP SERPL CALCULATED.3IONS-SCNC: 9 MMOL/L (ref 4–16)
BUN BLDV-MCNC: 15 MG/DL (ref 6–23)
CALCIUM SERPL-MCNC: 7.8 MG/DL (ref 8.3–10.6)
CHLORIDE BLD-SCNC: 104 MMOL/L (ref 99–110)
CO2: 26 MMOL/L (ref 21–32)
CREAT SERPL-MCNC: 0.5 MG/DL (ref 0.6–1.1)
GFR AFRICAN AMERICAN: >60 ML/MIN/1.73M2
GFR NON-AFRICAN AMERICAN: >60 ML/MIN/1.73M2
GLUCOSE BLD-MCNC: 110 MG/DL (ref 70–99)
GLUCOSE BLD-MCNC: 114 MG/DL (ref 70–99)
GLUCOSE BLD-MCNC: 94 MG/DL (ref 70–99)
MAGNESIUM: 1.9 MG/DL (ref 1.8–2.4)
PHOSPHORUS: 3 MG/DL (ref 2.5–4.9)
POTASSIUM SERPL-SCNC: 3.8 MMOL/L (ref 3.5–5.1)
SODIUM BLD-SCNC: 139 MMOL/L (ref 135–145)

## 2019-06-04 PROCEDURE — 6370000000 HC RX 637 (ALT 250 FOR IP): Performed by: SURGERY

## 2019-06-04 PROCEDURE — 80048 BASIC METABOLIC PNL TOTAL CA: CPT

## 2019-06-04 PROCEDURE — 94761 N-INVAS EAR/PLS OXIMETRY MLT: CPT

## 2019-06-04 PROCEDURE — 2500000003 HC RX 250 WO HCPCS: Performed by: SURGERY

## 2019-06-04 PROCEDURE — 1200000000 HC SEMI PRIVATE

## 2019-06-04 PROCEDURE — 82962 GLUCOSE BLOOD TEST: CPT

## 2019-06-04 PROCEDURE — 2580000003 HC RX 258: Performed by: SURGERY

## 2019-06-04 PROCEDURE — 83735 ASSAY OF MAGNESIUM: CPT

## 2019-06-04 PROCEDURE — 94150 VITAL CAPACITY TEST: CPT

## 2019-06-04 PROCEDURE — 6360000002 HC RX W HCPCS: Performed by: SURGERY

## 2019-06-04 PROCEDURE — C9113 INJ PANTOPRAZOLE SODIUM, VIA: HCPCS | Performed by: SURGERY

## 2019-06-04 PROCEDURE — 84100 ASSAY OF PHOSPHORUS: CPT

## 2019-06-04 PROCEDURE — 94640 AIRWAY INHALATION TREATMENT: CPT

## 2019-06-04 RX ORDER — METOCLOPRAMIDE HYDROCHLORIDE 5 MG/ML
10 INJECTION INTRAMUSCULAR; INTRAVENOUS ONCE
Status: COMPLETED | OUTPATIENT
Start: 2019-06-04 | End: 2019-06-04

## 2019-06-04 RX ORDER — KETOROLAC TROMETHAMINE 30 MG/ML
30 INJECTION, SOLUTION INTRAMUSCULAR; INTRAVENOUS EVERY 6 HOURS
Status: DISCONTINUED | OUTPATIENT
Start: 2019-06-04 | End: 2019-06-07

## 2019-06-04 RX ADMIN — Medication: at 08:13

## 2019-06-04 RX ADMIN — SODIUM CHLORIDE: 9 INJECTION, SOLUTION INTRAVENOUS at 09:03

## 2019-06-04 RX ADMIN — IPRATROPIUM BROMIDE AND ALBUTEROL SULFATE 1 AMPULE: .5; 3 SOLUTION RESPIRATORY (INHALATION) at 08:55

## 2019-06-04 RX ADMIN — PANTOPRAZOLE SODIUM 40 MG: 40 INJECTION, POWDER, FOR SOLUTION INTRAVENOUS at 09:11

## 2019-06-04 RX ADMIN — ENOXAPARIN SODIUM 40 MG: 40 INJECTION SUBCUTANEOUS at 09:14

## 2019-06-04 RX ADMIN — METOCLOPRAMIDE 10 MG: 5 INJECTION, SOLUTION INTRAMUSCULAR; INTRAVENOUS at 09:07

## 2019-06-04 RX ADMIN — KETOROLAC TROMETHAMINE 30 MG: 30 INJECTION, SOLUTION INTRAMUSCULAR; INTRAVENOUS at 20:24

## 2019-06-04 RX ADMIN — KETOROLAC TROMETHAMINE 30 MG: 30 INJECTION, SOLUTION INTRAMUSCULAR; INTRAVENOUS at 09:06

## 2019-06-04 RX ADMIN — PIPERACILLIN SODIUM,TAZOBACTAM SODIUM 3.38 G: 3; .375 INJECTION, POWDER, FOR SOLUTION INTRAVENOUS at 20:24

## 2019-06-04 RX ADMIN — PIPERACILLIN SODIUM,TAZOBACTAM SODIUM 3.38 G: 3; .375 INJECTION, POWDER, FOR SOLUTION INTRAVENOUS at 01:54

## 2019-06-04 RX ADMIN — POTASSIUM CHLORIDE: 2 INJECTION, SOLUTION, CONCENTRATE INTRAVENOUS at 18:17

## 2019-06-04 RX ADMIN — PIPERACILLIN SODIUM,TAZOBACTAM SODIUM 3.38 G: 3; .375 INJECTION, POWDER, FOR SOLUTION INTRAVENOUS at 14:26

## 2019-06-04 RX ADMIN — IPRATROPIUM BROMIDE AND ALBUTEROL SULFATE 1 AMPULE: .5; 3 SOLUTION RESPIRATORY (INHALATION) at 12:38

## 2019-06-04 RX ADMIN — KETOROLAC TROMETHAMINE 30 MG: 30 INJECTION, SOLUTION INTRAMUSCULAR; INTRAVENOUS at 14:26

## 2019-06-04 RX ADMIN — IPRATROPIUM BROMIDE AND ALBUTEROL SULFATE 1 AMPULE: .5; 3 SOLUTION RESPIRATORY (INHALATION) at 19:37

## 2019-06-04 RX ADMIN — PIPERACILLIN SODIUM,TAZOBACTAM SODIUM 3.38 G: 3; .375 INJECTION, POWDER, FOR SOLUTION INTRAVENOUS at 09:05

## 2019-06-04 ASSESSMENT — PAIN SCALES - GENERAL
PAINLEVEL_OUTOF10: 7
PAINLEVEL_OUTOF10: 5
PAINLEVEL_OUTOF10: 5
PAINLEVEL_OUTOF10: 7
PAINLEVEL_OUTOF10: 5
PAINLEVEL_OUTOF10: 8
PAINLEVEL_OUTOF10: 5

## 2019-06-04 ASSESSMENT — PAIN DESCRIPTION - DESCRIPTORS
DESCRIPTORS: SHARP
DESCRIPTORS: ACHING;SHARP

## 2019-06-04 ASSESSMENT — PAIN DESCRIPTION - LOCATION
LOCATION: ABDOMEN
LOCATION: ABDOMEN

## 2019-06-04 ASSESSMENT — PAIN DESCRIPTION - ORIENTATION
ORIENTATION: MID
ORIENTATION: MID

## 2019-06-04 ASSESSMENT — PAIN DESCRIPTION - ONSET
ONSET: ON-GOING
ONSET: ON-GOING

## 2019-06-04 ASSESSMENT — PAIN DESCRIPTION - FREQUENCY
FREQUENCY: CONTINUOUS
FREQUENCY: CONTINUOUS

## 2019-06-04 ASSESSMENT — PAIN DESCRIPTION - PROGRESSION
CLINICAL_PROGRESSION: GRADUALLY WORSENING
CLINICAL_PROGRESSION: GRADUALLY WORSENING

## 2019-06-04 ASSESSMENT — PAIN DESCRIPTION - PAIN TYPE
TYPE: SURGICAL PAIN
TYPE: SURGICAL PAIN

## 2019-06-04 NOTE — PROGRESS NOTES
Patient seen and examined this am  Felt sore and \"not well\"  No complaint of chest pain   Sore on left side     PE:  Vitals:    06/04/19 0512 06/04/19 0856 06/04/19 0923 06/04/19 1021   BP: (!) 105/58  107/63 (!) 101/59   Pulse: 67  71 77   Resp: 16  16 13   Temp: 98.6 °F (37 °C)  97.2 °F (36.2 °C) 97.7 °F (36.5 °C)   TempSrc: Oral  Oral Oral   SpO2: 97% 97% 95% 94%   Weight: 176 lb 4.8 oz (80 kg)      Height:         Abd: soft, midline incision c/d/i, colostomy pink and viable, quiet    A/P:  -will re-assess later today  -plan to remove vines and stop continuous on PCA later today  -added toradol  -continue TPN, will continue with 84ml/hr, patient with severe protein malnutrition , I disagree with lower rate

## 2019-06-05 LAB
CULTURE: NORMAL
GLUCOSE BLD-MCNC: 100 MG/DL (ref 70–99)
GLUCOSE BLD-MCNC: 99 MG/DL (ref 70–99)
GRAM SMEAR: NORMAL
Lab: NORMAL
SPECIMEN: NORMAL

## 2019-06-05 PROCEDURE — 94150 VITAL CAPACITY TEST: CPT

## 2019-06-05 PROCEDURE — 6360000002 HC RX W HCPCS: Performed by: SURGERY

## 2019-06-05 PROCEDURE — 2580000003 HC RX 258: Performed by: SURGERY

## 2019-06-05 PROCEDURE — 82962 GLUCOSE BLOOD TEST: CPT

## 2019-06-05 PROCEDURE — C9113 INJ PANTOPRAZOLE SODIUM, VIA: HCPCS | Performed by: SURGERY

## 2019-06-05 PROCEDURE — 94640 AIRWAY INHALATION TREATMENT: CPT

## 2019-06-05 PROCEDURE — 99213 OFFICE O/P EST LOW 20 MIN: CPT

## 2019-06-05 PROCEDURE — 2500000003 HC RX 250 WO HCPCS: Performed by: SURGERY

## 2019-06-05 PROCEDURE — 1200000000 HC SEMI PRIVATE

## 2019-06-05 PROCEDURE — 6370000000 HC RX 637 (ALT 250 FOR IP): Performed by: SURGERY

## 2019-06-05 RX ORDER — METOCLOPRAMIDE HYDROCHLORIDE 5 MG/ML
10 INJECTION INTRAMUSCULAR; INTRAVENOUS ONCE
Status: COMPLETED | OUTPATIENT
Start: 2019-06-05 | End: 2019-06-05

## 2019-06-05 RX ADMIN — ONDANSETRON 4 MG: 2 INJECTION INTRAMUSCULAR; INTRAVENOUS at 17:22

## 2019-06-05 RX ADMIN — SODIUM CHLORIDE: 9 INJECTION, SOLUTION INTRAVENOUS at 17:22

## 2019-06-05 RX ADMIN — POTASSIUM CHLORIDE: 2 INJECTION, SOLUTION, CONCENTRATE INTRAVENOUS at 17:06

## 2019-06-05 RX ADMIN — IPRATROPIUM BROMIDE AND ALBUTEROL SULFATE 1 AMPULE: .5; 3 SOLUTION RESPIRATORY (INHALATION) at 20:44

## 2019-06-05 RX ADMIN — PIPERACILLIN SODIUM,TAZOBACTAM SODIUM 3.38 G: 3; .375 INJECTION, POWDER, FOR SOLUTION INTRAVENOUS at 02:21

## 2019-06-05 RX ADMIN — KETOROLAC TROMETHAMINE 30 MG: 30 INJECTION, SOLUTION INTRAMUSCULAR; INTRAVENOUS at 20:01

## 2019-06-05 RX ADMIN — PANTOPRAZOLE SODIUM 40 MG: 40 INJECTION, POWDER, FOR SOLUTION INTRAVENOUS at 10:19

## 2019-06-05 RX ADMIN — ENOXAPARIN SODIUM 40 MG: 40 INJECTION SUBCUTANEOUS at 10:20

## 2019-06-05 RX ADMIN — KETOROLAC TROMETHAMINE 30 MG: 30 INJECTION, SOLUTION INTRAMUSCULAR; INTRAVENOUS at 02:21

## 2019-06-05 RX ADMIN — PIPERACILLIN SODIUM,TAZOBACTAM SODIUM 3.38 G: 3; .375 INJECTION, POWDER, FOR SOLUTION INTRAVENOUS at 08:11

## 2019-06-05 RX ADMIN — PIPERACILLIN SODIUM,TAZOBACTAM SODIUM 3.38 G: 3; .375 INJECTION, POWDER, FOR SOLUTION INTRAVENOUS at 13:29

## 2019-06-05 RX ADMIN — IPRATROPIUM BROMIDE AND ALBUTEROL SULFATE 1 AMPULE: .5; 3 SOLUTION RESPIRATORY (INHALATION) at 15:11

## 2019-06-05 RX ADMIN — METOCLOPRAMIDE 10 MG: 5 INJECTION, SOLUTION INTRAMUSCULAR; INTRAVENOUS at 17:06

## 2019-06-05 RX ADMIN — KETOROLAC TROMETHAMINE 30 MG: 30 INJECTION, SOLUTION INTRAMUSCULAR; INTRAVENOUS at 13:28

## 2019-06-05 RX ADMIN — Medication 30 MG: at 09:21

## 2019-06-05 RX ADMIN — KETOROLAC TROMETHAMINE 30 MG: 30 INJECTION, SOLUTION INTRAMUSCULAR; INTRAVENOUS at 08:11

## 2019-06-05 RX ADMIN — PIPERACILLIN SODIUM,TAZOBACTAM SODIUM 3.38 G: 3; .375 INJECTION, POWDER, FOR SOLUTION INTRAVENOUS at 20:01

## 2019-06-05 RX ADMIN — IPRATROPIUM BROMIDE AND ALBUTEROL SULFATE 1 AMPULE: .5; 3 SOLUTION RESPIRATORY (INHALATION) at 07:28

## 2019-06-05 ASSESSMENT — PAIN SCALES - GENERAL
PAINLEVEL_OUTOF10: 6
PAINLEVEL_OUTOF10: 7
PAINLEVEL_OUTOF10: 7
PAINLEVEL_OUTOF10: 5
PAINLEVEL_OUTOF10: 5
PAINLEVEL_OUTOF10: 6
PAINLEVEL_OUTOF10: 7

## 2019-06-05 ASSESSMENT — PAIN DESCRIPTION - FREQUENCY: FREQUENCY: CONTINUOUS

## 2019-06-05 ASSESSMENT — PAIN DESCRIPTION - ORIENTATION: ORIENTATION: MID

## 2019-06-05 ASSESSMENT — PAIN DESCRIPTION - ONSET: ONSET: ON-GOING

## 2019-06-05 ASSESSMENT — PAIN DESCRIPTION - DESCRIPTORS: DESCRIPTORS: ACHING;SORE

## 2019-06-05 ASSESSMENT — PAIN DESCRIPTION - LOCATION: LOCATION: ABDOMEN

## 2019-06-05 ASSESSMENT — PAIN DESCRIPTION - PROGRESSION: CLINICAL_PROGRESSION: NOT CHANGED

## 2019-06-05 ASSESSMENT — PAIN DESCRIPTION - PAIN TYPE: TYPE: SURGICAL PAIN

## 2019-06-05 NOTE — CONSULTS
[address] Continence Nurse  Consult Note       Mayda Raygoza  AGE: 38 y.o.    GENDER: female  : 1975  TODAY'S DATE:  2019    Subjective:     Reason for [address] [place] Evaluation and Assessment: stoma consult and education      Mayda Raygoza is a 38 y.o. female referred by:   [x] Physician  [] Nursing  [] Other:     Wound Identification:  Wound Type: colostomy  Contributing Factors: none        PAST MEDICAL HISTORY        Diagnosis Date    Acute esophagitis 2017    Adnexal cyst 10/24/2017    Chest pain 2017    \"a couple of yrs ago had some chest pain- did echo and everything was fine\"    History of kidney stones     \"3 or 4 yrs ago- tx with Lithotripsy- had to do two in a row\"/10/2017\"Pet scan did show I have 2 small stones now\"    Lung infiltrate on CT 2017    Lung nodule seen on imaging study 10/24/2017    scheduled for lung bx 10/25/2017\"\"in [address] had side flank pain- thought kidney stone- had CT scan done 2017-showed shadow on left lower lung- sent to Dr Anai Salgado- did CT of chest showed nodule in both lungs- multiple on left an one right side\"    Prolonged emergence from general anesthesia     \"do have trouble waking up after surgery\"       PAST SURGICAL HISTORY    Past Surgical History:   Procedure Laterality Date    LITHOTRIPSY      PELVIC LAPAROSCOPY  1997    SMALL INTESTINE SURGERY N/A 2019    DIAGNOSTIC LAPAROSCOPY EXPLORATORY, EXPLORATORY LAPAROTOMY, DANIEL'S PROCEDURE, INCIDENTAL APPENDECTOMY, SIGMOID COLON RESECTION, DRAINAGE OF ABDOMINAL ABSCESS performed by Nile Hoffmann MD at [address] [place] [address]    Family History   Problem Relation Age of Onset    Stroke Father         TIA    High Blood Pressure Father        SOCIAL HISTORY    Social History     Tobacco Use    Smoking status: Never Smoker    Smokeless tobacco: Never Used   Substance Use Topics    Alcohol use: No    Drug use: No       ALLERGIES    Allergies   Allergen Reactions    Tetracyclines & Related Rash       MEDICATIONS    No current facility-administered medications on file prior to encounter. No current outpatient medications on file prior to encounter. Objective:      /68   Pulse 66   Temp 97.9 °F (36.6 °C) (Oral)   Resp 16   Ht 5' 4.5\" (1.638 m)   Wt 176 lb 4.8 oz (80 kg)   LMP 05/24/2019   SpO2 97%   BMI 29.79 kg/m²   Orlin Risk Score: Orlin Scale Score: 20    LABS    CBC:   Lab Results   Component Value Date    WBC 11.6 06/03/2019    RBC 3.11 06/03/2019    HGB 9.6 06/03/2019    HCT 30.8 06/03/2019    MCV 99.0 06/03/2019    MCH 30.9 06/03/2019    MCHC 31.2 06/03/2019    RDW 12.5 06/03/2019     06/03/2019    MPV 9.9 06/03/2019     CMP:    Lab Results   Component Value Date     06/04/2019    K 3.8 06/04/2019     06/04/2019    CO2 26 06/04/2019    BUN 15 06/04/2019    CREATININE 0.5 06/04/2019    GFRAA >60 06/04/2019    LABGLOM >60 06/04/2019    GLUCOSE 114 06/04/2019    PROT 4.9 06/02/2019    LABALBU 2.6 06/02/2019    CALCIUM 7.8 06/04/2019    BILITOT 0.3 06/02/2019    ALKPHOS 70 06/02/2019    AST 8 06/02/2019    ALT 11 06/02/2019     Albumin:    Lab Results   Component Value Date    LABALBU 2.6 06/02/2019     PT/INR:    Lab Results   Component Value Date    PROTIME 12.7 05/29/2019    INR 1.12 05/29/2019     HgBA1c:  No results found for: LABA1C      Assessment:     Patient Active Problem List   Diagnosis    Lung infiltrate on CT    Chest pain    Lung nodule seen on imaging study    Adnexal cyst    Acute esophagitis    Perforated diverticulum of large intestine       Measurements:       Response to treatment:  Well tolerated by patient. Pain Assessment:  Severity:  mild  Quality of pain: tender post-op abdomen  Wound Pain Timing/Severity: with any touching of abdomen  Premedicated: no    Plan:     Plan of Care:  Incision 06/01/19 Abdomen-Dressing/Treatment: Dry Dressing  Met with patient this AM to answer questions and ascertain what size supplies she needs, check integrity of stoma pouching system and review 2 colostomy teaching booklets provided. The barrier and pouch are intact with no leaking. She asked a few questions and is very willing and capable of learning how to do her stoma care. This afternoon returned to room and provided her with a booklet about healthy diet with a stoma, supplies to take home including barriers and pouches - 2 and 3/4\" - stoma powder, deodorant, odor eliminator spray and stoma rings. Reviewed each of the basic products and instructed/demonstrated how they work, and provided a sample barrier and pouch for her to practice with. Radha Frost signed the [facility] Kit form for her free sample kit. Faxed to [employer]. Plan to change the stoma pouching system Thursday or Friday this week so she can observe the process first-hand. Report given to Dr. Mignon Bentley. Specialty Bed Required : no, patient is ambulatory  [] Low Air Loss   [x] Pressure Redistribution  [] Fluid Immersion  [] Bariatric  [] Total Pressure Relief  [] Other:     Discharge Plan:  Placement for patient upon discharge: home with [address] [place] [address] Road: no  Patient appropriate for Outpatient [address] [facility] [address]: no    Patient/Caregiver Teaching:  Level of patient/caregiver understanding able to:   Pt voices understanding of instructions given for stoma care and is easily capable of becoming independent with stoma care.        Electronically signed by Rogers Haq RN, [address] [place] [address],[address] on 6/5/2019 at 4:33 PM

## 2019-06-05 NOTE — PROGRESS NOTES
Dressing to midline incision changed by Dr. Little Bruce. Patient ambulated 400 ft in hallway with family member then sat up in chair x 1 hr.

## 2019-06-05 NOTE — PLAN OF CARE
Problem: Activity:  Goal: Risk for activity intolerance will decrease  Description  Risk for activity intolerance will decrease  Outcome: Ongoing     Problem:  Bowel/Gastric:  Goal: Bowel function will improve  Description  Bowel function will improve  Outcome: Ongoing  Goal: Diagnostic test results will improve  Description  Diagnostic test results will improve  Outcome: Ongoing  Goal: Occurrences of nausea will decrease  Description  Occurrences of nausea will decrease  Outcome: Ongoing  Goal: Occurrences of vomiting will decrease  Description  Occurrences of vomiting will decrease  Outcome: Ongoing     Problem: Fluid Volume:  Goal: Maintenance of adequate hydration will improve  Description  Maintenance of adequate hydration will improve  Outcome: Ongoing     Problem: Health Behavior:  Goal: Ability to state signs and symptoms to report to health care provider will improve  Description  Ability to state signs and symptoms to report to health care provider will improve  Outcome: Ongoing     Problem: Physical Regulation:  Goal: Complications related to the disease process, condition or treatment will be avoided or minimized  Description  Complications related to the disease process, condition or treatment will be avoided or minimized  Outcome: Ongoing  Goal: Ability to maintain clinical measurements within normal limits will improve  Description  Ability to maintain clinical measurements within normal limits will improve  Outcome: Ongoing     Problem: Sensory:  Goal: Ability to identify factors that increase the pain will improve  Description  Ability to identify factors that increase the pain will improve  Outcome: Ongoing  Goal: Ability to notify healthcare provider of pain before it becomes unmanageable or unbearable will improve  Description  Ability to notify healthcare provider of pain before it becomes unmanageable or unbearable will improve  Outcome: Ongoing  Goal: Pain level will decrease  Description  Pain level will decrease  Outcome: Ongoing     Problem: Pain:  Goal: Pain level will decrease  Description  Pain level will decrease  Outcome: Ongoing  Goal: Control of acute pain  Description  Control of acute pain  Outcome: Ongoing  Goal: Control of chronic pain  Description  Control of chronic pain  Outcome: Ongoing     Problem: Infection:  Goal: Will remain free from infection  Description  Will remain free from infection  Outcome: Ongoing     Problem: Safety:  Goal: Free from accidental physical injury  Description  Free from accidental physical injury  Outcome: Ongoing  Goal: Free from intentional harm  Description  Free from intentional harm  Outcome: Ongoing     Problem: Daily Care:  Goal: Daily care needs are met  Description  Daily care needs are met  Outcome: Ongoing     Problem: Discharge Planning:  Goal: Patients continuum of care needs are met  Description  Patients continuum of care needs are met  Outcome: Ongoing     Problem: Nutrition  Goal: Optimal nutrition therapy  Outcome: Ongoing

## 2019-06-05 NOTE — PROGRESS NOTES
Patient continues to improve  Had a better night last night  Excellent UOP    PE:    Vitals:    06/05/19 1034 06/05/19 1339 06/05/19 1511 06/05/19 1632   BP: 122/69 136/68  (!) 151/65   Pulse: 63 66  60   Resp: 16 16 16 15   Temp: 98.2 °F (36.8 °C) 97.9 °F (36.6 °C)  97.6 °F (36.4 °C)   TempSrc: Oral Oral  Oral   SpO2: 98%  97% 96%   Weight:       Height:         Abd: soft, midline wound clean and well approximated, no redness, scant drainage on bandage, stoma pink and viable some edema, minimal flatus in appliance    A/P:  -POD #4 s/p Jackeline's  -continue iv abx  -continue TPN  -await return of gi function  -vines out yesterday, Ng out yesterday  -stopped continuous morphine on PCA  -enterostomal therapy consult today  -[facility] orders written   -continue to ambulate  -Dr. Jaleel Hall will cover for me starting this pm

## 2019-06-06 LAB
ANION GAP SERPL CALCULATED.3IONS-SCNC: 9 MMOL/L (ref 4–16)
BUN BLDV-MCNC: 14 MG/DL (ref 6–23)
CALCIUM SERPL-MCNC: 8.1 MG/DL (ref 8.3–10.6)
CHLORIDE BLD-SCNC: 107 MMOL/L (ref 99–110)
CO2: 26 MMOL/L (ref 21–32)
CREAT SERPL-MCNC: 0.6 MG/DL (ref 0.6–1.1)
GFR AFRICAN AMERICAN: >60 ML/MIN/1.73M2
GFR NON-AFRICAN AMERICAN: >60 ML/MIN/1.73M2
GLUCOSE BLD-MCNC: 107 MG/DL (ref 70–99)
HCT VFR BLD CALC: 29.4 % (ref 37–47)
HEMOGLOBIN: 9.2 GM/DL (ref 12.5–16)
MAGNESIUM: 1.9 MG/DL (ref 1.8–2.4)
MCH RBC QN AUTO: 31.3 PG (ref 27–31)
MCHC RBC AUTO-ENTMCNC: 31.3 % (ref 32–36)
MCV RBC AUTO: 100 FL (ref 78–100)
PDW BLD-RTO: 12.6 % (ref 11.7–14.9)
PHOSPHORUS: 4.2 MG/DL (ref 2.5–4.9)
PLATELET # BLD: 409 K/CU MM (ref 140–440)
PMV BLD AUTO: 9.1 FL (ref 7.5–11.1)
POTASSIUM SERPL-SCNC: 4.5 MMOL/L (ref 3.5–5.1)
RBC # BLD: 2.94 M/CU MM (ref 4.2–5.4)
SODIUM BLD-SCNC: 142 MMOL/L (ref 135–145)
WBC # BLD: 9.4 K/CU MM (ref 4–10.5)

## 2019-06-06 PROCEDURE — 2500000003 HC RX 250 WO HCPCS: Performed by: SURGERY

## 2019-06-06 PROCEDURE — 99211 OFF/OP EST MAY X REQ PHY/QHP: CPT

## 2019-06-06 PROCEDURE — 94761 N-INVAS EAR/PLS OXIMETRY MLT: CPT

## 2019-06-06 PROCEDURE — 94640 AIRWAY INHALATION TREATMENT: CPT

## 2019-06-06 PROCEDURE — 6360000002 HC RX W HCPCS: Performed by: SURGERY

## 2019-06-06 PROCEDURE — 80048 BASIC METABOLIC PNL TOTAL CA: CPT

## 2019-06-06 PROCEDURE — C9113 INJ PANTOPRAZOLE SODIUM, VIA: HCPCS | Performed by: SURGERY

## 2019-06-06 PROCEDURE — 84100 ASSAY OF PHOSPHORUS: CPT

## 2019-06-06 PROCEDURE — 85027 COMPLETE CBC AUTOMATED: CPT

## 2019-06-06 PROCEDURE — 2580000003 HC RX 258: Performed by: SURGERY

## 2019-06-06 PROCEDURE — 1200000000 HC SEMI PRIVATE

## 2019-06-06 PROCEDURE — 83735 ASSAY OF MAGNESIUM: CPT

## 2019-06-06 PROCEDURE — 6370000000 HC RX 637 (ALT 250 FOR IP): Performed by: SURGERY

## 2019-06-06 RX ADMIN — Medication: at 17:04

## 2019-06-06 RX ADMIN — PIPERACILLIN SODIUM,TAZOBACTAM SODIUM 3.38 G: 3; .375 INJECTION, POWDER, FOR SOLUTION INTRAVENOUS at 20:52

## 2019-06-06 RX ADMIN — PIPERACILLIN SODIUM,TAZOBACTAM SODIUM 3.38 G: 3; .375 INJECTION, POWDER, FOR SOLUTION INTRAVENOUS at 03:04

## 2019-06-06 RX ADMIN — PIPERACILLIN SODIUM,TAZOBACTAM SODIUM 3.38 G: 3; .375 INJECTION, POWDER, FOR SOLUTION INTRAVENOUS at 13:39

## 2019-06-06 RX ADMIN — CALCIUM GLUCONATE: 98 INJECTION, SOLUTION INTRAVENOUS at 17:09

## 2019-06-06 RX ADMIN — ENOXAPARIN SODIUM 40 MG: 40 INJECTION SUBCUTANEOUS at 09:21

## 2019-06-06 RX ADMIN — KETOROLAC TROMETHAMINE 30 MG: 30 INJECTION, SOLUTION INTRAMUSCULAR; INTRAVENOUS at 08:00

## 2019-06-06 RX ADMIN — IPRATROPIUM BROMIDE AND ALBUTEROL SULFATE 1 AMPULE: .5; 3 SOLUTION RESPIRATORY (INHALATION) at 07:40

## 2019-06-06 RX ADMIN — PIPERACILLIN SODIUM,TAZOBACTAM SODIUM 3.38 G: 3; .375 INJECTION, POWDER, FOR SOLUTION INTRAVENOUS at 08:01

## 2019-06-06 RX ADMIN — PANTOPRAZOLE SODIUM 40 MG: 40 INJECTION, POWDER, FOR SOLUTION INTRAVENOUS at 09:21

## 2019-06-06 RX ADMIN — KETOROLAC TROMETHAMINE 30 MG: 30 INJECTION, SOLUTION INTRAMUSCULAR; INTRAVENOUS at 03:04

## 2019-06-06 RX ADMIN — KETOROLAC TROMETHAMINE 30 MG: 30 INJECTION, SOLUTION INTRAMUSCULAR; INTRAVENOUS at 13:39

## 2019-06-06 ASSESSMENT — PAIN DESCRIPTION - FREQUENCY: FREQUENCY: CONTINUOUS

## 2019-06-06 ASSESSMENT — PAIN DESCRIPTION - ORIENTATION
ORIENTATION: MID
ORIENTATION: MID

## 2019-06-06 ASSESSMENT — PAIN SCALES - GENERAL
PAINLEVEL_OUTOF10: 5
PAINLEVEL_OUTOF10: 5
PAINLEVEL_OUTOF10: 6
PAINLEVEL_OUTOF10: 5

## 2019-06-06 ASSESSMENT — PAIN DESCRIPTION - LOCATION
LOCATION: ABDOMEN
LOCATION: ABDOMEN

## 2019-06-06 ASSESSMENT — PAIN DESCRIPTION - PAIN TYPE
TYPE: SURGICAL PAIN
TYPE: ACUTE PAIN;SURGICAL PAIN
TYPE: SURGICAL PAIN

## 2019-06-06 ASSESSMENT — PAIN DESCRIPTION - DESCRIPTORS
DESCRIPTORS: SORE
DESCRIPTORS: SORE

## 2019-06-06 ASSESSMENT — PAIN DESCRIPTION - ONSET: ONSET: ON-GOING

## 2019-06-06 ASSESSMENT — PAIN DESCRIPTION - PROGRESSION: CLINICAL_PROGRESSION: NOT CHANGED

## 2019-06-06 NOTE — PROGRESS NOTES
POD 5 Jackeline's procedure. AF VSS  Labs noted, H/H stable. No n/v/f/c  Colostomy is pink contents in appliance. Minimal amt of clrs taken. Ambulated in halls. Await for improved GI function.

## 2019-06-06 NOTE — CONSULTS
[address] Continence Nurse  Consult Note       Josué Tejeda  AGE: 38 y.o.    GENDER: female  : 1975  TODAY'S DATE:  2019    Subjective:     Reason for [address] [facility] Evaluation and Assessment: stoma consult      Josué Tejeda is a 38 y.o. female referred by:   [x] Physician  [] Nursing  [] Other:     Wound Identification:  Wound Type: colostomy  Contributing Factors: none        PAST MEDICAL HISTORY        Diagnosis Date    Acute esophagitis 2017    Adnexal cyst 10/24/2017    Chest pain 2017    \"a couple of yrs ago had some chest pain- did echo and everything was fine\"    History of kidney stones     \"3 or 4 yrs ago- tx with Lithotripsy- had to do two in a row\"/10/2017\"Pet scan did show I have 2 small stones now\"    Lung infiltrate on CT 2017    Lung nodule seen on imaging study 10/24/2017    scheduled for lung bx 10/25/2017\"\"[address] had side flank pain- thought kidney stone- had CT scan done 2017-showed shadow on left lower lung- sent to Dr Martinez Leal- did CT of chest showed nodule in both lungs- multiple on left an one right side\"    Prolonged emergence from general anesthesia     \"do have trouble waking up after surgery\"       PAST SURGICAL HISTORY    Past Surgical History:   Procedure Laterality Date    LITHOTRIPSY      PELVIC LAPAROSCOPY  1997    SMALL INTESTINE SURGERY N/A 2019    DIAGNOSTIC LAPAROSCOPY EXPLORATORY, EXPLORATORY LAPAROTOMY, DANIEL'S PROCEDURE, INCIDENTAL APPENDECTOMY, SIGMOID COLON RESECTION, DRAINAGE OF ABDOMINAL ABSCESS performed by Shahab Rdz MD at [address] [place] [address]    Family History   Problem Relation Age of Onset    Stroke Father         TIA    High Blood Pressure Father        SOCIAL HISTORY    Social History     Tobacco Use    Smoking status: Never Smoker    Smokeless tobacco: Never Used   Substance Use Topics    Alcohol use: No    Drug use: No       ALLERGIES    Allergies   Allergen Reactions    Tetracyclines & Related Rash       MEDICATIONS    No current facility-administered medications on file prior to encounter. No current outpatient medications on file prior to encounter. Objective:      BP (!) 108/58   Pulse 63   Temp 97.4 °F (36.3 °C) (Oral)   Resp 16   Ht 5' 4.5\" (1.638 m)   Wt 176 lb 4.8 oz (80 kg)   LMP 05/24/2019   SpO2 97%   BMI 29.79 kg/m²   Orlin Risk Score: Orlin Scale Score: 20    LABS    CBC:   Lab Results   Component Value Date    WBC 9.4 06/06/2019    RBC 2.94 06/06/2019    HGB 9.2 06/06/2019    HCT 29.4 06/06/2019    .0 06/06/2019    MCH 31.3 06/06/2019    MCHC 31.3 06/06/2019    RDW 12.6 06/06/2019     06/06/2019    MPV 9.1 06/06/2019     CMP:    Lab Results   Component Value Date     06/06/2019    K 4.5 06/06/2019     06/06/2019    CO2 26 06/06/2019    BUN 14 06/06/2019    CREATININE 0.6 06/06/2019    GFRAA >60 06/06/2019    LABGLOM >60 06/06/2019    GLUCOSE 107 06/06/2019    PROT 4.9 06/02/2019    LABALBU 2.6 06/02/2019    CALCIUM 8.1 06/06/2019    BILITOT 0.3 06/02/2019    ALKPHOS 70 06/02/2019    AST 8 06/02/2019    ALT 11 06/02/2019     Albumin:    Lab Results   Component Value Date    LABALBU 2.6 06/02/2019     PT/INR:    Lab Results   Component Value Date    PROTIME 12.7 05/29/2019    INR 1.12 05/29/2019     HgBA1c:  No results found for: LABA1C      Assessment:     Patient Active Problem List   Diagnosis    Lung infiltrate on CT    Chest pain    Lung nodule seen on imaging study    Adnexal cyst    Acute esophagitis    Perforated diverticulum of large intestine       Measurements:       Response to treatment:  Well tolerated by patient. Pain Assessment:  Severity:  denies  Quality of pain: na  Wound Pain Timing/Severity: na  Premedicated: no    Plan:     Plan of Care:  Incision 06/01/19 Abdomen-Dressing/Treatment: Dry Dressing  Visit to [facility] room for stoma assessment and noted pouching system is dry and intact, pouch has large amount

## 2019-06-06 NOTE — CARE COORDINATION
Discussed Home care with pt, she is agreeable. Gave her a PPO HC list to review, she will discuss with her doctor and let CM know which agency she would like to go with.   CM will revisit in am.

## 2019-06-07 PROCEDURE — 2580000003 HC RX 258: Performed by: SURGERY

## 2019-06-07 PROCEDURE — 2500000003 HC RX 250 WO HCPCS: Performed by: SURGERY

## 2019-06-07 PROCEDURE — C9113 INJ PANTOPRAZOLE SODIUM, VIA: HCPCS | Performed by: SURGERY

## 2019-06-07 PROCEDURE — 1200000000 HC SEMI PRIVATE

## 2019-06-07 PROCEDURE — 6360000002 HC RX W HCPCS: Performed by: SURGERY

## 2019-06-07 PROCEDURE — 99213 OFFICE O/P EST LOW 20 MIN: CPT

## 2019-06-07 PROCEDURE — 6370000000 HC RX 637 (ALT 250 FOR IP): Performed by: SURGERY

## 2019-06-07 RX ORDER — IPRATROPIUM BROMIDE AND ALBUTEROL SULFATE 2.5; .5 MG/3ML; MG/3ML
1 SOLUTION RESPIRATORY (INHALATION) EVERY 4 HOURS PRN
Status: DISCONTINUED | OUTPATIENT
Start: 2019-06-07 | End: 2019-06-08 | Stop reason: HOSPADM

## 2019-06-07 RX ORDER — OXYCODONE HYDROCHLORIDE AND ACETAMINOPHEN 5; 325 MG/1; MG/1
2 TABLET ORAL EVERY 6 HOURS PRN
Status: DISCONTINUED | OUTPATIENT
Start: 2019-06-07 | End: 2019-06-08 | Stop reason: HOSPADM

## 2019-06-07 RX ORDER — MORPHINE SULFATE 4 MG/ML
2 INJECTION, SOLUTION INTRAMUSCULAR; INTRAVENOUS
Status: DISCONTINUED | OUTPATIENT
Start: 2019-06-07 | End: 2019-06-08 | Stop reason: HOSPADM

## 2019-06-07 RX ADMIN — POTASSIUM CHLORIDE: 2 INJECTION, SOLUTION, CONCENTRATE INTRAVENOUS at 18:19

## 2019-06-07 RX ADMIN — PIPERACILLIN SODIUM,TAZOBACTAM SODIUM 3.38 G: 3; .375 INJECTION, POWDER, FOR SOLUTION INTRAVENOUS at 08:04

## 2019-06-07 RX ADMIN — ACETAMINOPHEN 650 MG: 325 TABLET ORAL at 16:06

## 2019-06-07 RX ADMIN — ONDANSETRON 4 MG: 2 INJECTION INTRAMUSCULAR; INTRAVENOUS at 09:15

## 2019-06-07 RX ADMIN — ENOXAPARIN SODIUM 40 MG: 40 INJECTION SUBCUTANEOUS at 09:18

## 2019-06-07 RX ADMIN — PIPERACILLIN SODIUM,TAZOBACTAM SODIUM 3.38 G: 3; .375 INJECTION, POWDER, FOR SOLUTION INTRAVENOUS at 20:53

## 2019-06-07 RX ADMIN — PIPERACILLIN SODIUM,TAZOBACTAM SODIUM 3.38 G: 3; .375 INJECTION, POWDER, FOR SOLUTION INTRAVENOUS at 02:38

## 2019-06-07 RX ADMIN — PANTOPRAZOLE SODIUM 40 MG: 40 INJECTION, POWDER, FOR SOLUTION INTRAVENOUS at 09:17

## 2019-06-07 RX ADMIN — PIPERACILLIN SODIUM,TAZOBACTAM SODIUM 3.38 G: 3; .375 INJECTION, POWDER, FOR SOLUTION INTRAVENOUS at 14:23

## 2019-06-07 RX ADMIN — OXYCODONE HYDROCHLORIDE AND ACETAMINOPHEN 2 TABLET: 5; 325 TABLET ORAL at 20:53

## 2019-06-07 ASSESSMENT — PAIN DESCRIPTION - ONSET
ONSET: ON-GOING
ONSET: ON-GOING

## 2019-06-07 ASSESSMENT — PAIN DESCRIPTION - PROGRESSION
CLINICAL_PROGRESSION: GRADUALLY WORSENING
CLINICAL_PROGRESSION: GRADUALLY IMPROVING
CLINICAL_PROGRESSION: GRADUALLY IMPROVING

## 2019-06-07 ASSESSMENT — PAIN - FUNCTIONAL ASSESSMENT: PAIN_FUNCTIONAL_ASSESSMENT: ACTIVITIES ARE NOT PREVENTED

## 2019-06-07 ASSESSMENT — PAIN DESCRIPTION - PAIN TYPE
TYPE: SURGICAL PAIN
TYPE: SURGICAL PAIN
TYPE: ACUTE PAIN;SURGICAL PAIN

## 2019-06-07 ASSESSMENT — PAIN DESCRIPTION - ORIENTATION
ORIENTATION: MID
ORIENTATION: MID

## 2019-06-07 ASSESSMENT — PAIN SCALES - GENERAL
PAINLEVEL_OUTOF10: 5
PAINLEVEL_OUTOF10: 0
PAINLEVEL_OUTOF10: 7
PAINLEVEL_OUTOF10: 0
PAINLEVEL_OUTOF10: 3

## 2019-06-07 ASSESSMENT — PAIN DESCRIPTION - FREQUENCY
FREQUENCY: CONTINUOUS
FREQUENCY: CONTINUOUS

## 2019-06-07 ASSESSMENT — PAIN DESCRIPTION - LOCATION
LOCATION: ABDOMEN
LOCATION: ABDOMEN

## 2019-06-07 ASSESSMENT — PAIN DESCRIPTION - DESCRIPTORS
DESCRIPTORS: DULL;TENDER
DESCRIPTORS: DULL

## 2019-06-07 NOTE — PROGRESS NOTES
[address] [facility] [address] Continence Nurse  Consult Note       Edouard Levin  AGE: 38 y.o.    GENDER: female  : 1975  TODAY'S DATE:  2019    Subjective:     Reason for [facility] Evaluation and Assessment: Stoma treatment and education      Edouard Levin is a 38 y.o. female referred by:   [x] Physician  [] Nursing  [] Other:     Wound Identification:  Wound Type: stoma   Contributing Factors: none        PAST MEDICAL HISTORY        Diagnosis Date    Acute esophagitis 2017    Adnexal cyst 10/24/2017    Chest pain 2017    \"a couple of yrs ago had some chest pain- did echo and everything was fine\"    History of kidney stones     \"3 or 4 yrs ago- tx with Lithotripsy- had to do two in a row\"/10/2017\"Pet scan did show I have 2 small stones now\"    Lung infiltrate on CT 2017    Lung nodule seen on imaging study 10/24/2017    scheduled for lung bx 10/25/2017\"\"in [address] had side flank pain- thought kidney stone- had CT scan done 2017-showed shadow on left lower lung- sent to Dr Bonita Quintero- did CT of chest showed nodule in both lungs- multiple on left an one right side\"    Prolonged emergence from general anesthesia     \"do have trouble waking up after surgery\"       PAST SURGICAL HISTORY    Past Surgical History:   Procedure Laterality Date    LITHOTRIPSY      PELVIC LAPAROSCOPY  1997    SMALL INTESTINE SURGERY N/A 2019    DIAGNOSTIC LAPAROSCOPY EXPLORATORY, EXPLORATORY LAPAROTOMY, DANIEL'S PROCEDURE, INCIDENTAL APPENDECTOMY, SIGMOID COLON RESECTION, DRAINAGE OF ABDOMINAL ABSCESS performed by Jillian Perdomo MD at [address] [place] [address]    Family History   Problem Relation Age of Onset    Stroke Father         TIA    High Blood Pressure Father        SOCIAL HISTORY    Social History     Tobacco Use    Smoking status: Never Smoker    Smokeless tobacco: Never Used   Substance Use Topics    Alcohol use: No    Drug use: No       ALLERGIES    Allergies   Allergen Reactions    Tetracyclines & Related Rash       MEDICATIONS    No current facility-administered medications on file prior to encounter. No current outpatient medications on file prior to encounter. Objective:      /70   Pulse 58   Temp 98.4 °F (36.9 °C) (Oral)   Resp 15   Ht 5' 4.5\" (1.638 m)   Wt 176 lb 4.8 oz (80 kg)   LMP 05/24/2019   SpO2 98%   BMI 29.79 kg/m²   Orlin Risk Score: Orlin Scale Score: 20    LABS    CBC:   Lab Results   Component Value Date    WBC 9.4 06/06/2019    RBC 2.94 06/06/2019    HGB 9.2 06/06/2019    HCT 29.4 06/06/2019    .0 06/06/2019    MCH 31.3 06/06/2019    MCHC 31.3 06/06/2019    RDW 12.6 06/06/2019     06/06/2019    MPV 9.1 06/06/2019     CMP:    Lab Results   Component Value Date     06/06/2019    K 4.5 06/06/2019     06/06/2019    CO2 26 06/06/2019    BUN 14 06/06/2019    CREATININE 0.6 06/06/2019    GFRAA >60 06/06/2019    LABGLOM >60 06/06/2019    GLUCOSE 107 06/06/2019    PROT 4.9 06/02/2019    LABALBU 2.6 06/02/2019    CALCIUM 8.1 06/06/2019    BILITOT 0.3 06/02/2019    ALKPHOS 70 06/02/2019    AST 8 06/02/2019    ALT 11 06/02/2019     Albumin:    Lab Results   Component Value Date    LABALBU 2.6 06/02/2019     PT/INR:    Lab Results   Component Value Date    PROTIME 12.7 05/29/2019    INR 1.12 05/29/2019     HgBA1c:  No results found for: LABA1C      Assessment:     Patient Active Problem List   Diagnosis    Lung infiltrate on CT    Chest pain    Lung nodule seen on imaging study    Adnexal cyst    Acute esophagitis    Perforated diverticulum of large intestine       Measurements:       Response to treatment:  Well tolerated by patient. Pain Assessment:  Severity:    Quality of pain:   Wound Pain Timing/Severity:   Premedicated:     Plan:     Plan of Care:  Incision 06/01/19 Abdomen-Dressing/Treatment: Non adherent(telfa island dressing,)    Specialty Bed Required :   [] Low Air Loss   [] Pressure Redistribution  [] Fluid

## 2019-06-07 NOTE — PROGRESS NOTES
Nutrition Assessment    Type and Reason for Visit: Reassess, Consult    Nutrition Recommendations:   · Continue weaning TPN if pt consuming at least 50% of meals   · Provide low isaias high protein oral nutrition supplement TID  · Diet advancement when medically appropriate      Nutrition Assessment: Pt recovering well from surgery. GI fxn improving with no N/V/F/C. Pt advanced to full liquid diet at this time and beginning to wean from TPN. Will provide oral nutrition supplement to optimize intake throughout weaning and continue to monitor. Pt remains at risk for malnutrition at this time. Malnutrition Assessment:  · Malnutrition Status: At risk for malnutrition  · Context: Acute illness or injury  · Findings of the 6 clinical characteristics of malnutrition (Minimum of 2 out of 6 clinical characteristics is required to make the diagnosis of moderate or severe Protein Calorie Malnutrition based on AND/ASPEN Guidelines):  1. Energy Intake-Less than or equal to 50% of estimated energy requirement, Greater than or equal to 7 days    2. Weight Loss-Unable to assess, unable to assess  3. Fat Loss-No significant subcutaneous fat loss, Orbital  4. Muscle Loss-Mild muscle mass loss, Temples (temporalis muscle)  5. Fluid Accumulation-No significant fluid accumulation, Extremities  6.  Strength-Not measured    Nutrition Risk Level:  Moderate    Nutrient Needs:  · Estimated Daily Total Kcal: 4128-1393 (25-30 kcal/kg current BW)  · Estimated Daily Protein (g): 70-87 (1.2-1.5 g/kg IBW)  · Estimated Daily Total Fluid (ml/day): 0878-8136 (1 mL/kcal)    Nutrition Diagnosis:   · Problem: Inadequate oral intake, In context of acute illness or injury  · Etiology: related to Alteration in GI function     Signs and symptoms:  as evidenced by Nutrition support - PN, Diet history of poor intake    Objective Information:  · Wound Type: Surgical Wound  · Current Nutrition Therapies:  · Oral Diet Orders: Full Liquid   · Oral Diet intake: Unable to assess  · Oral Nutrition Supplement (ONS) Orders: None  · ONS intake: Unable to assess  · Anthropometric Measures:  · Ht: 5' 4.5\" (163.8 cm)   · Current Body Wt: 127 lb (57.6 kg)  · Admission Body Wt: 127 lb (57.6 kg)  · % Weight Change:  ,  wt fluctuations likely due to fluid imbalances  · Ideal Body Wt: 120 lb (54.4 kg), % Ideal Body 105  · BMI Classification: BMI 18.5 - 24.9 Normal Weight    Nutrition Interventions:   Continue current diet, Modify current Parenteral Nutrition, Start ONS  Continued Inpatient Monitoring, Education Not Indicated, Coordination of Care    Nutrition Evaluation:   · Evaluation: Goals set   · Goals: Pt will consume at least 75% of meals and supplements provided throughout remaining los    · Monitoring: Nutrition Progression, Meal Intake, Supplement Intake, Weight, Pertinent Labs, Diet Tolerance, Monitor Bowel Function, Wound Healing      Electronically signed by Anny Escalante RD, LD on 6/7/19 at 9:34 AM    Contact Number: [phone]

## 2019-06-07 NOTE — PROGRESS NOTES
Pt with some crampy abd pain last night. No n/v/f/c  POD 6 Jackeline's. AF VSS. Excellent UOP. Some stool in appliance. abd is soft, ND, drsg c/d/i, stoma is pink. Start full liquids. Moblize. Start PO pain meds, wean PCA and TPN.

## 2019-06-07 NOTE — PLAN OF CARE
Problem: Activity:  Goal: Risk for activity intolerance will decrease  Description  Risk for activity intolerance will decrease  Outcome: Ongoing     Problem:  Bowel/Gastric:  Goal: Bowel function will improve  Description  Bowel function will improve  Outcome: Ongoing  Goal: Diagnostic test results will improve  Description  Diagnostic test results will improve  Outcome: Ongoing  Goal: Occurrences of nausea will decrease  Description  Occurrences of nausea will decrease  Outcome: Ongoing  Goal: Occurrences of vomiting will decrease  Description  Occurrences of vomiting will decrease  Outcome: Ongoing     Problem: Fluid Volume:  Goal: Maintenance of adequate hydration will improve  Description  Maintenance of adequate hydration will improve  Outcome: Ongoing     Problem: Health Behavior:  Goal: Ability to state signs and symptoms to report to health care provider will improve  Description  Ability to state signs and symptoms to report to health care provider will improve  Outcome: Ongoing     Problem: Physical Regulation:  Goal: Complications related to the disease process, condition or treatment will be avoided or minimized  Description  Complications related to the disease process, condition or treatment will be avoided or minimized  Outcome: Ongoing  Goal: Ability to maintain clinical measurements within normal limits will improve  Description  Ability to maintain clinical measurements within normal limits will improve  Outcome: Ongoing     Problem: Sensory:  Goal: Ability to identify factors that increase the pain will improve  Description  Ability to identify factors that increase the pain will improve  Outcome: Ongoing  Goal: Ability to notify healthcare provider of pain before it becomes unmanageable or unbearable will improve  Description  Ability to notify healthcare provider of pain before it becomes unmanageable or unbearable will improve  Outcome: Ongoing  Goal: Pain level will decrease  Description  Pain level will decrease  Outcome: Ongoing     Problem: Pain:  Description  Pain management should include both nonpharmacologic and pharmacologic interventions.   Goal: Pain level will decrease  Description  Pain level will decrease  Outcome: Ongoing  Goal: Control of acute pain  Description  Control of acute pain  Outcome: Ongoing  Goal: Control of chronic pain  Description  Control of chronic pain  Outcome: Ongoing     Problem: Infection:  Goal: Will remain free from infection  Description  Will remain free from infection  Outcome: Ongoing     Problem: Safety:  Goal: Free from accidental physical injury  Description  Free from accidental physical injury  Outcome: Ongoing  Goal: Free from intentional harm  Description  Free from intentional harm  Outcome: Ongoing     Problem: Daily Care:  Goal: Daily care needs are met  Description  Daily care needs are met  Outcome: Ongoing     Problem: Discharge Planning:  Goal: Patients continuum of care needs are met  Description  Patients continuum of care needs are met  Outcome: Ongoing     Problem: Nutrition  Goal: Optimal nutrition therapy  6/7/2019 1439 by Elvia Adams RN  Outcome: Ongoing  6/7/2019 0942 by Jeff Paulino RD, LD  Outcome: Ongoing

## 2019-06-08 VITALS
OXYGEN SATURATION: 96 % | SYSTOLIC BLOOD PRESSURE: 117 MMHG | HEART RATE: 52 BPM | RESPIRATION RATE: 15 BRPM | HEIGHT: 65 IN | WEIGHT: 153.9 LBS | TEMPERATURE: 97.8 F | DIASTOLIC BLOOD PRESSURE: 62 MMHG | BODY MASS INDEX: 25.64 KG/M2

## 2019-06-08 PROCEDURE — 96376 TX/PRO/DX INJ SAME DRUG ADON: CPT

## 2019-06-08 PROCEDURE — 2580000003 HC RX 258: Performed by: SURGERY

## 2019-06-08 PROCEDURE — 96375 TX/PRO/DX INJ NEW DRUG ADDON: CPT

## 2019-06-08 PROCEDURE — 96366 THER/PROPH/DIAG IV INF ADDON: CPT

## 2019-06-08 PROCEDURE — 6360000002 HC RX W HCPCS: Performed by: SURGERY

## 2019-06-08 PROCEDURE — 6370000000 HC RX 637 (ALT 250 FOR IP): Performed by: SURGERY

## 2019-06-08 RX ORDER — OXYCODONE HYDROCHLORIDE AND ACETAMINOPHEN 5; 325 MG/1; MG/1
1 TABLET ORAL EVERY 6 HOURS PRN
Qty: 28 TABLET | Refills: 0 | Status: SHIPPED | OUTPATIENT
Start: 2019-06-08 | End: 2019-06-15

## 2019-06-08 RX ADMIN — PIPERACILLIN SODIUM,TAZOBACTAM SODIUM 3.38 G: 3; .375 INJECTION, POWDER, FOR SOLUTION INTRAVENOUS at 02:00

## 2019-06-08 RX ADMIN — OXYCODONE HYDROCHLORIDE AND ACETAMINOPHEN 2 TABLET: 5; 325 TABLET ORAL at 02:01

## 2019-06-08 ASSESSMENT — PAIN SCALES - GENERAL
PAINLEVEL_OUTOF10: 5
PAINLEVEL_OUTOF10: 2
PAINLEVEL_OUTOF10: 0
PAINLEVEL_OUTOF10: 3

## 2019-06-08 ASSESSMENT — PAIN DESCRIPTION - LOCATION: LOCATION: ABDOMEN

## 2019-06-08 ASSESSMENT — PAIN DESCRIPTION - PAIN TYPE: TYPE: SURGICAL PAIN

## 2019-06-08 NOTE — PROGRESS NOTES
Spoke with pt who advised would like home care. Asked which home care pt stated CM had said [facility]. Contacted CM as assigned ED  no answer left VM.  Called [facility] to set up referral.

## 2019-06-08 NOTE — PROGRESS NOTES
Faxed facesheet and home care order to [employer] [facility] with [facility] at [phone]

## 2019-06-08 NOTE — PROGRESS NOTES
Pt has been tearful, today, overwhelmed with every thing,  Has  ambuated in chu several times, colostomy has small amt of  Brown liquid in pouch,  [employer] ntack, not eating much at all -just bites

## 2019-06-11 NOTE — DISCHARGE SUMMARY
Patient ID:  Jess Tang  [id number]  44 y.o.  1975    Admit date: 5/27/2019    Discharge date and time: 6/8/2019  6:47 PM     Admitting Physician: Higinio Cristobal MD     Admission Diagnoses: Perforated diverticulum of large intestine         Ileus    Discharge Diagnoses: same    Discharged Condition: good    Hospital Course:  Patient was admitted from the emergency room with a microperforation of a diverticulum of the large intestine. She was treated with IV antibiotics and bowel rest. On HD#3 she had a repeat CT scan which showed free fluid, attempts to drain with IR were unsuccessful. She was slowly improving and was started ion a diet. She started having bowel movements and her pain returned. She had imaging consistent with an ileus, her abdomen became more distended and on HD#6 she had severe pain after bowel movements and was noted to have free air on imaging. She had CT scan which showed large volume of ascites. She was brought to the operating room where a large volume of malodorous ascites/abscess was noted and she underwent Jackeline's procedure. She was slow to improve and once her bowel function returned she was advanced on her diet and discharged home. She was sent home with home health care and PO antibiotics and pain medication. Consults: PT/OT, , [facility]    Significant Diagnostic Studies: labs and CT's scans, plain films    Disposition: home    Patient Instructions: Activity: no lifting, Driving, or Strenuous exercise for 6 weeks  Diet: regular diet  Wound Care: as directed  Prescriptions: percocet, flagyl, augmentin    Follow-up with Dr. Zeny Malone in 1 week.     Signed:  Higinio Cristobal  6/11/2019  8:02 AM

## 2019-06-12 ENCOUNTER — TELEPHONE (OUTPATIENT)
Dept: PULMONOLOGY | Age: 44
End: 2019-06-12

## 2019-08-19 ENCOUNTER — HOSPITAL ENCOUNTER (OUTPATIENT)
Dept: WOUND CARE | Age: 44
Discharge: HOME | End: 2019-08-19
Payer: COMMERCIAL

## 2019-08-19 PROCEDURE — 99212 OFFICE O/P EST SF 10 MIN: CPT

## 2019-08-22 DIAGNOSIS — J30.9 ALLERGIC RHINITIS, UNSPECIFIED SEASONALITY, UNSPECIFIED TRIGGER: ICD-10-CM

## 2019-08-22 DIAGNOSIS — N20.0 KIDNEY STONE: ICD-10-CM

## 2019-08-22 DIAGNOSIS — F41.1 GENERALIZED ANXIETY DISORDER: ICD-10-CM

## 2019-08-22 PROBLEM — M26.629 TEMPOROMANDIBULAR JOINT TENDER: Status: ACTIVE | Noted: 2019-08-22

## 2019-08-31 ENCOUNTER — HOSPITAL ENCOUNTER (OUTPATIENT)
Age: 44
Setting detail: OBSERVATION
Discharge: HOME | End: 2019-08-31
Attending: FAMILY MEDICINE
Payer: COMMERCIAL

## 2019-08-31 VITALS
HEIGHT: 64 IN | BODY MASS INDEX: 22.2 KG/M2 | WEIGHT: 130 LBS | OXYGEN SATURATION: 99 % | HEART RATE: 60 BPM | DIASTOLIC BLOOD PRESSURE: 62 MMHG | SYSTOLIC BLOOD PRESSURE: 101 MMHG | RESPIRATION RATE: 18 BRPM

## 2019-08-31 DIAGNOSIS — N20.0 KIDNEY STONE: Primary | ICD-10-CM

## 2019-08-31 DIAGNOSIS — R10.9 RIGHT FLANK PAIN: ICD-10-CM

## 2019-08-31 PROBLEM — N13.2 HYDRONEPHROSIS WITH URETERAL CALCULUS: Status: ACTIVE | Noted: 2019-08-31

## 2019-08-31 LAB
ANION GAP SERPL CALCULATED.3IONS-SCNC: 12 MMOL/L (ref 4–16)
BACTERIA: ABNORMAL /HPF
BASOPHILS ABSOLUTE: 0 K/CU MM
BASOPHILS RELATIVE PERCENT: 0.5 % (ref 0–1)
BILIRUBIN URINE: NEGATIVE MG/DL
BLOOD, URINE: ABNORMAL
BUN BLDV-MCNC: 17 MG/DL (ref 6–23)
CALCIUM SERPL-MCNC: 9.8 MG/DL (ref 8.3–10.6)
CHLORIDE BLD-SCNC: 101 MMOL/L (ref 99–110)
CLARITY: ABNORMAL
CO2: 25 MMOL/L (ref 21–32)
COLOR: YELLOW
CREAT SERPL-MCNC: 0.8 MG/DL (ref 0.6–1.1)
DIFFERENTIAL TYPE: ABNORMAL
EOSINOPHILS ABSOLUTE: 0.1 K/CU MM
EOSINOPHILS RELATIVE PERCENT: 0.8 % (ref 0–3)
GFR AFRICAN AMERICAN: >60 ML/MIN/1.73M2
GFR NON-AFRICAN AMERICAN: >60 ML/MIN/1.73M2
GLUCOSE BLD-MCNC: 143 MG/DL (ref 70–99)
GLUCOSE, URINE: NEGATIVE MG/DL
HCT VFR BLD CALC: 39.6 % (ref 37–47)
HEMOGLOBIN: 13 GM/DL (ref 12.5–16)
IMMATURE NEUTROPHIL %: 0.4 % (ref 0–0.43)
KETONES, URINE: ABNORMAL MG/DL
LEUKOCYTE ESTERASE, URINE: ABNORMAL
LYMPHOCYTES ABSOLUTE: 1.2 K/CU MM
LYMPHOCYTES RELATIVE PERCENT: 15.4 % (ref 24–44)
MCH RBC QN AUTO: 31.6 PG (ref 27–31)
MCHC RBC AUTO-ENTMCNC: 32.8 % (ref 32–36)
MCV RBC AUTO: 96.1 FL (ref 78–100)
MONOCYTES ABSOLUTE: 0.4 K/CU MM
MONOCYTES RELATIVE PERCENT: 5.1 % (ref 0–4)
MUCUS: ABNORMAL HPF
NITRITE URINE, QUANTITATIVE: NEGATIVE
NUCLEATED RBC %: 0 %
PDW BLD-RTO: 13.2 % (ref 11.7–14.9)
PH, URINE: 6 (ref 5–8)
PLATELET # BLD: 246 K/CU MM (ref 140–440)
PMV BLD AUTO: 11.1 FL (ref 7.5–11.1)
POTASSIUM SERPL-SCNC: 3.5 MMOL/L (ref 3.5–5.1)
PROTEIN UA: 100 MG/DL
RBC # BLD: 4.12 M/CU MM (ref 4.2–5.4)
RBC URINE: 1803 /HPF (ref 0–6)
SEGMENTED NEUTROPHILS ABSOLUTE COUNT: 5.8 K/CU MM
SEGMENTED NEUTROPHILS RELATIVE PERCENT: 77.8 % (ref 36–66)
SODIUM BLD-SCNC: 138 MMOL/L (ref 135–145)
SPECIFIC GRAVITY UA: 1.02 (ref 1–1.03)
SQUAMOUS EPITHELIAL: 22 /HPF
TOTAL IMMATURE NEUTOROPHIL: 0.03 K/CU MM
TOTAL NUCLEATED RBC: 0 K/CU MM
TRICHOMONAS: ABNORMAL /HPF
UROBILINOGEN, URINE: NORMAL MG/DL (ref 0.2–1)
WBC # BLD: 7.5 K/CU MM (ref 4–10.5)
WBC UA: 37 /HPF (ref 0–5)
YEAST: ABNORMAL /HPF

## 2019-08-31 PROCEDURE — 96361 HYDRATE IV INFUSION ADD-ON: CPT

## 2019-08-31 PROCEDURE — 6370000000 HC RX 637 (ALT 250 FOR IP): Performed by: FAMILY MEDICINE

## 2019-08-31 PROCEDURE — 96376 TX/PRO/DX INJ SAME DRUG ADON: CPT

## 2019-08-31 PROCEDURE — 96365 THER/PROPH/DIAG IV INF INIT: CPT

## 2019-08-31 PROCEDURE — 96375 TX/PRO/DX INJ NEW DRUG ADDON: CPT

## 2019-08-31 PROCEDURE — 99284 EMERGENCY DEPT VISIT MOD MDM: CPT

## 2019-08-31 PROCEDURE — 81001 URINALYSIS AUTO W/SCOPE: CPT

## 2019-08-31 PROCEDURE — 6360000002 HC RX W HCPCS: Performed by: FAMILY MEDICINE

## 2019-08-31 PROCEDURE — 87086 URINE CULTURE/COLONY COUNT: CPT

## 2019-08-31 PROCEDURE — G0378 HOSPITAL OBSERVATION PER HR: HCPCS

## 2019-08-31 PROCEDURE — 6360000002 HC RX W HCPCS

## 2019-08-31 PROCEDURE — 80048 BASIC METABOLIC PNL TOTAL CA: CPT

## 2019-08-31 PROCEDURE — 2580000003 HC RX 258: Performed by: FAMILY MEDICINE

## 2019-08-31 PROCEDURE — 85025 COMPLETE CBC W/AUTO DIFF WBC: CPT

## 2019-08-31 RX ORDER — MORPHINE SULFATE 4 MG/ML
4 INJECTION, SOLUTION INTRAMUSCULAR; INTRAVENOUS ONCE
Status: COMPLETED | OUTPATIENT
Start: 2019-08-31 | End: 2019-08-31

## 2019-08-31 RX ORDER — PHENAZOPYRIDINE HYDROCHLORIDE 100 MG/1
200 TABLET, FILM COATED ORAL ONCE
Status: COMPLETED | OUTPATIENT
Start: 2019-08-31 | End: 2019-08-31

## 2019-08-31 RX ORDER — PROMETHAZINE HYDROCHLORIDE 25 MG/1
25 TABLET ORAL ONCE
Status: COMPLETED | OUTPATIENT
Start: 2019-08-31 | End: 2019-08-31

## 2019-08-31 RX ORDER — MORPHINE SULFATE 4 MG/ML
INJECTION, SOLUTION INTRAMUSCULAR; INTRAVENOUS
Status: COMPLETED
Start: 2019-08-31 | End: 2019-08-31

## 2019-08-31 RX ORDER — SODIUM CHLORIDE 9 MG/ML
INJECTION, SOLUTION INTRAVENOUS CONTINUOUS
Status: CANCELLED | OUTPATIENT
Start: 2019-08-31

## 2019-08-31 RX ORDER — ONDANSETRON 2 MG/ML
8 INJECTION INTRAMUSCULAR; INTRAVENOUS ONCE
Status: COMPLETED | OUTPATIENT
Start: 2019-08-31 | End: 2019-08-31

## 2019-08-31 RX ORDER — NAPROXEN 500 MG/1
500 TABLET ORAL 2 TIMES DAILY WITH MEALS
Qty: 14 TABLET | Refills: 0 | Status: SHIPPED | OUTPATIENT
Start: 2019-08-31 | End: 2019-08-31 | Stop reason: SDUPTHER

## 2019-08-31 RX ORDER — KETOROLAC TROMETHAMINE 30 MG/ML
30 INJECTION, SOLUTION INTRAMUSCULAR; INTRAVENOUS EVERY 6 HOURS PRN
Status: DISCONTINUED | OUTPATIENT
Start: 2019-08-31 | End: 2019-08-31 | Stop reason: HOSPADM

## 2019-08-31 RX ORDER — ONDANSETRON 4 MG/1
4 TABLET, FILM COATED ORAL 3 TIMES DAILY PRN
Qty: 30 TABLET | Refills: 0 | Status: SHIPPED | OUTPATIENT
Start: 2019-08-31 | End: 2019-08-31 | Stop reason: SDUPTHER

## 2019-08-31 RX ORDER — PHENAZOPYRIDINE HYDROCHLORIDE 100 MG/1
100 TABLET, FILM COATED ORAL 2 TIMES DAILY
Qty: 14 TABLET | Refills: 0 | Status: SHIPPED | OUTPATIENT
Start: 2019-08-31 | End: 2019-09-07

## 2019-08-31 RX ORDER — 0.9 % SODIUM CHLORIDE 0.9 %
2000 INTRAVENOUS SOLUTION INTRAVENOUS ONCE
Status: COMPLETED | OUTPATIENT
Start: 2019-08-31 | End: 2019-08-31

## 2019-08-31 RX ORDER — NAPROXEN 250 MG/1
500 TABLET ORAL ONCE
Status: COMPLETED | OUTPATIENT
Start: 2019-08-31 | End: 2019-08-31

## 2019-08-31 RX ORDER — ONDANSETRON 4 MG/1
4 TABLET, FILM COATED ORAL 3 TIMES DAILY PRN
Qty: 30 TABLET | Refills: 0 | Status: SHIPPED | OUTPATIENT
Start: 2019-08-31 | End: 2019-09-26

## 2019-08-31 RX ORDER — KETOROLAC TROMETHAMINE 30 MG/ML
30 INJECTION, SOLUTION INTRAMUSCULAR; INTRAVENOUS ONCE
Status: COMPLETED | OUTPATIENT
Start: 2019-08-31 | End: 2019-08-31

## 2019-08-31 RX ORDER — MORPHINE SULFATE 4 MG/ML
4 INJECTION, SOLUTION INTRAMUSCULAR; INTRAVENOUS EVERY 4 HOURS PRN
Status: DISCONTINUED | OUTPATIENT
Start: 2019-08-31 | End: 2019-08-31 | Stop reason: HOSPADM

## 2019-08-31 RX ORDER — SODIUM CHLORIDE, SODIUM LACTATE, POTASSIUM CHLORIDE, CALCIUM CHLORIDE 600; 310; 30; 20 MG/100ML; MG/100ML; MG/100ML; MG/100ML
INJECTION, SOLUTION INTRAVENOUS ONCE
Status: COMPLETED | OUTPATIENT
Start: 2019-08-31 | End: 2019-08-31

## 2019-08-31 RX ORDER — ACETAMINOPHEN 325 MG/1
650 TABLET ORAL EVERY 4 HOURS PRN
Status: CANCELLED | OUTPATIENT
Start: 2019-08-31

## 2019-08-31 RX ORDER — MORPHINE SULFATE 4 MG/ML
4 INJECTION, SOLUTION INTRAMUSCULAR; INTRAVENOUS ONCE
Status: DISCONTINUED | OUTPATIENT
Start: 2019-08-31 | End: 2019-08-31 | Stop reason: HOSPADM

## 2019-08-31 RX ORDER — SODIUM CHLORIDE 0.9 % (FLUSH) 0.9 %
10 SYRINGE (ML) INJECTION EVERY 12 HOURS SCHEDULED
Status: CANCELLED | OUTPATIENT
Start: 2019-08-31

## 2019-08-31 RX ORDER — NAPROXEN 500 MG/1
500 TABLET ORAL 2 TIMES DAILY WITH MEALS
Qty: 14 TABLET | Refills: 0 | Status: SHIPPED | OUTPATIENT
Start: 2019-08-31 | End: 2019-09-26

## 2019-08-31 RX ORDER — SODIUM CHLORIDE 0.9 % (FLUSH) 0.9 %
10 SYRINGE (ML) INJECTION PRN
Status: CANCELLED | OUTPATIENT
Start: 2019-08-31

## 2019-08-31 RX ORDER — PHENAZOPYRIDINE HYDROCHLORIDE 100 MG/1
100 TABLET, FILM COATED ORAL 2 TIMES DAILY
Qty: 14 TABLET | Refills: 0 | Status: SHIPPED | OUTPATIENT
Start: 2019-08-31 | End: 2019-08-31 | Stop reason: SDUPTHER

## 2019-08-31 RX ORDER — ONDANSETRON 2 MG/ML
4 INJECTION INTRAMUSCULAR; INTRAVENOUS EVERY 6 HOURS PRN
Status: CANCELLED | OUTPATIENT
Start: 2019-08-31

## 2019-08-31 RX ADMIN — MORPHINE SULFATE 4 MG: 4 INJECTION, SOLUTION INTRAMUSCULAR; INTRAVENOUS at 06:55

## 2019-08-31 RX ADMIN — ONDANSETRON 8 MG: 2 INJECTION INTRAMUSCULAR; INTRAVENOUS at 03:50

## 2019-08-31 RX ADMIN — NAPROXEN 500 MG: 250 TABLET ORAL at 05:31

## 2019-08-31 RX ADMIN — SODIUM CHLORIDE, POTASSIUM CHLORIDE, SODIUM LACTATE AND CALCIUM CHLORIDE: 600; 310; 30; 20 INJECTION, SOLUTION INTRAVENOUS at 07:14

## 2019-08-31 RX ADMIN — KETOROLAC TROMETHAMINE 30 MG: 30 INJECTION, SOLUTION INTRAMUSCULAR; INTRAVENOUS at 04:01

## 2019-08-31 RX ADMIN — PHENAZOPYRIDINE HYDROCHLORIDE 200 MG: 100 TABLET ORAL at 03:50

## 2019-08-31 RX ADMIN — SODIUM CHLORIDE 2000 ML: 9 INJECTION, SOLUTION INTRAVENOUS at 03:50

## 2019-08-31 RX ADMIN — PROMETHAZINE HYDROCHLORIDE 25 MG: 25 TABLET ORAL at 05:31

## 2019-08-31 RX ADMIN — MORPHINE SULFATE 4 MG: 4 INJECTION, SOLUTION INTRAMUSCULAR; INTRAVENOUS at 03:50

## 2019-08-31 RX ADMIN — LIDOCAINE HYDROCHLORIDE 90 MG: 20 INJECTION, SOLUTION INTRAVENOUS at 04:01

## 2019-08-31 ASSESSMENT — PAIN SCALES - GENERAL
PAINLEVEL_OUTOF10: 0
PAINLEVEL_OUTOF10: 7

## 2019-08-31 ASSESSMENT — PAIN DESCRIPTION - ORIENTATION: ORIENTATION: RIGHT

## 2019-08-31 ASSESSMENT — PAIN DESCRIPTION - PAIN TYPE: TYPE: ACUTE PAIN

## 2019-08-31 ASSESSMENT — PAIN DESCRIPTION - LOCATION: LOCATION: FLANK

## 2019-08-31 NOTE — ED PROVIDER NOTES
Normal bowel sounds. Soft. No peritoneal signs. No hepatosplenomegaly. Right lower quadrant tenderness without peritoneal signs  Back:   RCVA tenderness to palpation. No bruising. No CTL tenderness to palpation or step-off  Neurological:  Alert and oriented times 3. No focal neuro deficits. Cranial nerves II through XII are grossly intact.           Psychiatric:  Appropriate    I have reviewed and interpreted all of the currently available lab results from this visit (if applicable):  Results for orders placed or performed during the hospital encounter of 08/31/19   CBC Auto Differential   Result Value Ref Range    WBC 7.5 4.0 - 10.5 K/CU MM    RBC 4.12 (L) 4.2 - 5.4 M/CU MM    Hemoglobin 13.0 12.5 - 16.0 GM/DL    Hematocrit 39.6 37 - 47 %    MCV 96.1 78 - 100 FL    MCH 31.6 (H) 27 - 31 PG    MCHC 32.8 32.0 - 36.0 %    RDW 13.2 11.7 - 14.9 %    Platelets 098 631 - 070 K/CU MM    MPV 11.1 7.5 - 11.1 FL    Differential Type AUTOMATED DIFFERENTIAL     Segs Relative 77.8 (H) 36 - 66 %    Lymphocytes % 15.4 (L) 24 - 44 %    Monocytes % 5.1 (H) 0 - 4 %    Eosinophils % 0.8 0 - 3 %    Basophils % 0.5 0 - 1 %    Segs Absolute 5.8 K/CU MM    Lymphocytes Absolute 1.2 K/CU MM    Monocytes Absolute 0.4 K/CU MM    Eosinophils Absolute 0.1 K/CU MM    Basophils Absolute 0.0 K/CU MM    Nucleated RBC % 0.0 %    Total Nucleated RBC 0.0 K/CU MM    Total Immature Neutrophil 0.03 K/CU MM    Immature Neutrophil % 0.4 0 - 0.43 %   Urinalysis Reflex to Culture   Result Value Ref Range    Color, UA YELLOW YELLOW    Clarity, UA SLIGHTLY CLOUDY (A) CLEAR    Glucose, Urine NEGATIVE NEGATIVE MG/DL    Bilirubin Urine NEGATIVE NEGATIVE MG/DL    Ketones, Urine MODERATE (A) NEGATIVE MG/DL    Specific Gravity, UA 1.020 1.001 - 1.035    Blood, Urine LARGE (A) NEGATIVE    pH, Urine 6.0 5.0 - 8.0    Protein,  (A) NEGATIVE MG/DL    Urobilinogen, Urine NORMAL 0.2 - 1.0 MG/DL    Nitrite Urine, Quantitative NEGATIVE NEGATIVE    Leukocyte

## 2019-08-31 NOTE — CONSULTS
spinous processes are non-tender on palpation, paraspinous muscles are non-tender on palpation, no costal vertebral tenderness  LUNGS:  No increased work of breathing  ABDOMEN:  Stoma, soft, non-distended, non-tender, no masses palpated, no hepatosplenomegally    Data:    WBC:    Lab Results   Component Value Date    WBC 7.5 08/31/2019     Hemoglobin/Hematocrit:    Lab Results   Component Value Date    HGB 13.0 08/31/2019    HCT 39.6 08/31/2019     BMP:    Lab Results   Component Value Date     08/31/2019    K 3.5 08/31/2019     08/31/2019    CO2 25 08/31/2019    BUN 17 08/31/2019    LABALBU 2.6 06/02/2019    CREATININE 0.8 08/31/2019    CALCIUM 9.8 08/31/2019    GFRAA >60 08/31/2019    LABGLOM >60 08/31/2019     PT/INR:    Lab Results   Component Value Date    PROTIME 12.7 05/29/2019    INR 1.12 05/29/2019       Impression: 39 yo female with intractable right renal colic      Recommendation: not NPO - had planned on intervention but can't perform (per pt ED doc told her to drink a liter of water). She currently feels better & would like to go home rather than being admitted IF her pain stays at [place] [id number]. If recurs then would keep her in house to be added on for tomorrow. If pain is tolerable she will keep her scheduled surgery currently set for 9/3/19 with Dr. Kassandra Mittal at [facility]. Patient seen and examined, chart reviewed.      Miguelina Rubio MD     Electronically signed at 8/31/2019

## 2019-08-31 NOTE — ED NOTES
Per Dr. Sandy Currie, and Dr. Merrill Emerson patient is waiting in ED prior to admission or discharge decision post pain medication administration. Pt is to wait in ED to evaluate pain for 1 hour at this time prior to decision.      Dede Ely RN  99/65/42 [id number]

## 2019-09-01 LAB
CULTURE: ABNORMAL
Lab: ABNORMAL
SPECIMEN: ABNORMAL
TOTAL COLONY COUNT: ABNORMAL

## 2019-09-12 ENCOUNTER — HOSPITAL ENCOUNTER (OUTPATIENT)
Age: 44
Setting detail: SPECIMEN
Discharge: HOME | End: 2019-09-12

## 2019-09-12 LAB
ABO/RH: NORMAL
ANTIBODY SCREEN: NEGATIVE
COMMENT: NORMAL

## 2019-09-12 PROCEDURE — 86900 BLOOD TYPING SEROLOGIC ABO: CPT

## 2019-09-12 PROCEDURE — 86850 RBC ANTIBODY SCREEN: CPT

## 2019-09-12 PROCEDURE — 86901 BLOOD TYPING SEROLOGIC RH(D): CPT

## 2022-06-27 DIAGNOSIS — R10.9 ABDOMINAL PAIN, UNSPECIFIED ABDOMINAL LOCATION: ICD-10-CM

## 2022-06-27 DIAGNOSIS — R14.0 ABDOMINAL BLOATING: ICD-10-CM

## 2022-06-27 DIAGNOSIS — Z87.19 HISTORY OF DIVERTICULITIS: ICD-10-CM

## 2023-01-11 ENCOUNTER — HOSPITAL ENCOUNTER (EMERGENCY)
Age: 48
Discharge: HOME | End: 2023-01-11
Attending: EMERGENCY MEDICINE
Payer: COMMERCIAL

## 2023-01-11 ENCOUNTER — APPOINTMENT (OUTPATIENT)
Dept: CT IMAGING | Age: 48
End: 2023-01-11
Payer: COMMERCIAL

## 2023-01-11 VITALS
TEMPERATURE: 97.6 F | SYSTOLIC BLOOD PRESSURE: 110 MMHG | DIASTOLIC BLOOD PRESSURE: 66 MMHG | RESPIRATION RATE: 16 BRPM | HEART RATE: 50 BPM | OXYGEN SATURATION: 100 %

## 2023-01-11 DIAGNOSIS — N20.1 URETEROLITHIASIS: Primary | ICD-10-CM

## 2023-01-11 DIAGNOSIS — N13.2 HYDRONEPHROSIS WITH URINARY OBSTRUCTION DUE TO URETERAL CALCULUS: ICD-10-CM

## 2023-01-11 LAB
ALBUMIN SERPL-MCNC: 4.8 GM/DL (ref 3.4–5)
ALP BLD-CCNC: 72 IU/L (ref 40–129)
ALT SERPL-CCNC: 11 U/L (ref 10–40)
ANION GAP SERPL CALCULATED.3IONS-SCNC: 14 MMOL/L (ref 4–16)
AST SERPL-CCNC: 14 IU/L (ref 15–37)
BACTERIA: ABNORMAL /HPF
BASOPHILS ABSOLUTE: 0.1 K/CU MM
BASOPHILS RELATIVE PERCENT: 0.5 % (ref 0–1)
BILIRUB SERPL-MCNC: 0.5 MG/DL (ref 0–1)
BILIRUBIN URINE: NEGATIVE MG/DL
BLOOD, URINE: ABNORMAL
BUN BLDV-MCNC: 19 MG/DL (ref 6–23)
CALCIUM SERPL-MCNC: 10.2 MG/DL (ref 8.3–10.6)
CHLORIDE BLD-SCNC: 103 MMOL/L (ref 99–110)
CLARITY: CLEAR
CO2: 21 MMOL/L (ref 21–32)
COLOR: YELLOW
CREAT SERPL-MCNC: 0.8 MG/DL (ref 0.6–1.1)
DIFFERENTIAL TYPE: ABNORMAL
EOSINOPHILS ABSOLUTE: 0 K/CU MM
EOSINOPHILS RELATIVE PERCENT: 0.3 % (ref 0–3)
GFR SERPL CREATININE-BSD FRML MDRD: >60 ML/MIN/1.73M2
GLUCOSE BLD-MCNC: 112 MG/DL (ref 70–99)
GLUCOSE, URINE: NEGATIVE MG/DL
GONADOTROPIN, CHORIONIC (HCG) QUANT: 2.8 UIU/ML
HCT VFR BLD CALC: 44.5 % (ref 37–47)
HEMOGLOBIN: 15.1 GM/DL (ref 12.5–16)
IMMATURE NEUTROPHIL %: 0.4 % (ref 0–0.43)
KETONES, URINE: 15 MG/DL
LEUKOCYTE ESTERASE, URINE: ABNORMAL
LIPASE: 26 IU/L (ref 13–60)
LYMPHOCYTES ABSOLUTE: 2.1 K/CU MM
LYMPHOCYTES RELATIVE PERCENT: 15 % (ref 24–44)
MCH RBC QN AUTO: 31.8 PG (ref 27–31)
MCHC RBC AUTO-ENTMCNC: 33.9 % (ref 32–36)
MCV RBC AUTO: 93.7 FL (ref 78–100)
MONOCYTES ABSOLUTE: 0.6 K/CU MM
MONOCYTES RELATIVE PERCENT: 4.1 % (ref 0–4)
MUCUS: ABNORMAL HPF
NITRITE URINE, QUANTITATIVE: NEGATIVE
NUCLEATED RBC %: 0 %
PDW BLD-RTO: 12.2 % (ref 11.7–14.9)
PH, URINE: 8.5 (ref 5–8)
PLATELET # BLD: 241 K/CU MM (ref 140–440)
PMV BLD AUTO: 11.6 FL (ref 7.5–11.1)
POTASSIUM SERPL-SCNC: 4.1 MMOL/L (ref 3.5–5.1)
PROTEIN UA: 30 MG/DL
RBC # BLD: 4.75 M/CU MM (ref 4.2–5.4)
RBC URINE: 93 /HPF (ref 0–6)
SEGMENTED NEUTROPHILS ABSOLUTE COUNT: 11.3 K/CU MM
SEGMENTED NEUTROPHILS RELATIVE PERCENT: 79.7 % (ref 36–66)
SODIUM BLD-SCNC: 138 MMOL/L (ref 135–145)
SPECIFIC GRAVITY UA: 1.01 (ref 1–1.03)
SQUAMOUS EPITHELIAL: 3 /HPF
TOTAL IMMATURE NEUTOROPHIL: 0.06 K/CU MM
TOTAL NUCLEATED RBC: 0 K/CU MM
TOTAL PROTEIN: 8.6 GM/DL (ref 6.4–8.2)
TRICHOMONAS: ABNORMAL /HPF
UROBILINOGEN, URINE: 0.2 MG/DL (ref 0.2–1)
WBC # BLD: 14.1 K/CU MM (ref 4–10.5)
WBC UA: 5 /HPF (ref 0–5)

## 2023-01-11 PROCEDURE — 6360000002 HC RX W HCPCS: Performed by: EMERGENCY MEDICINE

## 2023-01-11 PROCEDURE — 85025 COMPLETE CBC W/AUTO DIFF WBC: CPT

## 2023-01-11 PROCEDURE — 83690 ASSAY OF LIPASE: CPT

## 2023-01-11 PROCEDURE — 80053 COMPREHEN METABOLIC PANEL: CPT

## 2023-01-11 PROCEDURE — 96375 TX/PRO/DX INJ NEW DRUG ADDON: CPT

## 2023-01-11 PROCEDURE — 2580000003 HC RX 258: Performed by: EMERGENCY MEDICINE

## 2023-01-11 PROCEDURE — 84702 CHORIONIC GONADOTROPIN TEST: CPT

## 2023-01-11 PROCEDURE — 74176 CT ABD & PELVIS W/O CONTRAST: CPT

## 2023-01-11 PROCEDURE — 81001 URINALYSIS AUTO W/SCOPE: CPT

## 2023-01-11 PROCEDURE — 99284 EMERGENCY DEPT VISIT MOD MDM: CPT

## 2023-01-11 PROCEDURE — 96374 THER/PROPH/DIAG INJ IV PUSH: CPT

## 2023-01-11 PROCEDURE — 96376 TX/PRO/DX INJ SAME DRUG ADON: CPT

## 2023-01-11 RX ORDER — KETOROLAC TROMETHAMINE 30 MG/ML
30 INJECTION, SOLUTION INTRAMUSCULAR; INTRAVENOUS ONCE
Status: COMPLETED | OUTPATIENT
Start: 2023-01-11 | End: 2023-01-11

## 2023-01-11 RX ORDER — MORPHINE SULFATE 4 MG/ML
4 INJECTION, SOLUTION INTRAMUSCULAR; INTRAVENOUS ONCE
Status: COMPLETED | OUTPATIENT
Start: 2023-01-11 | End: 2023-01-11

## 2023-01-11 RX ORDER — ONDANSETRON 4 MG/1
4 TABLET, ORALLY DISINTEGRATING ORAL 3 TIMES DAILY PRN
Qty: 21 TABLET | Refills: 0 | Status: SHIPPED | OUTPATIENT
Start: 2023-01-11

## 2023-01-11 RX ORDER — 0.9 % SODIUM CHLORIDE 0.9 %
1000 INTRAVENOUS SOLUTION INTRAVENOUS ONCE
Status: COMPLETED | OUTPATIENT
Start: 2023-01-11 | End: 2023-01-11

## 2023-01-11 RX ORDER — ONDANSETRON 2 MG/ML
4 INJECTION INTRAMUSCULAR; INTRAVENOUS EVERY 6 HOURS PRN
Status: DISCONTINUED | OUTPATIENT
Start: 2023-01-11 | End: 2023-01-11 | Stop reason: HOSPADM

## 2023-01-11 RX ORDER — ONDANSETRON 2 MG/ML
4 INJECTION INTRAMUSCULAR; INTRAVENOUS ONCE
Status: COMPLETED | OUTPATIENT
Start: 2023-01-11 | End: 2023-01-11

## 2023-01-11 RX ORDER — HYDROCODONE BITARTRATE AND ACETAMINOPHEN 5; 325 MG/1; MG/1
1 TABLET ORAL EVERY 6 HOURS PRN
Qty: 12 TABLET | Refills: 0 | Status: SHIPPED | OUTPATIENT
Start: 2023-01-11 | End: 2023-01-14

## 2023-01-11 RX ORDER — IBUPROFEN 600 MG/1
600 TABLET ORAL 3 TIMES DAILY PRN
Qty: 30 TABLET | Refills: 0 | Status: SHIPPED | OUTPATIENT
Start: 2023-01-11

## 2023-01-11 RX ADMIN — KETOROLAC TROMETHAMINE 30 MG: 30 INJECTION, SOLUTION INTRAMUSCULAR; INTRAVENOUS at 18:06

## 2023-01-11 RX ADMIN — SODIUM CHLORIDE 1000 ML: 9 INJECTION, SOLUTION INTRAVENOUS at 18:07

## 2023-01-11 RX ADMIN — MORPHINE SULFATE 4 MG: 4 INJECTION, SOLUTION INTRAMUSCULAR; INTRAVENOUS at 18:07

## 2023-01-11 RX ADMIN — MORPHINE SULFATE 4 MG: 4 INJECTION, SOLUTION INTRAMUSCULAR; INTRAVENOUS at 20:25

## 2023-01-11 RX ADMIN — ONDANSETRON 4 MG: 2 INJECTION INTRAMUSCULAR; INTRAVENOUS at 20:36

## 2023-01-11 RX ADMIN — ONDANSETRON 4 MG: 2 INJECTION INTRAMUSCULAR; INTRAVENOUS at 18:07

## 2023-01-11 ASSESSMENT — PAIN SCALES - GENERAL
PAINLEVEL_OUTOF10: 5
PAINLEVEL_OUTOF10: 10

## 2023-01-11 ASSESSMENT — PAIN DESCRIPTION - LOCATION
LOCATION: FLANK
LOCATION: FLANK

## 2023-01-11 ASSESSMENT — PAIN DESCRIPTION - ORIENTATION: ORIENTATION: LEFT

## 2023-01-12 NOTE — ED PROVIDER NOTES
Emergency Department Encounter    Patient: Domenico Umana  MRN: [id number]  : 1975  Date of Evaluation: 2023  ED Provider:  Jessica Acosta DO    Triage Chief Complaint:   Abdominal Pain and Flank Pain (Left side)    Ewiiaapaayp:  Doemnico Umana is a 49 y.o. female that presents to the emergency department complaint of sudden onset of left flank pain that started today. She states nausea vomiting diarrhea. Patient has history of kidney stones feels similar. Patient states pain 10 out of 10 on the pain scale. Patient states he has not taken any pain medication. Patient states she also has a history of colon resection and diverticulitis. Patient states she just does not feel well. Patient actively dry heaving upon arrival.  Patient denies any chest pain shortness of breath fever chills cough sore throat runny nose earache. ROS - see HPI, below listed is current ROS at time of my eval:  General:  No fevers, no chills, no weakness  Eyes:  No recent vison changes, no discharge  ENT:  No sore throat, no nasal congestion, no hearing changes  Cardiovascular:  No chest pain, no palpitations  Respiratory:  No shortness of breath, no cough, no wheezing  Gastrointestinal: Positive for left-sided abdominal pain flank pain nausea vomiting and diarrhea.   Musculoskeletal:  No muscle pain, no joint pain  Skin:  No rash, no pruritis, no easy bruising  Neurologic:  No speech problems, no headache, no extremity numbness, no extremity tingling, no extremity weakness  Psychiatric:  No anxiety  Genitourinary:  No dysuria, no hematuria  Endocrine:  No unexpected weight gain, no unexpected weight loss  Extremities:  no edema, no pain    Past Medical History:   Diagnosis Date    Acute esophagitis 2017    Adnexal cyst 10/24/2017    Allergic rhinitis 2019    Chest pain 2017    \"a couple of yrs ago had some chest pain- did echo and everything was fine\"    Generalized anxiety disorder 2019    History of kidney stones     \"3 or 4 yrs ago- tx with Lithotripsy- had to do two in a row\"/10/2017\"Pet scan did show I have 2 small stones now\"    Kidney stone 8/22/2019    Lung infiltrate on CT 9/25/2017    Lung nodule seen on imaging study 10/24/2017    scheduled for lung bx 10/25/2017\"\"[address] had side flank pain- thought kidney stone- had CT scan done 9/2017-showed shadow on left lower lung- sent to Dr Roxy Cortez- did CT of chest showed nodule in both lungs- multiple on left an one right side\"    Prolonged emergence from general anesthesia     \"do have trouble waking up after surgery\"    Temporomandibular joint tender 8/22/2019     Past Surgical History:   Procedure Laterality Date    LITHOTRIPSY  2013    [facility] N/A 6/1/2019    DIAGNOSTIC LAPAROSCOPY EXPLORATORY, EXPLORATORY LAPAROTOMY, DANIEL'S PROCEDURE, INCIDENTAL APPENDECTOMY, SIGMOID COLON RESECTION, DRAINAGE OF ABDOMINAL ABSCESS performed by Arabella Mcconnell MD at [address] [place] [address] OR     Family History   Problem Relation Age of Onset    Stroke Father         TIA    High Blood Pressure Father      Social History     Socioeconomic History    Marital status:      Spouse name: Not on file    Number of children: Not on file    Years of education: Not on file    Highest education level: Not on file   Occupational History    Not on file   Tobacco Use    Smoking status: Never    Smokeless tobacco: Never   Substance and Sexual Activity    Alcohol use: No    Drug use: No    Sexual activity: Not on file   Other Topics Concern    Not on file   Social History Narrative    Not on file     Social Determinants of Health     Financial Resource Strain: Not on file   Food Insecurity: Not on file   Transportation Needs: Not on file   Physical Activity: Not on file   Stress: Not on file   Social Connections: Not on file   Intimate Partner Violence: Not on file   Housing Stability: Not on file     Current Facility-Administered Medications Medication Dose Route Frequency Provider Last Rate Last Admin    ondansetron (ZOFRAN) injection 4 mg  4 mg IntraVENous Q6H PRN Juanpablo Close, DO   4 mg at 01/11/23 1807    morphine sulfate (PF) injection 4 mg  4 mg IntraVENous Once Salome Mckinnon Oz, DO         Current Outpatient Medications   Medication Sig Dispense Refill    HYDROcodone-acetaminophen (NORCO) 5-325 MG per tablet Take 1 tablet by mouth every 6 hours as needed for Pain for up to 3 days. Intended supply: 3 days. Take lowest dose possible to manage pain Max Daily Amount: 4 tablets 12 tablet 0    ibuprofen (ADVIL;MOTRIN) 600 MG tablet Take 1 tablet by mouth 3 times daily as needed for Pain 30 tablet 0    ondansetron (ZOFRAN-ODT) 4 MG disintegrating tablet Take 1 tablet by mouth 3 times daily as needed for Nausea or Vomiting 21 tablet 0     Allergies   Allergen Reactions    Sulfamethoxazole     Tetracyclines & Related Rash       Nursing Notes Reviewed    Physical Exam:  Triage VS:    ED Triage Vitals [01/11/23 1630]   Enc Vitals Group      BP (!) 113/47      Heart Rate 61      Resp 18      Temp 97.6 °F (36.4 °C)      Temp src       SpO2 100 %      Weight       Height       Head Circumference       Peak Flow       Pain Score       Pain Loc       Pain Edu? Excl. in [address]? /66   Pulse 50   Temp 97.6 °F (36.4 °C)   Resp 16   SpO2 100%       My pulse ox interpretation is - normal    General appearance:  No acute distress. Skin:  Warm. Dry. Eye:  Extraocular movements intact. Ears, nose, mouth and throat:  Oral mucosa moist   Neck:  Trachea midline. Extremity:  No swelling. Normal ROM     Heart:  Regular rate and rhythm, normal S1 & S2, no extra heart sounds. Perfusion:  intact  Respiratory:  Lungs clear to auscultation bilaterally. Respirations nonlabored. Abdominal:  Normal bowel sounds. Soft. Left side abdominal to palpation. Non distended.   Back: Positive left CVA tenderness to palpation     Neurological: Alert and oriented times 3. No focal neuro deficits.              Psychiatric:  Appropriate    I have reviewed and interpreted all of the currently available lab results from this visit (if applicable):  Results for orders placed or performed during the hospital encounter of 01/11/23   CBC with Auto Differential   Result Value Ref Range    WBC 14.1 (H) 4.0 - 10.5 K/CU MM    RBC 4.75 4.2 - 5.4 M/CU MM    Hemoglobin 15.1 12.5 - 16.0 GM/DL    Hematocrit 44.5 37 - 47 %    MCV 93.7 78 - 100 FL    MCH 31.8 (H) 27 - 31 PG    MCHC 33.9 32.0 - 36.0 %    RDW 12.2 11.7 - 14.9 %    Platelets 625 788 - 149 K/CU MM    MPV 11.6 (H) 7.5 - 11.1 FL    Differential Type AUTOMATED DIFFERENTIAL     Segs Relative 79.7 (H) 36 - 66 %    Lymphocytes % 15.0 (L) 24 - 44 %    Monocytes % 4.1 (H) 0 - 4 %    Eosinophils % 0.3 0 - 3 %    Basophils % 0.5 0 - 1 %    Segs Absolute 11.3 K/CU MM    Lymphocytes Absolute 2.1 K/CU MM    Monocytes Absolute 0.6 K/CU MM    Eosinophils Absolute 0.0 K/CU MM    Basophils Absolute 0.1 K/CU MM    Nucleated RBC % 0.0 %    Total Nucleated RBC 0.0 K/CU MM    Total Immature Neutrophil 0.06 K/CU MM    Immature Neutrophil % 0.4 0 - 0.43 %   Comprehensive Metabolic Panel   Result Value Ref Range    Sodium 138 135 - 145 MMOL/L    Potassium 4.1 3.5 - 5.1 MMOL/L    Chloride 103 99 - 110 mMol/L    CO2 21 21 - 32 MMOL/L    BUN 19 6 - 23 MG/DL    Creatinine 0.8 0.6 - 1.1 MG/DL    Est, Glom Filt Rate >60 >60 mL/min/1.73m2    Glucose 112 (H) 70 - 99 MG/DL    Calcium 10.2 8.3 - 10.6 MG/DL    Albumin 4.8 3.4 - 5.0 GM/DL    Total Protein 8.6 (H) 6.4 - 8.2 GM/DL    Total Bilirubin 0.5 0.0 - 1.0 MG/DL    ALT 11 10 - 40 U/L    AST 14 (L) 15 - 37 IU/L    Alkaline Phosphatase 72 40 - 129 IU/L    Anion Gap 14 4 - 16   Lipase   Result Value Ref Range    Lipase 26 13 - 60 IU/L   Urinalysis   Result Value Ref Range    Color, UA YELLOW YELLOW    Clarity, UA CLEAR CLEAR    Glucose, Urine NEGATIVE NEGATIVE MG/DL    Bilirubin Urine NEGATIVE NEGATIVE MG/DL    Ketones, Urine 15 (A) NEGATIVE MG/DL    Specific Gravity, UA 1.010 1.001 - 1.035    Blood, Urine LARGE NUMBER OR AMOUNT OBSERVED (A) NEGATIVE    pH, Urine 8.5 (HH) 5.0 - 8.0    Protein, UA 30 (A) NEGATIVE MG/DL    Urobilinogen, Urine 0.2 0.2 - 1.0 MG/DL    Nitrite Urine, Quantitative NEGATIVE NEGATIVE    Leukocyte Esterase, Urine TRACE (A) NEGATIVE   HCG Serum, Quantitative   Result Value Ref Range    hCG Quant 2.8 uIU/ML   Microscopic Urinalysis   Result Value Ref Range    RBC, UA 93 (H) 0 - 6 /HPF    WBC, UA 5 0 - 5 /HPF    Bacteria, UA RARE (A) NEGATIVE /HPF    Squam Epithel, UA 3 /HPF    Mucus, UA RARE (A) NEGATIVE HPF    Trichomonas, UA NONE SEEN NONE SEEN /HPF      Radiographs (if obtained):  Radiologist's Report Reviewed:  CT ABDOMEN PELVIS WO CONTRAST Additional Contrast? None    Result Date: 1/11/2023  EXAMINATION: CT OF THE ABDOMEN AND PELVIS WITHOUT CONTRAST 1/11/2023 6:36 pm TECHNIQUE: CT of the abdomen and pelvis was performed without the administration of intravenous contrast. Multiplanar reformatted images are provided for review. Automated exposure control, iterative reconstruction, and/or weight based adjustment of the mA/kV was utilized to reduce the radiation dose to as low as reasonably achievable. COMPARISON: CT abdomen and pelvis 06/01/2019. HISTORY: ORDERING SYSTEM PROVIDED HISTORY: left flank pain hx of kidney stones, nausea, and vomiting TECHNOLOGIST PROVIDED HISTORY: Reason for exam:->left flank pain hx of kidney stones, nausea, and vomiting Additional Contrast?->None Decision Support Exception - unselect if not a suspected or confirmed emergency medical condition->Emergency Medical Condition (MA) Is the patient pregnant?->No Reason for Exam: left flank pain hx of kidney stones, nausea, and vomiting - Additional signs and symptoms: no Relevant Medical/Surgical History: none FINDINGS: Lower Chest: Calcified granuloma in the left lower lobe.   Images through the lung bases demonstrate no acute process. Organs: Lack of intravenous contrast limits evaluation of the solid organs, vascular structures, and bowel. The liver and gallbladder are unremarkable. No biliary ductal dilatation is identified. The pancreas, spleen, and bilateral adrenal glands are unremarkable. Small bilateral nonobstructing renal calculi measuring up to 3 mm. 2 mm stone in the proximal left ureter with mild hydronephrosis. GI/Bowel: Status post appendectomy. Anastomotic sutures in the sigmoid colon. The stomach and small bowel are normal in appearance. No obstruction or wall thickening identified. Pelvis: The urinary bladder is normal in appearance. The uterus and bilateral adnexae are unremarkable. No free fluid in the pelvis. No pelvic or inguinal lymphadenopathy. Peritoneum/Retroperitoneum: The abdominal aorta is normal in appearance. No retroperitoneal or mesenteric lymphadenopathy is identified. No free air or fluid is seen in the abdomen. Bones/Soft Tissues: No acute osseous or soft tissue abnormality. 1. 2 mm stone in the proximal left ureter with mild hydronephrosis. 2. Small bilateral nonobstructing renal calculi. EKG (if obtained): (All EKG's are interpreted by myself in the absence of a cardiologist)    Medications   ondansetron (ZOFRAN) injection 4 mg (4 mg IntraVENous Given 1/11/23 1807)   morphine sulfate (PF) injection 4 mg (has no administration in time range)   morphine sulfate (PF) injection 4 mg (4 mg IntraVENous Given 1/11/23 1807)   0.9 % sodium chloride bolus (1,000 mLs IntraVENous New Bag 1/11/23 1807)   ketorolac (TORADOL) injection 30 mg (30 mg IntraVENous Given 1/11/23 1806)         MDM:  Patient is a 59-year-old female with history of kidney stones who presents to the emergency department for left flank and left abdominal pain sudden onset that started today. Patient gives a history. Patient appears uncomfortable writhing in pain.   Patient states sudden onset of left flank pain radiating to the abdomen with nausea vomiting and diarrhea. Patient has history of kidney stone feels similar. Patient also states she has had some diverticulitis on the left side in the past as well. Patient actively writhing in pain and dry heaving during my evaluation. Differential diagnosis does include kidney stones, pyelonephritis, intra-abdominal abscess, diverticulitis and colitis. Patient was ordered morphine 4 mg IV, 1 L bolus of normal saline IV fluids, Toradol 30 mg IV, Zofran 4 mg IV. Laboratory studies were ordered which patient does have mildly elevated white count 14.1. Patient normal hemoglobin and platelets. Urinalysis shows blood 93 red blood cells rare bacteria rare mucus. Patient normal sodium potassium kidney function calcium normal liver enzymes. Patient glucose 112. Patient with normal lipase. Pregnancy screen negative. CT scan does show 2 mm stone proximal left ureter with mild hydronephrosis, small bilateral nonobstructing renal calculi. Patient updated on laboratory imaging study findings. Patient states her pain has improved but starting come back. Patient was ordered more morphine 4 mg IV. Patient with history of kidney stones does see urology Dr. Angeline Reynolds  Discussed with patient I can place her on Norco 5/325 mg p.o. every 6 hours as needed pain, ibuprofen 600 mg p.o. every 6 hours as needed for pain and Zofran 4 mg p.o. as needed for nausea. Patient follow-up with her urologist Dr. Angeline Reynolds in 1 to 2 days for reevaluation. Patient to increase p.o. fluids to prevent dehydration and to flush kidney stone. Patient is return immediately for any worsening symptoms. All questions answered. Clinical Impression:  1. Ureterolithiasis    2.  Hydronephrosis with urinary obstruction due to ureteral calculus      Disposition referral (if applicable):  Keerthi Gonzalez MD  [address] [place] [address]  [phone]    Schedule an appointment as soon as possible for a visit in 1 day  If symptoms worsen and re-evaluation. call for an [address] [facility], [address] [place], [place] [address]  [phone]    Schedule an appointment as soon as possible for a visit   As needed    [facility] Emergency Department  [address]  [phone]  Go in 1 day  If symptoms worsen    Disposition medications (if applicable):  New Prescriptions    HYDROCODONE-ACETAMINOPHEN (NORCO) 5-325 MG PER TABLET    Take 1 tablet by mouth every 6 hours as needed for Pain for up to 3 days. Intended supply: 3 days. Take lowest dose possible to manage pain Max Daily Amount: 4 tablets    IBUPROFEN (ADVIL;MOTRIN) 600 MG TABLET    Take 1 tablet by mouth 3 times daily as needed for Pain    ONDANSETRON (ZOFRAN-ODT) 4 MG DISINTEGRATING TABLET    Take 1 tablet by mouth 3 times daily as needed for Nausea or Vomiting     ED Provider Disposition Time  DISPOSITION Decision To Discharge 01/11/2023 07:17:51 PM      Comment: Please note this report has been produced using speech recognition software and may contain errors related to that system including errors in grammar, punctuation, and spelling, as well as words and phrases that may be inappropriate. Efforts were made to edit the dictations.        Paige Pedroza DO  01/11/23 3944

## 2023-01-12 NOTE — ED NOTES
Pt DC'd to home with help of . Condition stable on DC. Verbalized understanding of instructions for follow up and  of Rx.       Horace Abdi RN  01/11/23 0857

## 2023-01-12 NOTE — DISCHARGE INSTRUCTIONS
Follow-up with your urologist Dr. Nguyen Blankenship in 1 to 2 days for reevaluation of kidney stone. Call for an appointment  Take Derek Baldwin as needed for pain. No drink or drive while taking  Take Zofran as needed for nausea and vomiting  Take ibuprofen as prescribed for inflammation pain and swelling  Return to the emergency department immediate increased pain fever chills nausea vomiting dizzy lightheadedness or any worsening symptoms.

## 2023-08-08 LAB — MAMMOGRAPHY, EXTERNAL: NORMAL

## 2024-04-24 NOTE — TELEPHONE ENCOUNTER
Faxed order for Pet with notes & insurance info to [facility] after prior authorization is obtained facility will contact pt We can change to less powerful med lovastatin 10 ok 90 and get the scan ordered

## 2024-06-21 ENCOUNTER — APPOINTMENT (OUTPATIENT)
Dept: GENERAL RADIOLOGY | Age: 49
DRG: 390 | End: 2024-06-21
Payer: COMMERCIAL

## 2024-06-21 ENCOUNTER — APPOINTMENT (OUTPATIENT)
Dept: CT IMAGING | Age: 49
DRG: 390 | End: 2024-06-21
Payer: COMMERCIAL

## 2024-06-21 ENCOUNTER — HOSPITAL ENCOUNTER (INPATIENT)
Age: 49
LOS: 5 days | Discharge: HOME | DRG: 390 | End: 2024-06-26
Attending: STUDENT IN AN ORGANIZED HEALTH CARE EDUCATION/TRAINING PROGRAM | Admitting: STUDENT IN AN ORGANIZED HEALTH CARE EDUCATION/TRAINING PROGRAM
Payer: COMMERCIAL

## 2024-06-21 DIAGNOSIS — R10.32 ABDOMINAL PAIN, LEFT LOWER QUADRANT: Primary | ICD-10-CM

## 2024-06-21 DIAGNOSIS — R11.2 NAUSEA AND VOMITING, UNSPECIFIED VOMITING TYPE: ICD-10-CM

## 2024-06-21 PROBLEM — K56.609 SBO (SMALL BOWEL OBSTRUCTION) (HCC): Status: ACTIVE | Noted: 2024-06-21

## 2024-06-21 LAB
ALBUMIN SERPL-MCNC: 4.5 GM/DL (ref 3.4–5)
ALP BLD-CCNC: 57 IU/L (ref 40–128)
ALT SERPL-CCNC: 25 U/L (ref 10–40)
ANION GAP SERPL CALCULATED.3IONS-SCNC: 11 MMOL/L (ref 7–16)
AST SERPL-CCNC: 26 IU/L (ref 15–37)
BASOPHILS ABSOLUTE: 0 K/CU MM
BASOPHILS RELATIVE PERCENT: 0.3 % (ref 0–1)
BILIRUB SERPL-MCNC: 0.3 MG/DL (ref 0–1)
BILIRUBIN, URINE: NEGATIVE MG/DL
BLOOD, URINE: NEGATIVE
BUN SERPL-MCNC: 18 MG/DL (ref 6–23)
CALCIUM SERPL-MCNC: 9.6 MG/DL (ref 8.3–10.6)
CHLORIDE BLD-SCNC: 103 MMOL/L (ref 99–110)
CLARITY, UA: CLEAR
CO2: 26 MMOL/L (ref 21–32)
COLOR, UA: YELLOW
COMMENT UA: NORMAL
CREAT SERPL-MCNC: 0.6 MG/DL (ref 0.6–1.1)
DIFFERENTIAL TYPE: ABNORMAL
EOSINOPHILS ABSOLUTE: 0 K/CU MM
EOSINOPHILS RELATIVE PERCENT: 0.3 % (ref 0–3)
EPITHELIAL CELLS, UA: 1 /HPF
GFR, ESTIMATED: >90 ML/MIN/1.73M2
GLUCOSE SERPL-MCNC: 119 MG/DL (ref 70–99)
GLUCOSE URINE: NEGATIVE MG/DL
HCT VFR BLD CALC: 41.3 % (ref 37–47)
HEMOGLOBIN: 13.8 GM/DL (ref 12.5–16)
IMMATURE NEUTROPHIL %: 0.3 % (ref 0–0.43)
KETONES, URINE: NEGATIVE MG/DL
LEUKOCYTE ESTERASE, URINE: ABNORMAL
LIPASE: 24 IU/L (ref 13–60)
LYMPHOCYTES ABSOLUTE: 1 K/CU MM
LYMPHOCYTES RELATIVE PERCENT: 15.1 % (ref 24–44)
MAGNESIUM: 2.3 MG/DL (ref 1.8–2.4)
MCH RBC QN AUTO: 32.9 PG (ref 27–31)
MCHC RBC AUTO-ENTMCNC: 33.4 % (ref 32–36)
MCV RBC AUTO: 98.6 FL (ref 78–100)
MONOCYTES ABSOLUTE: 0.2 K/CU MM
MONOCYTES RELATIVE PERCENT: 3.4 % (ref 0–4)
NEUTROPHILS ABSOLUTE: 5.2 K/CU MM
NEUTROPHILS RELATIVE PERCENT: 80.6 % (ref 36–66)
NITRITE URINE, QUANTITATIVE: NEGATIVE
NUCLEATED RBC %: 0 %
PDW BLD-RTO: 12.9 % (ref 11.7–14.9)
PH, URINE: 7.5 (ref 5–8)
PLATELET # BLD: 187 K/CU MM (ref 140–440)
PMV BLD AUTO: 12.3 FL (ref 7.5–11.1)
POTASSIUM SERPL-SCNC: 4.1 MMOL/L (ref 3.5–5.1)
PREGNANCY, SERUM: NEGATIVE
PROTEIN UA: NEGATIVE MG/DL
RBC # BLD: 4.19 M/CU MM (ref 4.2–5.4)
SODIUM BLD-SCNC: 140 MMOL/L (ref 135–145)
SPECIFIC GRAVITY UA: 1.01 (ref 1–1.03)
TOTAL IMMATURE NEUTOROPHIL: 0.02 K/CU MM
TOTAL NUCLEATED RBC: 0 K/CU MM
TOTAL PROTEIN: 7.7 GM/DL (ref 6.4–8.2)
UROBILINOGEN, URINE: 0.2 MG/DL (ref 0.2–1)
WBC # BLD: 6.5 K/CU MM (ref 4–10.5)
WBC UA: 3 /HPF (ref 0–5)

## 2024-06-21 PROCEDURE — 6360000002 HC RX W HCPCS: Performed by: NURSE PRACTITIONER

## 2024-06-21 PROCEDURE — 83690 ASSAY OF LIPASE: CPT

## 2024-06-21 PROCEDURE — 2580000003 HC RX 258: Performed by: STUDENT IN AN ORGANIZED HEALTH CARE EDUCATION/TRAINING PROGRAM

## 2024-06-21 PROCEDURE — 6360000002 HC RX W HCPCS: Performed by: STUDENT IN AN ORGANIZED HEALTH CARE EDUCATION/TRAINING PROGRAM

## 2024-06-21 PROCEDURE — 96375 TX/PRO/DX INJ NEW DRUG ADDON: CPT

## 2024-06-21 PROCEDURE — 84703 CHORIONIC GONADOTROPIN ASSAY: CPT

## 2024-06-21 PROCEDURE — 85025 COMPLETE CBC W/AUTO DIFF WBC: CPT

## 2024-06-21 PROCEDURE — 93005 ELECTROCARDIOGRAM TRACING: CPT | Performed by: STUDENT IN AN ORGANIZED HEALTH CARE EDUCATION/TRAINING PROGRAM

## 2024-06-21 PROCEDURE — 80053 COMPREHEN METABOLIC PANEL: CPT

## 2024-06-21 PROCEDURE — 6370000000 HC RX 637 (ALT 250 FOR IP): Performed by: STUDENT IN AN ORGANIZED HEALTH CARE EDUCATION/TRAINING PROGRAM

## 2024-06-21 PROCEDURE — 81001 URINALYSIS AUTO W/SCOPE: CPT

## 2024-06-21 PROCEDURE — 74177 CT ABD & PELVIS W/CONTRAST: CPT

## 2024-06-21 PROCEDURE — 99285 EMERGENCY DEPT VISIT HI MDM: CPT

## 2024-06-21 PROCEDURE — 1200000000 HC SEMI PRIVATE

## 2024-06-21 PROCEDURE — 83735 ASSAY OF MAGNESIUM: CPT

## 2024-06-21 PROCEDURE — 96374 THER/PROPH/DIAG INJ IV PUSH: CPT

## 2024-06-21 PROCEDURE — 71045 X-RAY EXAM CHEST 1 VIEW: CPT

## 2024-06-21 PROCEDURE — 6360000004 HC RX CONTRAST MEDICATION: Performed by: STUDENT IN AN ORGANIZED HEALTH CARE EDUCATION/TRAINING PROGRAM

## 2024-06-21 RX ORDER — ACETAMINOPHEN 325 MG/1
650 TABLET ORAL EVERY 6 HOURS PRN
Status: DISCONTINUED | OUTPATIENT
Start: 2024-06-21 | End: 2024-06-21 | Stop reason: SDUPTHER

## 2024-06-21 RX ORDER — ACETAMINOPHEN 325 MG/1
650 TABLET ORAL EVERY 4 HOURS PRN
Status: DISCONTINUED | OUTPATIENT
Start: 2024-06-21 | End: 2024-06-26 | Stop reason: HOSPADM

## 2024-06-21 RX ORDER — KETOROLAC TROMETHAMINE 30 MG/ML
15 INJECTION, SOLUTION INTRAMUSCULAR; INTRAVENOUS EVERY 6 HOURS PRN
Status: DISCONTINUED | OUTPATIENT
Start: 2024-06-21 | End: 2024-06-26 | Stop reason: HOSPADM

## 2024-06-21 RX ORDER — SODIUM CHLORIDE 9 MG/ML
INJECTION, SOLUTION INTRAVENOUS PRN
Status: DISCONTINUED | OUTPATIENT
Start: 2024-06-21 | End: 2024-06-26 | Stop reason: HOSPADM

## 2024-06-21 RX ORDER — FENTANYL CITRATE 50 UG/ML
65 INJECTION, SOLUTION INTRAMUSCULAR; INTRAVENOUS ONCE
Status: COMPLETED | OUTPATIENT
Start: 2024-06-21 | End: 2024-06-21

## 2024-06-21 RX ORDER — SODIUM CHLORIDE, SODIUM LACTATE, POTASSIUM CHLORIDE, CALCIUM CHLORIDE 600; 310; 30; 20 MG/100ML; MG/100ML; MG/100ML; MG/100ML
INJECTION, SOLUTION INTRAVENOUS CONTINUOUS
Status: DISCONTINUED | OUTPATIENT
Start: 2024-06-21 | End: 2024-06-22

## 2024-06-21 RX ORDER — SODIUM CHLORIDE 0.9 % (FLUSH) 0.9 %
5-40 SYRINGE (ML) INJECTION PRN
Status: DISCONTINUED | OUTPATIENT
Start: 2024-06-21 | End: 2024-06-26 | Stop reason: HOSPADM

## 2024-06-21 RX ORDER — 0.9 % SODIUM CHLORIDE 0.9 %
1000 INTRAVENOUS SOLUTION INTRAVENOUS ONCE
Status: COMPLETED | OUTPATIENT
Start: 2024-06-21 | End: 2024-06-21

## 2024-06-21 RX ORDER — ONDANSETRON 4 MG/1
4 TABLET, ORALLY DISINTEGRATING ORAL EVERY 8 HOURS PRN
Status: DISCONTINUED | OUTPATIENT
Start: 2024-06-21 | End: 2024-06-26 | Stop reason: HOSPADM

## 2024-06-21 RX ORDER — ONDANSETRON 2 MG/ML
4 INJECTION INTRAMUSCULAR; INTRAVENOUS EVERY 6 HOURS PRN
Status: DISCONTINUED | OUTPATIENT
Start: 2024-06-21 | End: 2024-06-26 | Stop reason: HOSPADM

## 2024-06-21 RX ORDER — ONDANSETRON 2 MG/ML
4 INJECTION INTRAMUSCULAR; INTRAVENOUS ONCE
Status: COMPLETED | OUTPATIENT
Start: 2024-06-21 | End: 2024-06-21

## 2024-06-21 RX ORDER — ACETAMINOPHEN 500 MG
1000 TABLET ORAL ONCE
Status: DISCONTINUED | OUTPATIENT
Start: 2024-06-21 | End: 2024-06-21

## 2024-06-21 RX ORDER — ACETAMINOPHEN 650 MG/1
650 SUPPOSITORY RECTAL EVERY 6 HOURS PRN
Status: DISCONTINUED | OUTPATIENT
Start: 2024-06-21 | End: 2024-06-21 | Stop reason: SDUPTHER

## 2024-06-21 RX ORDER — POTASSIUM CHLORIDE 20 MEQ/1
40 TABLET, EXTENDED RELEASE ORAL PRN
Status: DISCONTINUED | OUTPATIENT
Start: 2024-06-21 | End: 2024-06-26 | Stop reason: HOSPADM

## 2024-06-21 RX ORDER — MORPHINE SULFATE 2 MG/ML
2 INJECTION, SOLUTION INTRAMUSCULAR; INTRAVENOUS ONCE
Status: COMPLETED | OUTPATIENT
Start: 2024-06-21 | End: 2024-06-21

## 2024-06-21 RX ORDER — POTASSIUM CHLORIDE 7.45 MG/ML
10 INJECTION INTRAVENOUS PRN
Status: DISCONTINUED | OUTPATIENT
Start: 2024-06-21 | End: 2024-06-26 | Stop reason: HOSPADM

## 2024-06-21 RX ORDER — MORPHINE SULFATE 4 MG/ML
4 INJECTION, SOLUTION INTRAMUSCULAR; INTRAVENOUS ONCE
Status: COMPLETED | OUTPATIENT
Start: 2024-06-21 | End: 2024-06-21

## 2024-06-21 RX ORDER — PROCHLORPERAZINE EDISYLATE 5 MG/ML
5 INJECTION INTRAMUSCULAR; INTRAVENOUS ONCE
Status: COMPLETED | OUTPATIENT
Start: 2024-06-21 | End: 2024-06-21

## 2024-06-21 RX ORDER — POLYETHYLENE GLYCOL 3350 17 G/17G
17 POWDER, FOR SOLUTION ORAL DAILY PRN
Status: DISCONTINUED | OUTPATIENT
Start: 2024-06-21 | End: 2024-06-24

## 2024-06-21 RX ORDER — SODIUM CHLORIDE 0.9 % (FLUSH) 0.9 %
5-40 SYRINGE (ML) INJECTION EVERY 12 HOURS SCHEDULED
Status: DISCONTINUED | OUTPATIENT
Start: 2024-06-21 | End: 2024-06-26 | Stop reason: HOSPADM

## 2024-06-21 RX ORDER — MAGNESIUM SULFATE IN WATER 40 MG/ML
2000 INJECTION, SOLUTION INTRAVENOUS PRN
Status: DISCONTINUED | OUTPATIENT
Start: 2024-06-21 | End: 2024-06-26 | Stop reason: HOSPADM

## 2024-06-21 RX ADMIN — SODIUM CHLORIDE, POTASSIUM CHLORIDE, SODIUM LACTATE AND CALCIUM CHLORIDE: 600; 310; 30; 20 INJECTION, SOLUTION INTRAVENOUS at 18:30

## 2024-06-21 RX ADMIN — ONDANSETRON 4 MG: 2 INJECTION INTRAMUSCULAR; INTRAVENOUS at 18:05

## 2024-06-21 RX ADMIN — SODIUM CHLORIDE 1000 ML: 9 INJECTION, SOLUTION INTRAVENOUS at 13:09

## 2024-06-21 RX ADMIN — FENTANYL CITRATE 65 MCG: 50 INJECTION INTRAMUSCULAR; INTRAVENOUS at 13:06

## 2024-06-21 RX ADMIN — IOPAMIDOL 80 ML: 755 INJECTION, SOLUTION INTRAVENOUS at 13:01

## 2024-06-21 RX ADMIN — PROCHLORPERAZINE EDISYLATE 5 MG: 5 INJECTION INTRAMUSCULAR; INTRAVENOUS at 21:45

## 2024-06-21 RX ADMIN — KETOROLAC TROMETHAMINE 15 MG: 30 INJECTION, SOLUTION INTRAMUSCULAR; INTRAVENOUS at 18:05

## 2024-06-21 RX ADMIN — MORPHINE SULFATE 2 MG: 2 INJECTION, SOLUTION INTRAMUSCULAR; INTRAVENOUS at 21:47

## 2024-06-21 RX ADMIN — SODIUM CHLORIDE, PRESERVATIVE FREE 10 ML: 5 INJECTION INTRAVENOUS at 21:45

## 2024-06-21 RX ADMIN — MORPHINE SULFATE 4 MG: 4 INJECTION, SOLUTION INTRAMUSCULAR; INTRAVENOUS at 14:00

## 2024-06-21 RX ADMIN — MAJOR MAGNESIUM CITRATE ORAL SOLUTION - LEMON 296 ML: 1.75 LIQUID ORAL at 18:12

## 2024-06-21 RX ADMIN — ONDANSETRON 4 MG: 2 INJECTION INTRAMUSCULAR; INTRAVENOUS at 13:06

## 2024-06-21 ASSESSMENT — PAIN - FUNCTIONAL ASSESSMENT
PAIN_FUNCTIONAL_ASSESSMENT: ACTIVITIES ARE NOT PREVENTED
PAIN_FUNCTIONAL_ASSESSMENT: 0-10
PAIN_FUNCTIONAL_ASSESSMENT: 0-10
PAIN_FUNCTIONAL_ASSESSMENT: PREVENTS OR INTERFERES SOME ACTIVE ACTIVITIES AND ADLS

## 2024-06-21 ASSESSMENT — PAIN SCALES - GENERAL
PAINLEVEL_OUTOF10: 8
PAINLEVEL_OUTOF10: 2
PAINLEVEL_OUTOF10: 8

## 2024-06-21 ASSESSMENT — PAIN DESCRIPTION - PAIN TYPE
TYPE: ACUTE PAIN
TYPE: ACUTE PAIN

## 2024-06-21 ASSESSMENT — PAIN DESCRIPTION - LOCATION
LOCATION: ABDOMEN

## 2024-06-21 ASSESSMENT — PAIN DESCRIPTION - ORIENTATION
ORIENTATION: MID;LOWER
ORIENTATION: MID;LOWER
ORIENTATION: RIGHT;LEFT;UPPER
ORIENTATION: LEFT;INNER;ANTERIOR

## 2024-06-21 ASSESSMENT — PAIN DESCRIPTION - DESCRIPTORS
DESCRIPTORS: SHARP
DESCRIPTORS: SHARP;STABBING
DESCRIPTORS: ACHING;SHARP
DESCRIPTORS: SHARP;PRESSURE

## 2024-06-21 ASSESSMENT — PAIN DESCRIPTION - ONSET
ONSET: ON-GOING
ONSET: ON-GOING

## 2024-06-21 ASSESSMENT — PAIN DESCRIPTION - FREQUENCY
FREQUENCY: INTERMITTENT
FREQUENCY: INTERMITTENT

## 2024-06-21 NOTE — CONSULTS
Patient well-known to me.  Consult to be dictated.  Possible distal SBO versus constipation secondary to dehydration.  Follow serial abdominal exams.

## 2024-06-21 NOTE — ED NOTES
Medical History:   Diagnosis Date    Acute esophagitis 11/21/2017    Adnexal cyst 10/24/2017    Allergic rhinitis 8/22/2019    Chest pain 09/25/2017    \"a couple of yrs ago had some chest pain- did echo and everything was fine\"    Generalized anxiety disorder 8/22/2019    History of kidney stones     \"3 or 4 yrs ago- tx with Lithotripsy- had to do two in a row\"/10/2017\"Pet scan did show I have 2 small stones now\"    Kidney stone 8/22/2019    Lung infiltrate on CT 9/25/2017    Lung nodule seen on imaging study 10/24/2017    scheduled for lung bx 10/25/2017\"\"in [facility] had side flank pain- thought kidney stone- had CT scan done 9/2017-showed shadow on left lower lung- sent to Dr Correa- did CT of chest showed nodule in both lungs- multiple on left an one right side\"    Prolonged emergence from general anesthesia     \"do have trouble waking up after surgery\"    Temporomandibular joint tender 8/22/2019       Assessment    Vitals: MEWS Score: 2  Level of Consciousness: Alert (0)   Vitals:    06/21/24 1408 06/21/24 1411 06/21/24 1510 06/21/24 1640   BP:  118/61 (!) 96/59 (!) 107/56   Pulse: (!) 47 (!) 46 50 (!) 41   Resp:   16    Temp:       TempSrc:       SpO2: 100% 100% 100% 91%   Weight:       Height:         PO Status: Regular  O2 Flow Rate: O2 Device: None (Room air)    Cardiac Rhythm: SB  Last documented pain medication administered: 1400  NIH Score: NIH     Active LDA's:   Peripheral IV 06/21/24 Right Antecubital (Active)   Site Assessment Clean, dry & intact 06/21/24 1131   Line Status Blood return noted;Flushed;Normal saline locked 06/21/24 1131   Line Care Connections checked and tightened 06/21/24 1131   Phlebitis Assessment No symptoms 06/21/24 1131   Infiltration Assessment 0 06/21/24 1131   Dressing Status New dressing applied;Clean, dry & intact 06/21/24 1131   Dressing Type Transparent 06/21/24 1131   Dressing Intervention New 06/21/24 1131       Pertinent or High Risk Medications/Drips: no   If Yes,  please provide details:   Blood Product Administration: no  If Yes, please provide details:     Recommendation    Incomplete orders   Additional Comments:    If any further questions, please call Sending RN at [phone]    Electronically signed by: Electronically signed by Gita John RN on 6/21/2024 at 5:05 PM

## 2024-06-21 NOTE — PROGRESS NOTES
4 Eyes Skin Assessment     NAME:  Rut Coronado  YOB: 1975  MEDICAL RECORD NUMBER:  [id number]    The patient is being assessed for  Admission    I agree that at least one RN has performed a thorough Head to Toe Skin Assessment on the patient. ALL assessment sites listed below have been assessed.      Areas assessed by both nurses:    Head, Face, Ears, Shoulders, Back, Chest, Arms, Elbows, Hands, Sacrum. Buttock, Coccyx, Ischium, Legs. Feet and Heels, and Under Medical Devices         Does the Patient have a Wound? No noted wound(s)       Orlin Prevention initiated by RN: No  Wound Care Orders initiated by RN: No    Pressure Injury (Stage 3,4, Unstageable, DTI, NWPT, and Complex wounds) if present, place Wound referral order by RN under : No    New Ostomies, if present place, Ostomy referral order under : No     Nurse 1 eSignature: Electronically signed by Virginia Naranjo RN on 6/21/24 at 6:55 PM EDT    **SHARE this note so that the co-signing nurse can place an eSignature**    Nurse 2 eSignature: Electronically signed by Luis Martinez LPN on 6/21/24 at 7:10 PM EDT

## 2024-06-21 NOTE — ED PROVIDER NOTES
left lower lung- sent to Dr Correa- did CT of chest showed nodule in both lungs- multiple on left an one right side\"    Prolonged emergence from general anesthesia     \"do have trouble waking up after surgery\"    Temporomandibular joint tender 8/22/2019     Patient Active Problem List   Diagnosis Code    Lung infiltrate on CT R91.8    Chest pain R07.9    Lung nodule seen on imaging study R91.1    Adnexal cyst N94.9    Acute esophagitis K20.90    Perforated diverticulum of large intestine K57.20    Temporomandibular joint tender M26.629    Allergic rhinitis J30.9    Generalized anxiety disorder F41.1    Kidney stone N20.0    Hydronephrosis with ureteral calculus N13.2    SBO (small bowel obstruction) ([facility]) K56.609     Family History   Problem Relation Age of Onset    Stroke Father         TIA    High Blood Pressure Father        Current Facility-Administered Medications:     metoclopramide (REGLAN) injection 10 mg, 10 mg, IntraVENous, Q6H, Charline Horn MD, 10 mg at 06/22/24 1144    pantoprazole (PROTONIX) injection 40 mg, 40 mg, IntraVENous, Daily, Charline Horn MD, 40 mg at 06/22/24 1144    0.9 % sodium chloride infusion, , IntraVENous, Continuous, Davion Mario MD, Last Rate: 150 mL/hr at 06/22/24 1158, New Bag at 06/22/24 1158    promethazine (PHENERGAN) tablet 25 mg, 25 mg, Oral, Q6H PRN, Olegario Castro MD, 25 mg at 06/22/24 1501    sodium chloride flush 0.9 % injection 5-40 mL, 5-40 mL, IntraVENous, 2 times per day, Davion Mario MD    sodium chloride flush 0.9 % injection 5-40 mL, 5-40 mL, IntraVENous, PRN, Davion Mario MD, 10 mL at 06/22/24 0029    0.9 % sodium chloride infusion, , IntraVENous, PRN, Davion Mario MD    potassium chloride (KLOR-CON M) extended release tablet 40 mEq, 40 mEq, Oral, PRN **OR** potassium bicarb-citric acid (EFFER-K) effervescent tablet 40 mEq, 40 mEq, Oral, PRN **OR** potassium chloride 10 mEq/100 mL IVPB (Peripheral Line), 10 mEq, IntraVENous,  Disposition Time  DISPOSITION Admitted 06/21/2024 04:46:22 PM            Mendel Gleason MD  06/22/24 0725

## 2024-06-21 NOTE — H&P
V2.0  History and Physical      Name:  Rut Coronado /Age/Sex: 1975  (49 y.o. female)   MRN & CSN:  [id number] & [id number] Encounter Date/Time: 2024 5:25 PM EDT   Location:  ED27/ED-27 PCP: Rashaad Gaxiola MD       Hospital Day: 1    Assessment and Plan:   Rut Coronado is a 49 y.o. female who presents with SBO (small bowel obstruction) ([facility])    Hospital Problems             Last Modified POA    * (Principal) SBO (small bowel obstruction) ([facility]) 2024 Yes       Plan:    Abdominal pain secondary to SBO versus severe constipation.  -Suspect dehydration as a trigger.  Laboratory studies unremarkable without any evidence of leukocytosis.  Abdominal exam without any rebound or guarding.  -Started on IV normal saline.  Toradol for pain control.  General surgery consulted in the ER.  They recommended mag citrate which has been ordered.  Full liquid diet for now.  Advance as per general surgery    Bradycardia  -Suspect physiologic given she is an avid walker and walks 10 to 15 miles a day.  Asymptomatic.  Rhythm is sinus.  No prolonged QT or OR interval noted.  Low threshold for cardiology consultation if starts to have symptoms.    Disposition:   Current Living situation: Home  Expected Disposition: Home  Estimated D/C: 1 to 2 days    Diet Clear liquids   DVT Prophylaxis [] Lovenox, []  Heparin, [] SCDs, [x] Ambulation,  [] Eliquis, [] Xarelto, [] Coumadin   Code Status Full code   Surrogate Decision Maker/ POA Spouse     Omar Coronado (Spouse)        Personally reviewed Lab Studies and Imaging     Discussed management of the case with ED provider who recommended admission and agree    EKG interpreted personally and reveals sinus rhythm.  No prolonged QT or OR interval noted.    Imaging that was interpreted personally includes CT abdomen and pelvis and results severe constipation    Drugs that require monitoring for toxicity include IV Toradol and the method of monitoring was  probably within the pelvis normal appendix. LYMPH NODES: No abnormally enlarged nodes. PERITONEUM/RETROPERITONEUM: No ascites or free air. VESSELS: Aorta is normal caliber and proximal branch vessels are patent.  The inferior vena cava, hepatic veins and portal venous system are patent. ABDOMINAL WALL: Normal. BONES: No destructive process. IMPRESSION: 1. Findings suggest distal small bowel obstruction.. Electronically signed by Braulio Lazar        Electronically signed by Davion Mario MD on 6/21/2024 at 5:27 PM

## 2024-06-21 NOTE — ED PROVIDER NOTES
ED Course as of 06/21/24 1637   Fri Jun 21, 2024   1220 CBC unremarkable  CMP unremarkable  Lipase pregnancy magnesium unremarkable [CR]   1343 UA shows 3 WBCs 1 epithelial cell [CR]   1502 Hx of perf diverticulum   Saw Dr. Horn, covered by Alirio  Lower abdomen, worse on the left  Pend: Imaging   Dispo: follow up if Ct is ok [AR]   1606 XR CHEST PORTABLE  IMPRESSION:  No acute findings in the chest     [AR]   1607 CT ABDOMEN PELVIS W IV CONTRAST Additional Contrast? None  IMPRESSION:     1. Findings suggest distal small bowel obstruction..   [AR]   1616 Reevaluation patient reports that her nausea and pain are well-controlled at the time.  General surgery consult placed [AR]   1634 Dr. Wei, general surgery.  He recommends no acute intervention, but he does recommend admission for observation and mag citrate. [AR]      ED Course User Index  [AR] Kenneth Pierson MD  [CR] Mendel Gleason MD      Dispo: admit     Kenneth Pierson MD  06/21/24 8601

## 2024-06-22 ENCOUNTER — APPOINTMENT (OUTPATIENT)
Dept: GENERAL RADIOLOGY | Age: 49
DRG: 390 | End: 2024-06-22
Payer: COMMERCIAL

## 2024-06-22 LAB
ANION GAP SERPL CALCULATED.3IONS-SCNC: 15 MMOL/L (ref 7–16)
BASOPHILS ABSOLUTE: 0 K/CU MM
BASOPHILS RELATIVE PERCENT: 0.5 % (ref 0–1)
BUN SERPL-MCNC: 16 MG/DL (ref 6–23)
CALCIUM SERPL-MCNC: 9.5 MG/DL (ref 8.3–10.6)
CHLORIDE BLD-SCNC: 104 MMOL/L (ref 99–110)
CO2: 24 MMOL/L (ref 21–32)
CREAT SERPL-MCNC: 0.7 MG/DL (ref 0.6–1.1)
DIFFERENTIAL TYPE: ABNORMAL
EOSINOPHILS ABSOLUTE: 0 K/CU MM
EOSINOPHILS RELATIVE PERCENT: 0 % (ref 0–3)
GFR, ESTIMATED: >90 ML/MIN/1.73M2
GLUCOSE SERPL-MCNC: 124 MG/DL (ref 70–99)
HCT VFR BLD CALC: 44.9 % (ref 37–47)
HEMOGLOBIN: 14.4 GM/DL (ref 12.5–16)
IMMATURE NEUTROPHIL %: 0.3 % (ref 0–0.43)
LACTATE: 2.1 MMOL/L (ref 0.5–1.9)
LACTATE: 2.2 MMOL/L (ref 0.5–1.9)
LYMPHOCYTES ABSOLUTE: 0.8 K/CU MM
LYMPHOCYTES RELATIVE PERCENT: 10.7 % (ref 24–44)
MCH RBC QN AUTO: 32.6 PG (ref 27–31)
MCHC RBC AUTO-ENTMCNC: 32.1 % (ref 32–36)
MCV RBC AUTO: 101.6 FL (ref 78–100)
MONOCYTES ABSOLUTE: 0.3 K/CU MM
MONOCYTES RELATIVE PERCENT: 3.5 % (ref 0–4)
NEUTROPHILS ABSOLUTE: 6.6 K/CU MM
NEUTROPHILS RELATIVE PERCENT: 85 % (ref 36–66)
NUCLEATED RBC %: 0 %
PDW BLD-RTO: 12.9 % (ref 11.7–14.9)
PLATELET # BLD: 172 K/CU MM (ref 140–440)
PMV BLD AUTO: 11.8 FL (ref 7.5–11.1)
POTASSIUM SERPL-SCNC: 4.4 MMOL/L (ref 3.5–5.1)
RBC # BLD: 4.42 M/CU MM (ref 4.2–5.4)
SODIUM BLD-SCNC: 143 MMOL/L (ref 135–145)
TOTAL IMMATURE NEUTOROPHIL: 0.02 K/CU MM
TOTAL NUCLEATED RBC: 0 K/CU MM
WBC # BLD: 7.8 K/CU MM (ref 4–10.5)

## 2024-06-22 PROCEDURE — 6360000002 HC RX W HCPCS: Performed by: STUDENT IN AN ORGANIZED HEALTH CARE EDUCATION/TRAINING PROGRAM

## 2024-06-22 PROCEDURE — 36415 COLL VENOUS BLD VENIPUNCTURE: CPT

## 2024-06-22 PROCEDURE — 0D9670Z DRAINAGE OF STOMACH WITH DRAINAGE DEVICE, VIA NATURAL OR ARTIFICIAL OPENING: ICD-10-PCS | Performed by: STUDENT IN AN ORGANIZED HEALTH CARE EDUCATION/TRAINING PROGRAM

## 2024-06-22 PROCEDURE — 85025 COMPLETE CBC W/AUTO DIFF WBC: CPT

## 2024-06-22 PROCEDURE — 1200000000 HC SEMI PRIVATE

## 2024-06-22 PROCEDURE — 3E0G76Z INTRODUCTION OF NUTRITIONAL SUBSTANCE INTO UPPER GI, VIA NATURAL OR ARTIFICIAL OPENING: ICD-10-PCS | Performed by: STUDENT IN AN ORGANIZED HEALTH CARE EDUCATION/TRAINING PROGRAM

## 2024-06-22 PROCEDURE — 74018 RADEX ABDOMEN 1 VIEW: CPT

## 2024-06-22 PROCEDURE — 6360000002 HC RX W HCPCS: Performed by: SURGERY

## 2024-06-22 PROCEDURE — C9113 INJ PANTOPRAZOLE SODIUM, VIA: HCPCS | Performed by: SURGERY

## 2024-06-22 PROCEDURE — 80048 BASIC METABOLIC PNL TOTAL CA: CPT

## 2024-06-22 PROCEDURE — 2580000003 HC RX 258: Performed by: STUDENT IN AN ORGANIZED HEALTH CARE EDUCATION/TRAINING PROGRAM

## 2024-06-22 PROCEDURE — 83605 ASSAY OF LACTIC ACID: CPT

## 2024-06-22 PROCEDURE — 74019 RADEX ABDOMEN 2 VIEWS: CPT

## 2024-06-22 PROCEDURE — 6370000000 HC RX 637 (ALT 250 FOR IP): Performed by: SURGERY

## 2024-06-22 PROCEDURE — 2580000003 HC RX 258: Performed by: SURGERY

## 2024-06-22 PROCEDURE — 94761 N-INVAS EAR/PLS OXIMETRY MLT: CPT

## 2024-06-22 RX ORDER — METOCLOPRAMIDE HYDROCHLORIDE 5 MG/ML
10 INJECTION INTRAMUSCULAR; INTRAVENOUS EVERY 6 HOURS
Status: DISCONTINUED | OUTPATIENT
Start: 2024-06-22 | End: 2024-06-26 | Stop reason: HOSPADM

## 2024-06-22 RX ORDER — SODIUM CHLORIDE 9 MG/ML
INJECTION, SOLUTION INTRAVENOUS CONTINUOUS
Status: DISCONTINUED | OUTPATIENT
Start: 2024-06-22 | End: 2024-06-25

## 2024-06-22 RX ORDER — PANTOPRAZOLE SODIUM 40 MG/10ML
40 INJECTION, POWDER, LYOPHILIZED, FOR SOLUTION INTRAVENOUS DAILY
Status: DISCONTINUED | OUTPATIENT
Start: 2024-06-22 | End: 2024-06-26 | Stop reason: HOSPADM

## 2024-06-22 RX ORDER — PROMETHAZINE HYDROCHLORIDE 25 MG/1
25 TABLET ORAL EVERY 6 HOURS PRN
Status: DISCONTINUED | OUTPATIENT
Start: 2024-06-22 | End: 2024-06-26 | Stop reason: HOSPADM

## 2024-06-22 RX ADMIN — ONDANSETRON 4 MG: 2 INJECTION INTRAMUSCULAR; INTRAVENOUS at 09:17

## 2024-06-22 RX ADMIN — WATER: 100 IRRIGANT IRRIGATION at 13:20

## 2024-06-22 RX ADMIN — PANTOPRAZOLE SODIUM 40 MG: 40 INJECTION, POWDER, FOR SOLUTION INTRAVENOUS at 11:44

## 2024-06-22 RX ADMIN — SODIUM CHLORIDE, POTASSIUM CHLORIDE, SODIUM LACTATE AND CALCIUM CHLORIDE: 600; 310; 30; 20 INJECTION, SOLUTION INTRAVENOUS at 04:42

## 2024-06-22 RX ADMIN — SODIUM CHLORIDE: 9 INJECTION, SOLUTION INTRAVENOUS at 11:58

## 2024-06-22 RX ADMIN — KETOROLAC TROMETHAMINE 15 MG: 30 INJECTION, SOLUTION INTRAMUSCULAR; INTRAVENOUS at 09:19

## 2024-06-22 RX ADMIN — METOCLOPRAMIDE 10 MG: 5 INJECTION, SOLUTION INTRAMUSCULAR; INTRAVENOUS at 11:44

## 2024-06-22 RX ADMIN — ONDANSETRON 4 MG: 2 INJECTION INTRAMUSCULAR; INTRAVENOUS at 00:28

## 2024-06-22 RX ADMIN — KETOROLAC TROMETHAMINE 15 MG: 30 INJECTION, SOLUTION INTRAMUSCULAR; INTRAVENOUS at 00:30

## 2024-06-22 RX ADMIN — SODIUM CHLORIDE: 9 INJECTION, SOLUTION INTRAVENOUS at 19:06

## 2024-06-22 RX ADMIN — PROMETHAZINE HYDROCHLORIDE 25 MG: 25 TABLET ORAL at 15:01

## 2024-06-22 RX ADMIN — SODIUM CHLORIDE, PRESERVATIVE FREE 10 ML: 5 INJECTION INTRAVENOUS at 00:29

## 2024-06-22 RX ADMIN — METOCLOPRAMIDE 10 MG: 5 INJECTION, SOLUTION INTRAMUSCULAR; INTRAVENOUS at 23:25

## 2024-06-22 RX ADMIN — METOCLOPRAMIDE 10 MG: 5 INJECTION, SOLUTION INTRAMUSCULAR; INTRAVENOUS at 18:03

## 2024-06-22 RX ADMIN — KETOROLAC TROMETHAMINE 15 MG: 30 INJECTION, SOLUTION INTRAMUSCULAR; INTRAVENOUS at 18:12

## 2024-06-22 RX ADMIN — SODIUM CHLORIDE, PRESERVATIVE FREE 10 ML: 5 INJECTION INTRAVENOUS at 23:25

## 2024-06-22 RX ADMIN — BE HEALTH MAGNESIUM CITRATE ORAL SOLUTION - LEMON 296 ML: 1.75 LIQUID ORAL at 13:20

## 2024-06-22 ASSESSMENT — PAIN DESCRIPTION - ORIENTATION: ORIENTATION: MID;LOWER

## 2024-06-22 ASSESSMENT — PAIN SCALES - GENERAL
PAINLEVEL_OUTOF10: 4
PAINLEVEL_OUTOF10: 0
PAINLEVEL_OUTOF10: 6
PAINLEVEL_OUTOF10: 7

## 2024-06-22 ASSESSMENT — PAIN - FUNCTIONAL ASSESSMENT: PAIN_FUNCTIONAL_ASSESSMENT: ACTIVITIES ARE NOT PREVENTED

## 2024-06-22 ASSESSMENT — PAIN DESCRIPTION - LOCATION: LOCATION: ABDOMEN

## 2024-06-22 ASSESSMENT — PAIN DESCRIPTION - PAIN TYPE: TYPE: ACUTE PAIN

## 2024-06-22 ASSESSMENT — PAIN DESCRIPTION - DESCRIPTORS: DESCRIPTORS: ACHING;STABBING

## 2024-06-22 ASSESSMENT — PAIN DESCRIPTION - ONSET: ONSET: ON-GOING

## 2024-06-22 ASSESSMENT — PAIN DESCRIPTION - FREQUENCY: FREQUENCY: INTERMITTENT

## 2024-06-22 NOTE — PROGRESS NOTES
Patient seen and examined.  Still reporting centralized abdominal pain.  She has had a couple episodes of bilious emesis.  Reporting no flatus or BM.  She did drink a bottle of magnesium citrate last night.  Denies any fever or chills.  Reports nausea.    PE: Abdomen feels softer and still mildly distended.  Guarding.  No rebound.  No abdominal wall masses.  No CVA tenderness.  No herniations, pulsations or fluid shifts.  Vitals:    06/21/24 2147 06/21/24 2217 06/22/24 0152 06/22/24 0920   BP:   113/68 129/72   Pulse:   (!) 39 57   Resp: 20 20 16 26   Temp:   97.9 °F (36.6 °C) 97.4 °F (36.3 °C)   TempSrc:   Oral Oral   SpO2:   96% 100%   Weight:       Height:           Labs:  CBC:    Lab Results   Component Value Date/Time    WBC 7.8 06/22/2024 09:45 AM    HGB 14.4 06/22/2024 09:45 AM    HCT 44.9 06/22/2024 09:45 AM     06/22/2024 09:45 AM     BMP:    Lab Results   Component Value Date/Time     06/22/2024 09:45 AM    K 4.4 06/22/2024 09:45 AM     06/22/2024 09:45 AM    CO2 24 06/22/2024 09:45 AM    BUN 16 06/22/2024 09:45 AM    CREATININE 0.7 06/22/2024 09:45 AM    CALCIUM 9.5 06/22/2024 09:45 AM     PT/INR:    Lab Results   Component Value Date/Time    PROTIME 12.7 05/29/2019 05:35 AM    INR 1.12 05/29/2019 05:35 AM     PTT:    Lab Results   Component Value Date/Time    APTT 32.3 05/29/2019 05:35 AM     LFT:    Lab Results   Component Value Date/Time    BILITOT 0.3 06/21/2024 11:32 AM    AST 26 06/21/2024 11:32 AM    ALT 25 06/21/2024 11:32 AM    ALKPHOS 57 06/21/2024 11:32 AM       Imaging:    Narrative & Impression  Chest X-ray     INDICATION: Nausea     COMPARISON:  None     TECHNIQUE: AP/PA view of the chest was obtained.     FINDINGS: The lungs are clear of infiltrates. Left lower lobe granuloma is seen.  There are no infiltrates or effusions.  The heart size is normal.  The bony  thorax is intact.       IMPRESSION:  No acute findings in the chest  EXAM: CT ABDOMEN AND PELVIS WITH CONTRAST

## 2024-06-22 NOTE — CONSULTS
[facility] [place] [id number]                              CONSULTATION      PATIENT NAME: SWEETIE GARCIA               : 1975  MED REC NO: [id number]                      ROOM: 1116  ACCOUNT NO: [id number]                       ADMIT DATE: 2024  PROVIDER: Olegario Castro MD    SURGICAL CONSULTATION    CONSULT DATE:  2024    REFERRING PHYSICIAN:  Dr. Mario      REASON FOR CONSULTATION:  Abdominal pain.    HISTORY OF PRESENT ILLNESS:  The patient is a 49-year-old  female who is very well known and taken care of by my partner, Dr. Horn.  She did have a history of sigmoid diverticulitis which did require Jackeline's procedure and then my partner took her back to the operating room in 2019, and I did assist her with that surgery for her colostomy closure.  Her postoperative course was uneventful.  She has been doing well until recently when overnight she started to have acute abdominal pain, which made her very uncomfortable, and she does report it as being stabbing in the central area of her abdomen.  She does also report a small bowel movement with some retching.  She also reports taking laxative and the pain medications, but they did not help.  Prior to this, she denies any fevers or chills.  No chest pain or shortness of breath.  She does like to walk.  Previous day, she had walked about 10 to 15 miles, and she did state that she was feeling dizzy and that she may have gotten dehydrated.  She also reports being on a high-protein, high-fiber diet.  On her initial evaluation, her white count was normal, 6.5 with a normal creatinine of 0.6.  Liver function tests were also within normal limits with a normal pancreatic lipase of 24.  She then proceeded to get a CT of the abdomen and pelvis and that revealed a very large amount of stool burden and some dilated loops of small bowel with some of the

## 2024-06-22 NOTE — PROGRESS NOTES
V2.0    [facility] Progress Note      Name:  Rut Coronado /Age/Sex: 1975  (49 y.o. female)   MRN & CSN:  [id number] & [id number] Encounter Date/Time: 2024 10:25 AM EDT   Location:  [address] [place] [address] PCP: Rashaad Gaxiola MD     Attending:Davion Mario MD       Hospital Day: 2    Assessment and Recommendations   Rut Coronado is a 49 y.o. femalewho presents with SBO (small bowel obstruction) ([facility])      Plan:     Abdominal pain secondary to SBO versus severe constipation.  -Suspect dehydration as a trigger.  Laboratory studies unremarkable without any evidence of leukocytosis.  Abdominal exam without any rebound or guarding.  -Started on IV normal saline.  Toradol for pain control.  General surgery consulted in the ER.  They recommended mag citrate which has been ordered.  Got worse overnight and has had green emesis now.  Awaiting further surgery assessment and recommendations.  May need NG tube but will defer to surgery at this time.    Bradycardia  -Suspect physiologic given she is an avid walker and walks 10 to 15 miles a day.  Asymptomatic.  Rhythm is sinus.  No prolonged QT or NJ interval noted.  Low threshold for cardiology consultation if starts to have symptoms.      Diet ADULT DIET; Clear Liquid   DVT Prophylaxis [] Lovenox, []  Heparin, [] SCDs, [x] Ambulation,  [] Eliquis, [] Xarelto  [] Coumadin   Code Status Full Code   Disposition From: Home  Expected Disposition: Home  Estimated Date of Discharge: 2 to 3 days  Patient requires continued admission due to SBO   Surrogate Decision Maker/ Omar Gaona (Spouse)      Personally reviewed Lab Studies and Imaging     EKG interpreted personally and reveals sinus rhythm.  No prolonged QT or NJ interval noted.     Imaging that was interpreted personally includes CT abdomen and pelvis and results severe constipation     Drugs that require monitoring for toxicity include IV Toradol and the method of monitoring was creatinine        Subjective:  pelvis normal appendix. LYMPH NODES: No abnormally enlarged nodes. PERITONEUM/RETROPERITONEUM: No ascites or free air. VESSELS: Aorta is normal caliber and proximal branch vessels are patent.  The inferior vena cava, hepatic veins and portal venous system are patent. ABDOMINAL WALL: Normal. BONES: No destructive process. IMPRESSION: 1. Findings suggest distal small bowel obstruction.. Electronically signed by Braulio Lazar    CT ABDOMEN PELVIS W IV CONTRAST Additional Contrast? None    Result Date: 6/21/2024  Chest X-ray INDICATION: Nausea COMPARISON:  None TECHNIQUE: AP/PA view of the chest was obtained. FINDINGS: The lungs are clear of infiltrates. Left lower lobe granuloma is seen. There are no infiltrates or effusions.  The heart size is normal.  The bony thorax is intact.      No acute findings in the chest EXAM: CT ABDOMEN AND PELVIS WITH CONTRAST INDICATION: left sdied abd pain; nausea; hx of perf diverticulum, rule out curve/complication, Pain COMPARISON: CT scan performed on January 11 2023 TECHNIQUE: Axial CT imaging obtained from lung bases through pelvis. Axial images and multiplanar reformatted images are provided for review.   Individualized dose optimization technique was used in order to meet ALARA standards for radiation dose reduction.  In addition to vendor specific dose reduction algorithms, the dose reduction techniques vary based on the specific scanner utilized but frequently include automated exposure control, adjustment of the mA and/or kV according to patient size, and use of iterative reconstruction technique. IV Contrast: 100 mL Isovue-370 Oral Contrast: Yes. FINDINGS: LUNG BASES: Left lower lobe calcified granuloma seen. LIVER: Normal. GALLBLADDER AND BILIARY TREE: No calcified gallstones. No gallbladder distention.  No intra- or extrahepatic biliary dilatation. PANCREAS: Normal. SPLEEN: Normal. ADRENAL GLANDS: Normal. KIDNEYS AND URETERS: No hydronephrosis. No urolithiasis. URINARY

## 2024-06-22 NOTE — PLAN OF CARE
Problem: Discharge Planning  Goal: Discharge to home or other facility with appropriate resources  6/22/2024 0509 by Piotr Robb, RN  Outcome: Progressing  6/21/2024 1840 by Virginia Naranjo RN  Outcome: Progressing     Problem: Pain  Goal: Verbalizes/displays adequate comfort level or baseline comfort level  6/22/2024 0509 by Piotr Robb, RN  Outcome: Progressing  6/21/2024 1840 by Virginia Naranjo, RN  Outcome: Progressing

## 2024-06-23 LAB
ANION GAP SERPL CALCULATED.3IONS-SCNC: 12 MMOL/L (ref 7–16)
BASOPHILS ABSOLUTE: 0 K/CU MM
BASOPHILS RELATIVE PERCENT: 0.3 % (ref 0–1)
BUN SERPL-MCNC: 18 MG/DL (ref 6–23)
CALCIUM SERPL-MCNC: 9.4 MG/DL (ref 8.3–10.6)
CHLORIDE BLD-SCNC: 104 MMOL/L (ref 99–110)
CO2: 28 MMOL/L (ref 21–32)
CREAT SERPL-MCNC: 0.8 MG/DL (ref 0.6–1.1)
DIFFERENTIAL TYPE: ABNORMAL
EKG ATRIAL RATE: 45 BPM
EKG DIAGNOSIS: NORMAL
EKG P AXIS: 81 DEGREES
EKG P-R INTERVAL: 154 MS
EKG Q-T INTERVAL: 462 MS
EKG QRS DURATION: 58 MS
EKG QTC CALCULATION (BAZETT): 399 MS
EKG R AXIS: 70 DEGREES
EKG T AXIS: 36 DEGREES
EKG VENTRICULAR RATE: 45 BPM
EOSINOPHILS ABSOLUTE: 0 K/CU MM
EOSINOPHILS RELATIVE PERCENT: 0 % (ref 0–3)
GFR, ESTIMATED: 90 ML/MIN/1.73M2
GLUCOSE SERPL-MCNC: 118 MG/DL (ref 70–99)
HCT VFR BLD CALC: 42.8 % (ref 37–47)
HEMOGLOBIN: 13.7 GM/DL (ref 12.5–16)
IMMATURE NEUTROPHIL %: 0.5 % (ref 0–0.43)
LACTATE: 1.3 MMOL/L (ref 0.5–1.9)
LYMPHOCYTES ABSOLUTE: 0.7 K/CU MM
LYMPHOCYTES RELATIVE PERCENT: 7.7 % (ref 24–44)
MAGNESIUM: 2.4 MG/DL (ref 1.8–2.4)
MCH RBC QN AUTO: 31.9 PG (ref 27–31)
MCHC RBC AUTO-ENTMCNC: 32 % (ref 32–36)
MCV RBC AUTO: 99.5 FL (ref 78–100)
MONOCYTES ABSOLUTE: 0.5 K/CU MM
MONOCYTES RELATIVE PERCENT: 5.3 % (ref 0–4)
NEUTROPHILS ABSOLUTE: 7.4 K/CU MM
NEUTROPHILS RELATIVE PERCENT: 86.2 % (ref 36–66)
NUCLEATED RBC %: 0 %
PDW BLD-RTO: 13.3 % (ref 11.7–14.9)
PHOSPHORUS: 5 MG/DL (ref 2.5–4.9)
PLATELET # BLD: 182 K/CU MM (ref 140–440)
PMV BLD AUTO: 12.3 FL (ref 7.5–11.1)
POTASSIUM SERPL-SCNC: 4.2 MMOL/L (ref 3.5–5.1)
RBC # BLD: 4.3 M/CU MM (ref 4.2–5.4)
SODIUM BLD-SCNC: 144 MMOL/L (ref 135–145)
TOTAL IMMATURE NEUTOROPHIL: 0.04 K/CU MM
TOTAL NUCLEATED RBC: 0 K/CU MM
WBC # BLD: 8.6 K/CU MM (ref 4–10.5)

## 2024-06-23 PROCEDURE — 94761 N-INVAS EAR/PLS OXIMETRY MLT: CPT

## 2024-06-23 PROCEDURE — 2580000003 HC RX 258: Performed by: STUDENT IN AN ORGANIZED HEALTH CARE EDUCATION/TRAINING PROGRAM

## 2024-06-23 PROCEDURE — 83735 ASSAY OF MAGNESIUM: CPT

## 2024-06-23 PROCEDURE — 84100 ASSAY OF PHOSPHORUS: CPT

## 2024-06-23 PROCEDURE — 83605 ASSAY OF LACTIC ACID: CPT

## 2024-06-23 PROCEDURE — 6360000002 HC RX W HCPCS: Performed by: STUDENT IN AN ORGANIZED HEALTH CARE EDUCATION/TRAINING PROGRAM

## 2024-06-23 PROCEDURE — 36415 COLL VENOUS BLD VENIPUNCTURE: CPT

## 2024-06-23 PROCEDURE — 1200000000 HC SEMI PRIVATE

## 2024-06-23 PROCEDURE — 85025 COMPLETE CBC W/AUTO DIFF WBC: CPT

## 2024-06-23 PROCEDURE — C9113 INJ PANTOPRAZOLE SODIUM, VIA: HCPCS | Performed by: SURGERY

## 2024-06-23 PROCEDURE — 80048 BASIC METABOLIC PNL TOTAL CA: CPT

## 2024-06-23 PROCEDURE — 93010 ELECTROCARDIOGRAM REPORT: CPT | Performed by: INTERNAL MEDICINE

## 2024-06-23 PROCEDURE — 6360000002 HC RX W HCPCS: Performed by: SURGERY

## 2024-06-23 RX ORDER — LORAZEPAM 2 MG/ML
1 INJECTION INTRAMUSCULAR EVERY 6 HOURS PRN
Status: DISCONTINUED | OUTPATIENT
Start: 2024-06-23 | End: 2024-06-26 | Stop reason: HOSPADM

## 2024-06-23 RX ADMIN — KETOROLAC TROMETHAMINE 15 MG: 30 INJECTION, SOLUTION INTRAMUSCULAR; INTRAVENOUS at 04:30

## 2024-06-23 RX ADMIN — SODIUM CHLORIDE, PRESERVATIVE FREE 10 ML: 5 INJECTION INTRAVENOUS at 04:31

## 2024-06-23 RX ADMIN — SODIUM CHLORIDE, PRESERVATIVE FREE 10 ML: 5 INJECTION INTRAVENOUS at 21:37

## 2024-06-23 RX ADMIN — LORAZEPAM 1 MG: 2 INJECTION INTRAMUSCULAR; INTRAVENOUS at 21:37

## 2024-06-23 RX ADMIN — SODIUM CHLORIDE: 9 INJECTION, SOLUTION INTRAVENOUS at 19:40

## 2024-06-23 RX ADMIN — SODIUM CHLORIDE, PRESERVATIVE FREE 10 ML: 5 INJECTION INTRAVENOUS at 19:44

## 2024-06-23 RX ADMIN — PANTOPRAZOLE SODIUM 40 MG: 40 INJECTION, POWDER, FOR SOLUTION INTRAVENOUS at 12:03

## 2024-06-23 RX ADMIN — METOCLOPRAMIDE 10 MG: 5 INJECTION, SOLUTION INTRAMUSCULAR; INTRAVENOUS at 19:43

## 2024-06-23 RX ADMIN — METOCLOPRAMIDE 10 MG: 5 INJECTION, SOLUTION INTRAMUSCULAR; INTRAVENOUS at 12:03

## 2024-06-23 RX ADMIN — SODIUM CHLORIDE: 9 INJECTION, SOLUTION INTRAVENOUS at 11:46

## 2024-06-23 RX ADMIN — METOCLOPRAMIDE 10 MG: 5 INJECTION, SOLUTION INTRAMUSCULAR; INTRAVENOUS at 05:17

## 2024-06-23 RX ADMIN — SODIUM CHLORIDE: 9 INJECTION, SOLUTION INTRAVENOUS at 01:44

## 2024-06-23 RX ADMIN — SODIUM CHLORIDE, PRESERVATIVE FREE 10 ML: 5 INJECTION INTRAVENOUS at 05:17

## 2024-06-23 ASSESSMENT — PAIN SCALES - GENERAL
PAINLEVEL_OUTOF10: 6
PAINLEVEL_OUTOF10: 0
PAINLEVEL_OUTOF10: 6
PAINLEVEL_OUTOF10: 0
PAINLEVEL_OUTOF10: 0
PAINLEVEL_OUTOF10: 2

## 2024-06-23 ASSESSMENT — PAIN DESCRIPTION - LOCATION: LOCATION: ABDOMEN;BACK

## 2024-06-23 ASSESSMENT — PAIN - FUNCTIONAL ASSESSMENT: PAIN_FUNCTIONAL_ASSESSMENT: ACTIVITIES ARE NOT PREVENTED

## 2024-06-23 ASSESSMENT — PAIN DESCRIPTION - ORIENTATION: ORIENTATION: MID;LOWER

## 2024-06-23 ASSESSMENT — PAIN DESCRIPTION - PAIN TYPE: TYPE: ACUTE PAIN

## 2024-06-23 ASSESSMENT — PAIN DESCRIPTION - ONSET: ONSET: ON-GOING

## 2024-06-23 ASSESSMENT — PAIN DESCRIPTION - FREQUENCY: FREQUENCY: CONTINUOUS

## 2024-06-23 ASSESSMENT — PAIN DESCRIPTION - DESCRIPTORS: DESCRIPTORS: SORE;ACHING

## 2024-06-23 NOTE — PROGRESS NOTES
Patient seen and examined. Patient had multiple episodes of nausea and emesis yesterday, NG tube in place now.  Moderate amount of bilious drainage.  She did have a moderate-sized bowel movement.    Reports less abdominal pain, cramping sensation and fullness.    Still with a normal white count and her lactic acid level is normal.    PE: Abdomen is less distended.  Still with some central abdominal tenderness with palpation.  No rebound or guarding.  No CVA tenderness.  No herniations, pulsations or fluid shifts.  Vitals:    06/22/24 2106 06/22/24 2300 06/23/24 0430 06/23/24 1115   BP: 115/65 126/64  120/70   Pulse: 52   50   Resp: 17   16   Temp: 98.6 °F (37 °C) 97.4 °F (36.3 °C)  98 °F (36.7 °C)   TempSrc: Oral Oral  Oral   SpO2: 97% 98%  99%   Weight:   65.3 kg (144 lb)    Height:         Labs:  CBC:    Lab Results   Component Value Date/Time    WBC 8.6 06/23/2024 05:19 AM    HGB 13.7 06/23/2024 05:19 AM    HCT 42.8 06/23/2024 05:19 AM     06/23/2024 05:19 AM     BMP:    Lab Results   Component Value Date/Time     06/23/2024 05:19 AM    K 4.2 06/23/2024 05:19 AM     06/23/2024 05:19 AM    CO2 28 06/23/2024 05:19 AM    BUN 18 06/23/2024 05:19 AM    CREATININE 0.8 06/23/2024 05:19 AM    CALCIUM 9.4 06/23/2024 05:19 AM     PT/INR:    Lab Results   Component Value Date/Time    PROTIME 12.7 05/29/2019 05:35 AM    INR 1.12 05/29/2019 05:35 AM     PTT:    Lab Results   Component Value Date/Time    APTT 32.3 05/29/2019 05:35 AM     LFT:    Lab Results   Component Value Date/Time    BILITOT 0.3 06/21/2024 11:32 AM    AST 26 06/21/2024 11:32 AM    ALT 25 06/21/2024 11:32 AM    ALKPHOS 57 06/21/2024 11:32 AM       Imaging:  Narrative & Impression  ABDOMEN, 1 VIEW     CLINICAL INFORMATION: Feeding tube placement.     DATE: 6/22/2024     TECHNIQUE: Supine abdomen 1 image(s)     IMPRESSION:  RESULT/IMPRESSION:      There is a feeding tube with the side port below the diaphragm and the tip in  the gastric

## 2024-06-23 NOTE — PROGRESS NOTES
AND URETERS: No hydronephrosis. No urolithiasis. URINARY BLADDER: Normal. REPRODUCTIVE ORGANS: No mass. BOWEL multiple segments of dilated small bowel with some segments showing fecalized appearance which suggests chronic stasis. The distal ileum is decompressed. Point of transition is probably within the pelvis normal appendix. LYMPH NODES: No abnormally enlarged nodes. PERITONEUM/RETROPERITONEUM: No ascites or free air. VESSELS: Aorta is normal caliber and proximal branch vessels are patent.  The inferior vena cava, hepatic veins and portal venous system are patent. ABDOMINAL WALL: Normal. BONES: No destructive process. IMPRESSION: 1. Findings suggest distal small bowel obstruction.. Electronically signed by Braulio Lazar    CT ABDOMEN PELVIS W IV CONTRAST Additional Contrast? None    Result Date: 6/21/2024  Chest X-ray INDICATION: Nausea COMPARISON:  None TECHNIQUE: AP/PA view of the chest was obtained. FINDINGS: The lungs are clear of infiltrates. Left lower lobe granuloma is seen. There are no infiltrates or effusions.  The heart size is normal.  The bony thorax is intact.      No acute findings in the chest EXAM: CT ABDOMEN AND PELVIS WITH CONTRAST INDICATION: left sdied abd pain; nausea; hx of perf diverticulum, rule out curve/complication, Pain COMPARISON: CT scan performed on January 11 2023 TECHNIQUE: Axial CT imaging obtained from lung bases through pelvis. Axial images and multiplanar reformatted images are provided for review.   Individualized dose optimization technique was used in order to meet ALARA standards for radiation dose reduction.  In addition to vendor specific dose reduction algorithms, the dose reduction techniques vary based on the specific scanner utilized but frequently include automated exposure control, adjustment of the mA and/or kV according to patient size, and use of iterative reconstruction technique. IV Contrast: 100 mL Isovue-370 Oral Contrast: Yes. FINDINGS: LUNG BASES: Left  lower lobe calcified granuloma seen. LIVER: Normal. GALLBLADDER AND BILIARY TREE: No calcified gallstones. No gallbladder distention.  No intra- or extrahepatic biliary dilatation. PANCREAS: Normal. SPLEEN: Normal. ADRENAL GLANDS: Normal. KIDNEYS AND URETERS: No hydronephrosis. No urolithiasis. URINARY BLADDER: Normal. REPRODUCTIVE ORGANS: No mass. BOWEL multiple segments of dilated small bowel with some segments showing fecalized appearance which suggests chronic stasis. The distal ileum is decompressed. Point of transition is probably within the pelvis normal appendix. LYMPH NODES: No abnormally enlarged nodes. PERITONEUM/RETROPERITONEUM: No ascites or free air. VESSELS: Aorta is normal caliber and proximal branch vessels are patent.  The inferior vena cava, hepatic veins and portal venous system are patent. ABDOMINAL WALL: Normal. BONES: No destructive process. IMPRESSION: 1. Findings suggest distal small bowel obstruction.. Electronically signed by Braulio Lazar      CBC:   Recent Labs     06/21/24  1132 06/22/24  0945 06/23/24  0519   WBC 6.5 7.8 8.6   HGB 13.8 14.4 13.7    172 182       BMP:    Recent Labs     06/21/24  1132 06/22/24  0945 06/23/24  0519    143 144   K 4.1 4.4 4.2    104 104   CO2 26 24 28   BUN 18 16 18   CREATININE 0.6 0.7 0.8   GLUCOSE 119* 124* 118*       Hepatic:   Recent Labs     06/21/24  1132   AST 26   ALT 25   BILITOT 0.3   ALKPHOS 57         UA:  Lab Results   Component Value Date/Time    NITRU NEGATIVE 06/21/2024 11:32 AM    COLORU YELLOW 06/21/2024 11:32 AM    PHUR 7.5 06/21/2024 11:32 AM    WBCUA 3 06/21/2024 11:32 AM    RBCUA 93 01/11/2023 06:24 PM    MUCUS RARE 01/11/2023 06:24 PM    TRICHOMONAS NONE SEEN 01/11/2023 06:24 PM    YEAST RARE 08/31/2019 03:43 AM    BACTERIA RARE 01/11/2023 06:24 PM    CLARITYU CLEAR 06/21/2024 11:32 AM    LEUKOCYTESUR TRACE 06/21/2024 11:32 AM    UROBILINOGEN 0.2 06/21/2024 11:32 AM    BILIRUBINUR NEGATIVE 06/21/2024 11:32 AM

## 2024-06-24 ENCOUNTER — APPOINTMENT (OUTPATIENT)
Dept: GENERAL RADIOLOGY | Age: 49
DRG: 390 | End: 2024-06-24
Payer: COMMERCIAL

## 2024-06-24 LAB
ANION GAP SERPL CALCULATED.3IONS-SCNC: 9 MMOL/L (ref 7–16)
BASOPHILS ABSOLUTE: 0 K/CU MM
BASOPHILS RELATIVE PERCENT: 0.4 % (ref 0–1)
BUN SERPL-MCNC: 19 MG/DL (ref 6–23)
CALCIUM SERPL-MCNC: 8.8 MG/DL (ref 8.3–10.6)
CHLORIDE BLD-SCNC: 108 MMOL/L (ref 99–110)
CO2: 24 MMOL/L (ref 21–32)
CREAT SERPL-MCNC: 0.6 MG/DL (ref 0.6–1.1)
DIFFERENTIAL TYPE: ABNORMAL
EOSINOPHILS ABSOLUTE: 0 K/CU MM
EOSINOPHILS RELATIVE PERCENT: 0.3 % (ref 0–3)
GFR, ESTIMATED: >90 ML/MIN/1.73M2
GLUCOSE SERPL-MCNC: 109 MG/DL (ref 70–99)
HCT VFR BLD CALC: 37.4 % (ref 37–47)
HEMOGLOBIN: 12.2 GM/DL (ref 12.5–16)
IMMATURE NEUTROPHIL %: 0.1 % (ref 0–0.43)
LYMPHOCYTES ABSOLUTE: 0.7 K/CU MM
LYMPHOCYTES RELATIVE PERCENT: 10.5 % (ref 24–44)
MAGNESIUM: 2.2 MG/DL (ref 1.8–2.4)
MCH RBC QN AUTO: 32.6 PG (ref 27–31)
MCHC RBC AUTO-ENTMCNC: 32.6 % (ref 32–36)
MCV RBC AUTO: 100 FL (ref 78–100)
MONOCYTES ABSOLUTE: 0.8 K/CU MM
MONOCYTES RELATIVE PERCENT: 11.2 % (ref 0–4)
NEUTROPHILS ABSOLUTE: 5.3 K/CU MM
NEUTROPHILS RELATIVE PERCENT: 77.5 % (ref 36–66)
NUCLEATED RBC %: 0 %
PDW BLD-RTO: 13.3 % (ref 11.7–14.9)
PLATELET # BLD: 139 K/CU MM (ref 140–440)
PMV BLD AUTO: 12.6 FL (ref 7.5–11.1)
POTASSIUM SERPL-SCNC: 4.5 MMOL/L (ref 3.5–5.1)
RBC # BLD: 3.74 M/CU MM (ref 4.2–5.4)
SODIUM BLD-SCNC: 141 MMOL/L (ref 135–145)
TOTAL IMMATURE NEUTOROPHIL: 0.01 K/CU MM
TOTAL NUCLEATED RBC: 0 K/CU MM
WBC # BLD: 6.9 K/CU MM (ref 4–10.5)

## 2024-06-24 PROCEDURE — C9113 INJ PANTOPRAZOLE SODIUM, VIA: HCPCS | Performed by: SURGERY

## 2024-06-24 PROCEDURE — 76937 US GUIDE VASCULAR ACCESS: CPT

## 2024-06-24 PROCEDURE — 2500000003 HC RX 250 WO HCPCS: Performed by: SURGERY

## 2024-06-24 PROCEDURE — 83735 ASSAY OF MAGNESIUM: CPT

## 2024-06-24 PROCEDURE — 85025 COMPLETE CBC W/AUTO DIFF WBC: CPT

## 2024-06-24 PROCEDURE — 6360000002 HC RX W HCPCS: Performed by: STUDENT IN AN ORGANIZED HEALTH CARE EDUCATION/TRAINING PROGRAM

## 2024-06-24 PROCEDURE — 74018 RADEX ABDOMEN 1 VIEW: CPT

## 2024-06-24 PROCEDURE — 6360000002 HC RX W HCPCS: Performed by: SURGERY

## 2024-06-24 PROCEDURE — 74250 X-RAY XM SM INT 1CNTRST STD: CPT

## 2024-06-24 PROCEDURE — 36415 COLL VENOUS BLD VENIPUNCTURE: CPT

## 2024-06-24 PROCEDURE — 6360000004 HC RX CONTRAST MEDICATION: Performed by: SURGERY

## 2024-06-24 PROCEDURE — 2580000003 HC RX 258: Performed by: STUDENT IN AN ORGANIZED HEALTH CARE EDUCATION/TRAINING PROGRAM

## 2024-06-24 PROCEDURE — 1200000000 HC SEMI PRIVATE

## 2024-06-24 PROCEDURE — 80048 BASIC METABOLIC PNL TOTAL CA: CPT

## 2024-06-24 PROCEDURE — 94761 N-INVAS EAR/PLS OXIMETRY MLT: CPT

## 2024-06-24 RX ORDER — PROMETHAZINE HYDROCHLORIDE 25 MG/ML
12.5 INJECTION, SOLUTION INTRAMUSCULAR; INTRAVENOUS ONCE
Status: COMPLETED | OUTPATIENT
Start: 2024-06-24 | End: 2024-06-24

## 2024-06-24 RX ORDER — PROMETHAZINE HYDROCHLORIDE 25 MG/ML
12.5 INJECTION, SOLUTION INTRAMUSCULAR; INTRAVENOUS EVERY 4 HOURS PRN
Status: DISCONTINUED | OUTPATIENT
Start: 2024-06-24 | End: 2024-06-24

## 2024-06-24 RX ORDER — SODIUM CHLORIDE 0.9 % (FLUSH) 0.9 %
5-40 SYRINGE (ML) INJECTION EVERY 12 HOURS SCHEDULED
Status: DISCONTINUED | OUTPATIENT
Start: 2024-06-24 | End: 2024-06-26 | Stop reason: HOSPADM

## 2024-06-24 RX ORDER — LORAZEPAM 2 MG/ML
0.5 INJECTION INTRAMUSCULAR ONCE
Status: DISCONTINUED | OUTPATIENT
Start: 2024-06-24 | End: 2024-06-24

## 2024-06-24 RX ORDER — SODIUM CHLORIDE 9 MG/ML
INJECTION, SOLUTION INTRAVENOUS PRN
Status: DISCONTINUED | OUTPATIENT
Start: 2024-06-24 | End: 2024-06-26 | Stop reason: HOSPADM

## 2024-06-24 RX ORDER — SODIUM CHLORIDE 0.9 % (FLUSH) 0.9 %
5-40 SYRINGE (ML) INJECTION PRN
Status: DISCONTINUED | OUTPATIENT
Start: 2024-06-24 | End: 2024-06-26 | Stop reason: HOSPADM

## 2024-06-24 RX ADMIN — METOCLOPRAMIDE 10 MG: 5 INJECTION, SOLUTION INTRAMUSCULAR; INTRAVENOUS at 23:02

## 2024-06-24 RX ADMIN — ASCORBIC ACID, VITAMIN A PALMITATE, CHOLECALCIFEROL, THIAMINE HYDROCHLORIDE, RIBOFLAVIN-5 PHOSPHATE SODIUM, PYRIDOXINE HYDROCHLORIDE, NIACINAMIDE, DEXPANTHENOL, ALPHA-TOCOPHEROL ACETATE, VITAMIN K1, FOLIC ACID, BIOTIN, CYANOCOBALAMIN: 200; 3300; 200; 6; 3.6; 6; 40; 15; 10; 150; 600; 60; 5 INJECTION, SOLUTION INTRAVENOUS at 23:10

## 2024-06-24 RX ADMIN — LORAZEPAM 1 MG: 2 INJECTION INTRAMUSCULAR; INTRAVENOUS at 23:02

## 2024-06-24 RX ADMIN — ONDANSETRON 4 MG: 2 INJECTION INTRAMUSCULAR; INTRAVENOUS at 15:19

## 2024-06-24 RX ADMIN — SODIUM CHLORIDE, PRESERVATIVE FREE 10 ML: 5 INJECTION INTRAVENOUS at 01:40

## 2024-06-24 RX ADMIN — METOCLOPRAMIDE 10 MG: 5 INJECTION, SOLUTION INTRAMUSCULAR; INTRAVENOUS at 09:19

## 2024-06-24 RX ADMIN — METOCLOPRAMIDE 10 MG: 5 INJECTION, SOLUTION INTRAMUSCULAR; INTRAVENOUS at 15:19

## 2024-06-24 RX ADMIN — PROMETHAZINE HYDROCHLORIDE 12.5 MG: 25 INJECTION INTRAMUSCULAR; INTRAVENOUS at 20:25

## 2024-06-24 RX ADMIN — PANTOPRAZOLE SODIUM 40 MG: 40 INJECTION, POWDER, FOR SOLUTION INTRAVENOUS at 09:18

## 2024-06-24 RX ADMIN — DIATRIZOATE MEGLUMINE AND DIATRIZOATE SODIUM 240 ML: 660; 100 LIQUID ORAL; RECTAL at 14:53

## 2024-06-24 RX ADMIN — METOCLOPRAMIDE 10 MG: 5 INJECTION, SOLUTION INTRAMUSCULAR; INTRAVENOUS at 01:40

## 2024-06-24 ASSESSMENT — PAIN SCALES - GENERAL
PAINLEVEL_OUTOF10: 0
PAINLEVEL_OUTOF10: 2

## 2024-06-24 NOTE — PLAN OF CARE
Problem: Discharge Planning  Goal: Discharge to home or other facility with appropriate resources  Outcome: Progressing  Flowsheets (Taken 6/23/2024 1130 by Diana Dejesus LPN)  Discharge to home or other facility with appropriate resources: Identify barriers to discharge with patient and caregiver     Problem: Pain  Goal: Verbalizes/displays adequate comfort level or baseline comfort level  Outcome: Progressing  Flowsheets (Taken 6/23/2024 1115 by Diana Dejesus LPN)  Verbalizes/displays adequate comfort level or baseline comfort level: Encourage patient to monitor pain and request assistance

## 2024-06-24 NOTE — PROGRESS NOTES
Patient feels miserable. Is nauseated since drinking oral contrast.  Has had BM's today. Patient feeling anxious    PE:  Vitals:    06/23/24 2016 06/24/24 0427 06/24/24 0800 06/24/24 0915   BP: 126/62 129/71  118/71   Pulse: 62 (!) 49  55   Resp: 16 16  18   Temp: 99 °F (37.2 °C) 99 °F (37.2 °C)  98.5 °F (36.9 °C)   TempSrc: Oral Oral  Oral   SpO2: 98% 97% 96% 96%   Weight:  66.6 kg (146 lb 12.8 oz)     Height:         Abd: soft, increased distention, midl tenderness    Most recent x-ray from SB FT was seen. Patient with dilated SB, contrast in SB, stomach largely dilated with contrast.    A/P:  One time 0.5mg ativan. She received zofran and reglan at 3:35pm. I will give 1 time phenergan. Awaiting results of SB FT. If obstructed, will need surgery. Not sure if BM's are evacuating distal colon or the result of a partial SB obstruction. Next x-ray to be done around 6:30 tonight.

## 2024-06-24 NOTE — PROGRESS NOTES
V2.0    [facility] Progress Note      Name:  Rut Coronado /Age/Sex: 1975  (49 y.o. female)   MRN & CSN:  [id number] & [id number] Encounter Date/Time: 2024 10:25 AM EDT   Location:  [address] [place] [address] PCP: Rashaad Gaxiola MD     Attending:Davion Mario MD       Hospital Day: 4    Assessment and Recommendations   Rut Coronado is a 49 y.o. femalewho presents with SBO (small bowel obstruction) (HCC)      Plan:     Abdominal pain secondary to SBO versus severe constipation.  -Suspect dehydration as a trigger.  Laboratory studies unremarkable without any evidence of leukocytosis.  Abdominal exam without any rebound or guarding.  -Started on IV normal saline.  Toradol for pain control.  General surgery consulted in the ER.  They recommended mag citrate which has been ordered.  However had persistent nausea and vomiting requiring NG tube placement and decompression.  Now improved.  NG tube removed as per general surgery now.  Plan for small bowel follow-through given KUB to see if she needs surgical intervention.      Bradycardia  -Suspect physiologic given she is an avid walker and walks 10 to 15 miles a day.  Asymptomatic.  Rhythm is sinus.  No prolonged QT or NM interval noted.  Low threshold for cardiology consultation if starts to have symptoms.  -Has been stable without any symptoms and blood pressure has been tolerating.  Discontinue telemetry now and observe.      Diet Diet NPO Exceptions are: Ice Chips, Popsicles, Sips of Water with Meds   DVT Prophylaxis [] Lovenox, []  Heparin, [] SCDs, [x] Ambulation,  [] Eliquis, [] Xarelto  [] Coumadin   Code Status Full Code   Disposition From: Home  Expected Disposition: Home  Estimated Date of Discharge: 2 to 3 days  Patient requires continued admission due to SBO   Surrogate Decision Maker/ Omar Gaona (Spouse)      Personally reviewed Lab Studies and Imaging     EKG interpreted personally and reveals sinus rhythm.  No prolonged QT or NM interval  Lab Studies and Imaging     EKG interpreted personally and reveals sinus rhythm.  No prolonged QT or WA interval noted.     Imaging that was interpreted personally includes CT abdomen and pelvis and results severe constipation     Drugs that require monitoring for toxicity include IV Toradol and the method of monitoring was creatinine        Subjective:     Chief Complaint:   Chief Complaint   Patient presents with    Abdominal Pain     Since 0300; LLQ       Reports feeling improved.  Abdominal pain is improved and describes more as a sore in the lower quadrants now.  Denies any lightheadedness, chest pain, shortness of breath.    Review of Systems:      Pertinent positives and negatives discussed in HPI    Objective:     Intake/Output Summary (Last 24 hours) at 6/24/2024 1015  Last data filed at 6/23/2024 1703  Gross per 24 hour   Intake 240 ml   Output --   Net 240 ml        Vitals:   Vitals:    06/23/24 2016 06/24/24 0427 06/24/24 0800 06/24/24 0915   BP: 126/62 129/71  118/71   Pulse: 62 (!) 49  55   Resp: 16 16  18   Temp: 99 °F (37.2 °C) 99 °F (37.2 °C)  98.5 °F (36.9 °C)   TempSrc: Oral Oral  Oral   SpO2: 98% 97% 96% 96%   Weight:  66.6 kg (146 lb 12.8 oz)     Height:             Physical Exam:    General appearance: alert and cooperative with exam, ill-appearing.  NG tube in place.  Lungs: Not in respiratory distress.  Able to speak full sentences.  Lungs clear to auscultation.  Heart: regular rate and rhythm, S1, S2 normal, no murmur, click, rub or gallop  Abdomen: soft, distended mildly tender in the bilateral lower quadrants without any rebound or guarding.   Extremities: extremities normal, atraumatic, no color change, cyanosis or edema  Neurologic: Alert and oriented to time place and person.  Strength 5 out of 5 in bilateral upper and lower extremities.  Sensation to light touch is intact and equal in bilateral upper and lower extremities.          Medications:   Medications:    metoclopramide  10 mg

## 2024-06-24 NOTE — PROGRESS NOTES
Patient's x-ray reviewed this am (films and report). Dilated small bowel in pelvis. Will order small bowel follow-through to evaluate if ileus/pSBO or complete SBO. If complete, will plan surgery.

## 2024-06-24 NOTE — PROGRESS NOTES
Came to see patient,  at bedside. She is overall feeling better but hasn't slept well at all. No current nausea, no appetite. Had BM today and some flatus. Still with LLQ abdominal pain.    PE:  Vitals:    06/23/24 0430 06/23/24 1115 06/23/24 1421 06/23/24 2016   BP:  120/70 139/63 126/62   Pulse:  50 54 62   Resp:  16 16 16   Temp:  98 °F (36.7 °C) 97.6 °F (36.4 °C) 99 °F (37.2 °C)   TempSrc:  Oral Oral Oral   SpO2:  99% 98% 98%   Weight: 65.3 kg (144 lb)      Height:         Abd: soft, mild distention, minimal tenderness, no rebound, normo-active BS    X-rays reviewed  Labs reviewed    A/P;  PRN ativan ordered for sleep/anxiety  Ambulate  X-ray and labs in am  Plan discussed with the patient and her  at the bedside

## 2024-06-24 NOTE — PROGRESS NOTES
Patient seen and examined.  Patient had small bowel movements yesterday.  I did remove her NG tube yesterday afternoon.  Reporting less abdominal pain.  Tolerated liquids.  Labs noted.    PE: Abdomen is soft, not distended.  No rebound or guarding.  Not tympanic.  No CVA tenderness.  Vitals:    06/23/24 1421 06/23/24 2016 06/24/24 0427 06/24/24 0800   BP: 139/63 126/62 129/71    Pulse: 54 62 (!) 49    Resp: 16 16 16    Temp: 97.6 °F (36.4 °C) 99 °F (37.2 °C) 99 °F (37.2 °C)    TempSrc: Oral Oral Oral    SpO2: 98% 98% 97% 96%   Weight:   66.6 kg (146 lb 12.8 oz)    Height:           Labs:  CBC:    Lab Results   Component Value Date/Time    WBC 6.9 06/24/2024 04:26 AM    HGB 12.2 06/24/2024 04:26 AM    HCT 37.4 06/24/2024 04:26 AM     06/24/2024 04:26 AM     BMP:    Lab Results   Component Value Date/Time     06/24/2024 04:26 AM    K 4.5 06/24/2024 04:26 AM     06/24/2024 04:26 AM    CO2 24 06/24/2024 04:26 AM    BUN 19 06/24/2024 04:26 AM    CREATININE 0.6 06/24/2024 04:26 AM    CALCIUM 8.8 06/24/2024 04:26 AM     PT/INR:    Lab Results   Component Value Date/Time    PROTIME 12.7 05/29/2019 05:35 AM    INR 1.12 05/29/2019 05:35 AM     PTT:    Lab Results   Component Value Date/Time    APTT 32.3 05/29/2019 05:35 AM     LFT:    Lab Results   Component Value Date/Time    BILITOT 0.3 06/21/2024 11:32 AM    AST 26 06/21/2024 11:32 AM    ALT 25 06/21/2024 11:32 AM    ALKPHOS 57 06/21/2024 11:32 AM       Imaging:    Narrative & Impression  KUB     INDICATION:  PORTABLE. pSBO     COMPARISON: 6/22/2024     TECHNIQUE: Supine views of the abdomen were obtained.     FINDINGS: Dilated loops of small bowel in the lower abdomen. No renal calculi  are seen. Lung bases are clear.     IMPRESSION:  Findings concerning for ileus versus early small bowel obstruction     Electronically signed by Trino Johnson      Assessment/Plan:  Clinically improving from partial SBO versus ileus versus constipation.  Abdominal pain

## 2024-06-24 NOTE — FLOWSHEET NOTE
06/23/24 2009   Mobility   Level of Assistance Independent after set-up  (accompanied by )   Assistive Device None   Distance Ambulated (ft) 200 ft   Ambulation Response Tolerated well   Mita's Egress Test Pass

## 2024-06-25 ENCOUNTER — APPOINTMENT (OUTPATIENT)
Dept: GENERAL RADIOLOGY | Age: 49
DRG: 390 | End: 2024-06-25
Payer: COMMERCIAL

## 2024-06-25 LAB
ALBUMIN SERPL-MCNC: 3.7 GM/DL (ref 3.4–5)
ALP BLD-CCNC: 45 IU/L (ref 40–129)
ALT SERPL-CCNC: 82 U/L (ref 10–40)
ANION GAP SERPL CALCULATED.3IONS-SCNC: 10 MMOL/L (ref 7–16)
AST SERPL-CCNC: 71 IU/L (ref 15–37)
BASOPHILS ABSOLUTE: 0 K/CU MM
BASOPHILS RELATIVE PERCENT: 0.6 % (ref 0–1)
BILIRUB SERPL-MCNC: 0.6 MG/DL (ref 0–1)
BILIRUBIN DIRECT: 0.3 MG/DL (ref 0–0.3)
BILIRUBIN, INDIRECT: 0.3 MG/DL (ref 0–0.7)
BUN SERPL-MCNC: 18 MG/DL (ref 6–23)
CALCIUM SERPL-MCNC: 8.6 MG/DL (ref 8.3–10.6)
CHLORIDE BLD-SCNC: 109 MMOL/L (ref 99–110)
CO2: 25 MMOL/L (ref 21–32)
CREAT SERPL-MCNC: 0.5 MG/DL (ref 0.6–1.1)
DIFFERENTIAL TYPE: ABNORMAL
EOSINOPHILS ABSOLUTE: 0 K/CU MM
EOSINOPHILS RELATIVE PERCENT: 0.4 % (ref 0–3)
GFR, ESTIMATED: >90 ML/MIN/1.73M2
GLUCOSE BLD-MCNC: 102 MG/DL (ref 70–99)
GLUCOSE SERPL-MCNC: 117 MG/DL (ref 70–99)
HCT VFR BLD CALC: 34.5 % (ref 37–47)
HEMOGLOBIN: 11.5 GM/DL (ref 12.5–16)
IMMATURE NEUTROPHIL %: 0.2 % (ref 0–0.43)
LYMPHOCYTES ABSOLUTE: 0.8 K/CU MM
LYMPHOCYTES RELATIVE PERCENT: 15.1 % (ref 24–44)
MCH RBC QN AUTO: 32.8 PG (ref 27–31)
MCHC RBC AUTO-ENTMCNC: 33.3 % (ref 32–36)
MCV RBC AUTO: 98.3 FL (ref 78–100)
MONOCYTES ABSOLUTE: 0.7 K/CU MM
MONOCYTES RELATIVE PERCENT: 13.1 % (ref 0–4)
NEUTROPHILS ABSOLUTE: 3.5 K/CU MM
NEUTROPHILS RELATIVE PERCENT: 70.6 % (ref 36–66)
NUCLEATED RBC %: 0 %
PDW BLD-RTO: 13.1 % (ref 11.7–14.9)
PLATELET # BLD: 148 K/CU MM (ref 140–440)
PMV BLD AUTO: 12.4 FL (ref 7.5–11.1)
POTASSIUM SERPL-SCNC: 3.7 MMOL/L (ref 3.5–5.1)
PREALBUMIN: 11 MG/DL (ref 20–40)
RBC # BLD: 3.51 M/CU MM (ref 4.2–5.4)
SODIUM BLD-SCNC: 144 MMOL/L (ref 135–145)
TOTAL IMMATURE NEUTOROPHIL: 0.01 K/CU MM
TOTAL NUCLEATED RBC: 0 K/CU MM
TOTAL PROTEIN: 6.2 GM/DL (ref 6.4–8.2)
TRIGL SERPL-MCNC: 68 MG/DL
WBC # BLD: 5 K/CU MM (ref 4–10.5)

## 2024-06-25 PROCEDURE — 05HC33Z INSERTION OF INFUSION DEVICE INTO LEFT BASILIC VEIN, PERCUTANEOUS APPROACH: ICD-10-PCS | Performed by: STUDENT IN AN ORGANIZED HEALTH CARE EDUCATION/TRAINING PROGRAM

## 2024-06-25 PROCEDURE — 6360000002 HC RX W HCPCS: Performed by: SURGERY

## 2024-06-25 PROCEDURE — 2500000003 HC RX 250 WO HCPCS: Performed by: SURGERY

## 2024-06-25 PROCEDURE — 74018 RADEX ABDOMEN 1 VIEW: CPT

## 2024-06-25 PROCEDURE — 71045 X-RAY EXAM CHEST 1 VIEW: CPT

## 2024-06-25 PROCEDURE — 2580000003 HC RX 258: Performed by: STUDENT IN AN ORGANIZED HEALTH CARE EDUCATION/TRAINING PROGRAM

## 2024-06-25 PROCEDURE — 82962 GLUCOSE BLOOD TEST: CPT

## 2024-06-25 PROCEDURE — 85025 COMPLETE CBC W/AUTO DIFF WBC: CPT

## 2024-06-25 PROCEDURE — 76937 US GUIDE VASCULAR ACCESS: CPT

## 2024-06-25 PROCEDURE — 80053 COMPREHEN METABOLIC PANEL: CPT

## 2024-06-25 PROCEDURE — C1751 CATH, INF, PER/CENT/MIDLINE: HCPCS

## 2024-06-25 PROCEDURE — 2580000003 HC RX 258: Performed by: SURGERY

## 2024-06-25 PROCEDURE — 1200000000 HC SEMI PRIVATE

## 2024-06-25 PROCEDURE — 84478 ASSAY OF TRIGLYCERIDES: CPT

## 2024-06-25 PROCEDURE — 82248 BILIRUBIN DIRECT: CPT

## 2024-06-25 PROCEDURE — 36410 VNPNXR 3YR/> PHY/QHP DX/THER: CPT

## 2024-06-25 PROCEDURE — C9113 INJ PANTOPRAZOLE SODIUM, VIA: HCPCS | Performed by: SURGERY

## 2024-06-25 PROCEDURE — 94761 N-INVAS EAR/PLS OXIMETRY MLT: CPT

## 2024-06-25 PROCEDURE — 84134 ASSAY OF PREALBUMIN: CPT

## 2024-06-25 RX ADMIN — PANTOPRAZOLE SODIUM 40 MG: 40 INJECTION, POWDER, FOR SOLUTION INTRAVENOUS at 11:09

## 2024-06-25 RX ADMIN — METOCLOPRAMIDE 10 MG: 5 INJECTION, SOLUTION INTRAMUSCULAR; INTRAVENOUS at 19:16

## 2024-06-25 RX ADMIN — POTASSIUM CHLORIDE: 2 INJECTION, SOLUTION, CONCENTRATE INTRAVENOUS at 19:29

## 2024-06-25 RX ADMIN — METOCLOPRAMIDE 10 MG: 5 INJECTION, SOLUTION INTRAMUSCULAR; INTRAVENOUS at 11:09

## 2024-06-25 RX ADMIN — METOCLOPRAMIDE 10 MG: 5 INJECTION, SOLUTION INTRAMUSCULAR; INTRAVENOUS at 05:12

## 2024-06-25 RX ADMIN — SODIUM CHLORIDE: 9 INJECTION, SOLUTION INTRAVENOUS at 05:12

## 2024-06-25 RX ADMIN — SODIUM CHLORIDE, PRESERVATIVE FREE 10 ML: 5 INJECTION INTRAVENOUS at 00:08

## 2024-06-25 RX ADMIN — LORAZEPAM 1 MG: 2 INJECTION INTRAMUSCULAR; INTRAVENOUS at 20:33

## 2024-06-25 RX ADMIN — SODIUM CHLORIDE, PRESERVATIVE FREE 10 ML: 5 INJECTION INTRAVENOUS at 20:34

## 2024-06-25 RX ADMIN — SODIUM CHLORIDE, PRESERVATIVE FREE 10 ML: 5 INJECTION INTRAVENOUS at 20:33

## 2024-06-25 ASSESSMENT — PAIN SCALES - GENERAL: PAINLEVEL_OUTOF10: 0

## 2024-06-25 NOTE — PROGRESS NOTES
Comprehensive Nutrition Assessment    Type and Reason for Visit:  Initial (new TPN)    Nutrition Recommendations/Plan:   Recommend Clinimix 4.25/5 @ 84mL/hr w/ standard lipids provides an average of 971kcal (meets 54% est needs) and 86g AA (meets 101% est needs) per day   Monitor GI status, weights, glucose, lytes, TG, POC     Malnutrition Assessment:  Malnutrition Status:  Insufficient data (06/25/24 1028)    Context:  Acute Illness       Nutrition Assessment:    Admitted w/ SBO, limited medical hx, noted pt is very active at home, walks 10-15 miles per day. Receiving care at visit. Was started on TPN due to SBO, continued nausea/pain, but is having BMs. May need surgical intervention. Pt does not have a central line, would recommend changing TPN to 4.25/5 to be run via peripheral line. Limited recent wt hx per chart review. Follow at high nutrition risk.    Nutrition Related Findings:    reglan + protonix; elevated LFTs Wound Type: None       Current Nutrition Intake & Therapies:    Average Meal Intake: NPO  Average Supplements Intake: NPO  PN-Adult Premix  4.25/10 - Standard Electrolytes - Peripheral Line  Diet NPO Exceptions are: Ice Chips, Popsicles, Sips of Water with Meds, Sips of Clear Liquids    Anthropometric Measures:  Height: 162.6 cm (5' 4\")  Ideal Body Weight (IBW): 120 lbs (55 kg)    Admission Body Weight: 66.6 kg (146 lb 13.2 oz)  Current Body Weight: 66.6 kg (146 lb 13.2 oz),   IBW. Weight Source: Bed Scale  Current BMI (kg/m2): 25.2  Usual Body Weight: 57.2 kg (126 lb 1.7 oz) (2019)  % Weight Change (Calculated): 16.4  Weight Adjustment For: No Adjustment                 BMI Categories: Overweight (BMI 25.0-29.9)    Estimated Daily Nutrient Needs:  Energy Requirements Based On: Formula  Weight Used for Energy Requirements: Current  Energy (kcal/day): 5593-2394 (MSJ)  Weight Used for Protein Requirements: Current  Protein (g/day): 67-80 (1.0-1.2g/kg)  Method Used for Fluid Requirements: 1

## 2024-06-25 NOTE — CONSULTS
Discussed with Dr. Horn central access; explained patient access and current medication regime. Dr. Horn requesting that \"patient doesn't sleep good and could we place her on the list for a PICC first thing in the morning\". Will address with kiana MORGAN.

## 2024-06-25 NOTE — PROGRESS NOTES
V2.0    [facility] [place]      Name:  Rut Coronado /Age/Sex: 1975  (49 y.o. female)   MRN & CSN:  [id number] & [id number] Encounter Date/Time: 2024 10:25 AM EDT   [facility]:  [facility] PCP: Rashaad Gaxiola MD     Attending:Adrián Garcia MD       Hospital Day: 5    Assessment and Recommendations   Rut Coronado is a 49 y.o. femalewho presents with SBO (small bowel obstruction) (HCC)      Plan:     Abdominal pain secondary to likely severe constipation, less likely SBP  -Suspect dehydration as a trigger.  Laboratory studies unremarkable without any evidence of leukocytosis.  Abdominal exam without any rebound or guarding.  -Started on IV normal saline.  Toradol for pain control.  General surgery consulted in the ER.  They recommended mag citrate which has been ordered.  However had persistent nausea and vomiting requiring NG tube placement and decompression.  Now improved.  NG tube removed as per general surgery now.  Small bowel follow through done with pt. Having multiple bowel movements. On CLD for today.       Bradycardia  -Suspect physiologic given she is an avid walker and walks 10 to 15 miles a day.  Asymptomatic.  Rhythm is sinus.  No prolonged QT or WA interval noted.  Low threshold for cardiology consultation if starts to have symptoms.    Anemia  -No signs of blood loss.  Likely from dilution given she has gotten a lot of IV fluids.  Monitor CBCs.      Diet PN-Adult Premix  4.25/10 - Standard Electrolytes - Peripheral Line  ADULT DIET; Clear Liquid  PN-Adult Premix 4.25/10 - Peripheral Line   DVT Prophylaxis [] Lovenox, []  Heparin, [] SCDs, [x] Ambulation,  [] Eliquis, [] Xarelto  [] Coumadin   Code Status Full Code   Disposition From: Home  Expected Disposition: Home  Estimated Date of Discharge: Possibly tomorrow if ok with GS  Patient requires continued admission due to awaiting return of bowel function   Surrogate Decision Maker/ Omar Gaona (Spouse)   for radiation dose reduction.  In addition to vendor specific dose reduction algorithms, the dose reduction techniques vary based on the specific scanner utilized but frequently include automated exposure control, adjustment of the mA and/or kV according to patient size, and use of iterative reconstruction technique. IV Contrast: 100 mL Isovue-370 Oral Contrast: Yes. FINDINGS: LUNG BASES: Left lower lobe calcified granuloma seen. LIVER: Normal. GALLBLADDER AND BILIARY TREE: No calcified gallstones. No gallbladder distention.  No intra- or extrahepatic biliary dilatation. PANCREAS: Normal. SPLEEN: Normal. ADRENAL GLANDS: Normal. KIDNEYS AND URETERS: No hydronephrosis. No urolithiasis. URINARY BLADDER: Normal. REPRODUCTIVE ORGANS: No mass. BOWEL multiple segments of dilated small bowel with some segments showing fecalized appearance which suggests chronic stasis. The distal ileum is decompressed. Point of transition is probably within the pelvis normal appendix. LYMPH NODES: No abnormally enlarged nodes. PERITONEUM/RETROPERITONEUM: No ascites or free air. VESSELS: Aorta is normal caliber and proximal branch vessels are patent.  The inferior vena cava, hepatic veins and portal venous system are patent. ABDOMINAL WALL: Normal. BONES: No destructive process. IMPRESSION: 1. Findings suggest distal small bowel obstruction.. Electronically signed by Braulio Lazar         CBC:   Recent Labs     06/23/24  0519 06/24/24  0426 06/25/24  0403   WBC 8.6 6.9 5.0   HGB 13.7 12.2* 11.5*    139* 148       BMP:    Recent Labs     06/23/24  0519 06/24/24  0426 06/25/24  0403    141 144   K 4.2 4.5 3.7    108 109   CO2 28 24 25   BUN 18 19 18   CREATININE 0.8 0.6 0.5*   GLUCOSE 118* 109* 117*       Hepatic:   Recent Labs     06/25/24  0403   AST 71*   ALT 82*   BILITOT 0.6   ALKPHOS 45         UA:  Lab Results   Component Value Date/Time    NITRU NEGATIVE 06/21/2024 11:32 AM    COLORU YELLOW 06/21/2024 11:32 AM    PHUR

## 2024-06-25 NOTE — CONSULTS
Patient uncomfortable and unable to maintain positioning at this time. Worked out POC with patient that includes advanced access placement if patient condition changes. Discussed PICC placement with patient and patient was unaware and \"unsure as to why I need a PICC?\". Patient does not meet clinical indications at this time for central access.     Consult completed. Nexiva 20g 1.75\" peripheral IV initiated into right forearm x 1 attempt using ultrasound guided technique. Brisk blood return, flushes without resistance. Patient tolerated well. Primary nurse AALIYAH Diaz notified.     Consult the Vascular Access Team for questions, concerns, or change in patient's condition.

## 2024-06-25 NOTE — PROGRESS NOTES
06/25/24 1203   Encounter Summary   Encounter Overview/Reason Volunteer Encounter   Service Provided For Patient   Referral/Consult From Volunteer   Support System Family members   Last Encounter  06/25/24  (Visit by Euccal Mccormick, Charted by  Doug)   Complexity of Encounter Low   Begin Time 1000   End Time  1010   Total Time Calculated 10 min   Spiritual/Emotional needs   Type Spiritual Support   Assessment/Intervention/Outcome   Assessment Calm   Intervention Active listening   Outcome Coping   Plan and Referrals   Plan/Referrals Continue Support (comment)

## 2024-06-25 NOTE — CONSULTS
Consult completed.    Indication for line: Limited/no vessel suitable for conventional peripheral access  Medication/Anticipated Duration: 3 Days  Ordering Provider: Dr. Horn    LINE STATUS: READY TO USE MIDLINE    Midline insertion education provided to patient, risks and benefits discussed and reviewed with patient. Patient verbalized understanding, questions answered. Pressure injectable BARD PowerGlide Pro™ 20g 10 cm single lumen midline inserted into LUE basilic vein x 1 attempt using sterile ultrasound guided technique without difficulty per protocol. Brisk blood return noted and flushes without resistance. Patient tolerated procedure well. Ultrasound photos of vein diameter provided below. Primary nurse AALIYAH Conrad notified and aware.    Consult the Vascular Access Team for questions, concerns, or change in patient's condition.

## 2024-06-26 VITALS
HEART RATE: 69 BPM | SYSTOLIC BLOOD PRESSURE: 113 MMHG | RESPIRATION RATE: 18 BRPM | HEIGHT: 64 IN | OXYGEN SATURATION: 96 % | BODY MASS INDEX: 15.16 KG/M2 | WEIGHT: 88.8 LBS | DIASTOLIC BLOOD PRESSURE: 77 MMHG | TEMPERATURE: 98.1 F

## 2024-06-26 LAB
ANION GAP SERPL CALCULATED.3IONS-SCNC: 9 MMOL/L (ref 7–16)
BUN SERPL-MCNC: 15 MG/DL (ref 6–23)
CALCIUM SERPL-MCNC: 8.1 MG/DL (ref 8.3–10.6)
CHLORIDE BLD-SCNC: 106 MMOL/L (ref 99–110)
CO2: 25 MMOL/L (ref 21–32)
CREAT SERPL-MCNC: 0.6 MG/DL (ref 0.6–1.1)
GFR, ESTIMATED: >90 ML/MIN/1.73M2
GLUCOSE BLD-MCNC: 113 MG/DL (ref 70–99)
GLUCOSE BLD-MCNC: 96 MG/DL (ref 70–99)
GLUCOSE SERPL-MCNC: 120 MG/DL (ref 70–99)
MAGNESIUM: 1.8 MG/DL (ref 1.8–2.4)
PHOSPHORUS: 2.6 MG/DL (ref 2.5–4.9)
POTASSIUM SERPL-SCNC: 3.8 MMOL/L (ref 3.5–5.1)
SODIUM BLD-SCNC: 140 MMOL/L (ref 135–145)

## 2024-06-26 PROCEDURE — 82962 GLUCOSE BLOOD TEST: CPT

## 2024-06-26 PROCEDURE — 80048 BASIC METABOLIC PNL TOTAL CA: CPT

## 2024-06-26 PROCEDURE — 2580000003 HC RX 258: Performed by: SURGERY

## 2024-06-26 PROCEDURE — 84100 ASSAY OF PHOSPHORUS: CPT

## 2024-06-26 PROCEDURE — C9113 INJ PANTOPRAZOLE SODIUM, VIA: HCPCS | Performed by: SURGERY

## 2024-06-26 PROCEDURE — 83735 ASSAY OF MAGNESIUM: CPT

## 2024-06-26 PROCEDURE — 94761 N-INVAS EAR/PLS OXIMETRY MLT: CPT

## 2024-06-26 PROCEDURE — 6360000002 HC RX W HCPCS: Performed by: SURGERY

## 2024-06-26 PROCEDURE — 6360000002 HC RX W HCPCS: Performed by: STUDENT IN AN ORGANIZED HEALTH CARE EDUCATION/TRAINING PROGRAM

## 2024-06-26 RX ORDER — PANTOPRAZOLE SODIUM 40 MG/1
40 TABLET, DELAYED RELEASE ORAL
Qty: 14 TABLET | Refills: 0 | Status: SHIPPED | OUTPATIENT
Start: 2024-06-26 | End: 2024-07-10

## 2024-06-26 RX ADMIN — PANTOPRAZOLE SODIUM 40 MG: 40 INJECTION, POWDER, FOR SOLUTION INTRAVENOUS at 11:18

## 2024-06-26 RX ADMIN — METOCLOPRAMIDE 10 MG: 5 INJECTION, SOLUTION INTRAMUSCULAR; INTRAVENOUS at 00:56

## 2024-06-26 RX ADMIN — SODIUM CHLORIDE, PRESERVATIVE FREE 10 ML: 5 INJECTION INTRAVENOUS at 09:53

## 2024-06-26 RX ADMIN — KETOROLAC TROMETHAMINE 15 MG: 30 INJECTION, SOLUTION INTRAMUSCULAR; INTRAVENOUS at 01:52

## 2024-06-26 RX ADMIN — METOCLOPRAMIDE 10 MG: 5 INJECTION, SOLUTION INTRAMUSCULAR; INTRAVENOUS at 06:51

## 2024-06-26 RX ADMIN — METOCLOPRAMIDE 10 MG: 5 INJECTION, SOLUTION INTRAMUSCULAR; INTRAVENOUS at 11:18

## 2024-06-26 RX ADMIN — LORAZEPAM 1 MG: 2 INJECTION INTRAMUSCULAR; INTRAVENOUS at 03:34

## 2024-06-26 ASSESSMENT — PAIN DESCRIPTION - LOCATION: LOCATION: ABDOMEN;HEAD

## 2024-06-26 ASSESSMENT — PAIN DESCRIPTION - DESCRIPTORS: DESCRIPTORS: ACHING

## 2024-06-26 NOTE — PROGRESS NOTES
Outpatient Pharmacy Progress Note for Meds-to-Beds    Total number of Prescriptions Filled: 1    Additional Documentation:  Patient picked-up the medication(s) in the OP Pharmacy      Thank you for letting us serve your patients.  [address] [address]    Phone: [phone]    Fax: [phone]

## 2024-06-26 NOTE — PROGRESS NOTES
Patient seen and examined. She is feeling better. Nausea has resolved. She has tolerated bites of food last night and had part of peanut butter sandwich this am. Currently eating soup.    PE:  Vitals:    06/25/24 1508 06/25/24 2015 06/26/24 0312 06/26/24 0945   BP: (!) 113/55 101/63 (!) 102/59 113/77   Pulse: 52 52 56 69   Resp: 17 16 18 18   Temp: 98.4 °F (36.9 °C) 98.6 °F (37 °C) 98.7 °F (37.1 °C) 98.1 °F (36.7 °C)   TempSrc: Oral Oral Oral Oral   SpO2: 96% 96% 94% 96%   Weight:   40.3 kg (88 lb 12.8 oz)    Height:         Abd: soft, non-tender, non-distended    Labs reviewed    A/P:  Patient very motivated to go home today. Is eating food. If she continues to tolerate food, OK to discharge home from a surgical standpoint. TPN currently decreased and will wean to off today. Plan to stop TPN at 1500 and then could discharge home if continues to tolerate PO.

## 2024-06-26 NOTE — PLAN OF CARE
Problem: Discharge Planning  Goal: Discharge to home or other facility with appropriate resources  6/26/2024 1501 by Giselle Quintero LPN  Outcome: Completed  6/26/2024 1016 by Giselle Quintero LPN  Outcome: Progressing     Problem: Pain  Goal: Verbalizes/displays adequate comfort level or baseline comfort level  6/26/2024 1501 by Giselle Quintero LPN  Outcome: Completed  6/26/2024 1016 by Giselle Quintero LPN  Outcome: Progressing     Problem: Nutrition Deficit:  Goal: Optimize nutritional status  6/26/2024 1501 by Giselle Quintero LPN  Outcome: Completed  6/26/2024 1016 by Giselle Quintero LPN  Outcome: Progressing

## 2024-06-26 NOTE — PROGRESS NOTES
Pt eager for D/C. Seems to be tolerating advanced diet at this time. AVS went over with pt. Pt confirmed her  got script for protonix. All questions answered at this time.

## 2024-06-26 NOTE — DISCHARGE INSTRUCTIONS
Internal Medicine Discharge Instruction    Discharge to:  Home  Diet: Regular diet  Activity: As tolerated       Be compliant with medications  Please take medications as prescribed         Electronically signed by Adrián Garcia MD on 6/26/2024 at 7:38 AM

## 2024-06-26 NOTE — PROGRESS NOTES
LATE ENTRY    Patient was seen and examined yesterday 6/25 in am and pm  Nausea has gotten better. No abdominal pain.    PE:  Vitals:    06/25/24 1508 06/25/24 2015 06/26/24 0312 06/26/24 0945   BP: (!) 113/55 101/63 (!) 102/59 113/77   Pulse: 52 52 56 69   Resp: 17 16 18 18   Temp: 98.4 °F (36.9 °C) 98.6 °F (37 °C) 98.7 °F (37.1 °C) 98.1 °F (36.7 °C)   TempSrc: Oral Oral Oral Oral   SpO2: 96% 96% 94% 96%   Weight:   40.3 kg (88 lb 12.8 oz)    Height:         Abd: soft,mild distention    Labs reviewed    A/P:  Diet advanced  Ambulated several times in the chu

## 2024-06-26 NOTE — DISCHARGE SUMMARY
Please use this note as a progress note for the day if the patient does not discharge today      Discharge Summary    Name:  Rut Coronado /Age/Sex: 1975  (49 y.o. female)   MRN & CSN:  [id number] & [id number] Admission Date/Time: 2024 11:52 AM   Attending:  Adrián Garcia MD Discharging Physician: Adrián Garcia MD       Admission Diagnosis:   Abdominal pain secondary to SBO versus severe constipation   Bradycardia    Discharge Diagnosis, hospital course, assessment and plan:  Rut Coronado is a 49 y.o.  female  who presents with SBO (small bowel obstruction) (HCC)    Patient admitted on 2024 11:52 AM    Per H+P:  Rut Coronado is a 49 y.o. female  who presents with sharp bilateral lower quadrant abdominal pain rated 10 out of 10 that started at 3 AM this morning.  Reports she was sleeping when the pain started.  Also had some nausea but no vomiting.  She did take a laxative and pain medication which did not help.  She denies any fevers, chills, chest pain, shortness of breath.  Reports she is an avid walker and walks 10 to 15 miles a day.  Reports having history of slow heart rate but no symptoms to go with it.  Reports having regular bowel movements daily without any issues but her diet is high protein and high-fiber.     Abdominal pain secondary to likely severe constipation, less likely SBP  -Suspect dehydration as a trigger.  Laboratory studies unremarkable without any evidence of leukocytosis.  Abdominal exam without any rebound or guarding.  -Started on IV normal saline.  Toradol for pain control.  General surgery consulted in the ER.  They recommended mag citrate which has been ordered.  However had persistent nausea and vomiting requiring NG tube placement and decompression.  Now improved.  NG tube removed as per general surgery now.  Small bowel follow through done with pt. Having multiple bowel movements. On general diet. Weaned off TPN        Bradycardia  -Suspect  are patent. ABDOMINAL WALL: Normal. BONES: No destructive process. IMPRESSION: 1. Findings suggest distal small bowel obstruction.. Electronically signed by Braulio Lazar    CT ABDOMEN PELVIS W IV CONTRAST Additional Contrast? None    Result Date: 6/21/2024  Chest X-ray INDICATION: Nausea COMPARISON:  None TECHNIQUE: AP/PA view of the chest was obtained. FINDINGS: The lungs are clear of infiltrates. Left lower lobe granuloma is seen. There are no infiltrates or effusions.  The heart size is normal.  The bony thorax is intact.      No acute findings in the chest EXAM: CT ABDOMEN AND PELVIS WITH CONTRAST INDICATION: left sdied abd pain; nausea; hx of perf diverticulum, rule out curve/complication, Pain COMPARISON: CT scan performed on January 11 2023 TECHNIQUE: Axial CT imaging obtained from lung bases through pelvis. Axial images and multiplanar reformatted images are provided for review.   Individualized dose optimization technique was used in order to meet ALARA standards for radiation dose reduction.  In addition to vendor specific dose reduction algorithms, the dose reduction techniques vary based on the specific scanner utilized but frequently include automated exposure control, adjustment of the mA and/or kV according to patient size, and use of iterative reconstruction technique. IV Contrast: 100 mL Isovue-370 Oral Contrast: Yes. FINDINGS: LUNG BASES: Left lower lobe calcified granuloma seen. LIVER: Normal. GALLBLADDER AND BILIARY TREE: No calcified gallstones. No gallbladder distention.  No intra- or extrahepatic biliary dilatation. PANCREAS: Normal. SPLEEN: Normal. ADRENAL GLANDS: Normal. KIDNEYS AND URETERS: No hydronephrosis. No urolithiasis. URINARY BLADDER: Normal. REPRODUCTIVE ORGANS: No mass. BOWEL multiple segments of dilated small bowel with some segments showing fecalized appearance which suggests chronic stasis. The distal ileum is decompressed. Point of transition is probably within the pelvis

## 2024-06-27 ENCOUNTER — TELEPHONE (OUTPATIENT)
Dept: FAMILY MEDICINE CLINIC | Age: 49
End: 2024-06-27

## 2024-06-27 NOTE — TELEPHONE ENCOUNTER
Care Transitions Initial Follow Up Call    Outreach made within 2 business days of discharge: Yes    Patient: Rut Coronado Patient : 1975   MRN: <[id number]>  Reason for Admission: There are no discharge diagnoses documented for the most recent discharge.  Discharge Date: 24       Spoke with:     Follow Up  No future appointments.    Salome Baeza LPN

## 2024-06-28 ENCOUNTER — TELEPHONE (OUTPATIENT)
Dept: FAMILY MEDICINE CLINIC | Age: 49
End: 2024-06-28

## 2024-06-28 NOTE — TELEPHONE ENCOUNTER
Care Transitions Initial Follow Up Call    Outreach made within 2 business days of discharge: Yes    Patient: Rut Coronado Patient : 1975   MRN: <[id number]>  Reason for Admission: There are no discharge diagnoses documented for the most recent discharge.  Discharge Date: 24       Spoke with: Pt     Discharge department/facility: [facility]    TCM Interactive Patient Contact:  Was patient able to fill all prescriptions: Yes  Was patient instructed to bring all medications to the follow-up visit: Yes  Is patient taking all medications as directed in the discharge summary? Yes  Does patient understand their discharge instructions: Yes  Does patient have questions or concerns that need addressed prior to 7-14 day follow up office visit: no, pt states she will call after getting son to collage on Monday. Pt is very upset w care at hospital.      Salome Baeza LPN

## 2024-07-10 ENCOUNTER — TELEPHONE (OUTPATIENT)
Dept: GASTROENTEROLOGY | Age: 49
End: 2024-07-10

## 2024-07-10 DIAGNOSIS — K56.609 SBO (SMALL BOWEL OBSTRUCTION) (HCC): Primary | ICD-10-CM

## 2024-07-10 NOTE — TELEPHONE ENCOUNTER
Attempted to schedule patient from a referral received from Cortney Parmar for a possible small bowel obstruction.  Unable to reach patient.  Left a detailed message requesting a call back.    Please book patient next available day with Dr. March.

## 2024-07-17 ENCOUNTER — OFFICE VISIT (OUTPATIENT)
Dept: GASTROENTEROLOGY | Age: 49
End: 2024-07-17
Payer: COMMERCIAL

## 2024-07-17 VITALS
OXYGEN SATURATION: 98 % | HEART RATE: 70 BPM | BODY MASS INDEX: 21.41 KG/M2 | HEIGHT: 64 IN | SYSTOLIC BLOOD PRESSURE: 120 MMHG | TEMPERATURE: 97.7 F | DIASTOLIC BLOOD PRESSURE: 54 MMHG | WEIGHT: 125.4 LBS

## 2024-07-17 DIAGNOSIS — K63.1 PERFORATION OF SIGMOID COLON (HCC): ICD-10-CM

## 2024-07-17 DIAGNOSIS — K59.00 CONSTIPATION, UNSPECIFIED CONSTIPATION TYPE: ICD-10-CM

## 2024-07-17 DIAGNOSIS — K56.609 SBO (SMALL BOWEL OBSTRUCTION) (HCC): Primary | ICD-10-CM

## 2024-07-17 PROCEDURE — 99203 OFFICE O/P NEW LOW 30 MIN: CPT | Performed by: INTERNAL MEDICINE

## 2024-07-17 NOTE — PROGRESS NOTES
[facility] Gastroenterology and Hepatology             MD Memo Marsh MD Carol Christensen, APRN-CNP       Salome Dai, APRN-CNP             [address] [place] [address] [phone] fax [phone]        Gastroenterology Clinic Consultation    Memo March MD  Encounter Date: 07/17/24     CC: New Patient (Partial small bowel obstruction- no surgery, liquid diet . No longer nauseated, pt feels she's heeled, she's not hurting anymore./Pt would like nutrient advise./Diverticulitis history.)       Cortney Parmar, APRN - CNP  [address] [place] [address]     History obtained from: patient,  medical records     Subjective:       Rut Coronado is an 49 y.o. female with past medical history of diverticulitis with abscess status post exploratory laparotomy with incidental appendectomy, sigmoid colon resection and drainage of abdominal abscess by Dr. Horn back in 2019 who presents for New Patient (Partial small bowel obstruction- no surgery, liquid diet . No longer nauseated, pt feels she's heeled, she's not hurting anymore./Pt would like nutrient advise./Diverticulitis history.)    According to the patient's chart patient was recently admitted to the hospital in end of June 2024 with complaint of abdominal pain that was 10 out of 10 associated with nausea.  Her imaging revealed concern for dilated bowel loops raising concern for partial SBO that was managed medically with NG tube placement and decompression and subsequently improved.  She was also started by on TPN which was weaned off.  Patient did have a small bowel follow-through on 6/26/2024 which also showed findings consistent with SBO.  After discharge she did have a repeat CT done at [facility] Which again revealed diffuse dilation of the small bowel loops throughout the abdomen, transition zone not clearly identified but

## 2024-07-18 ENCOUNTER — HOSPITAL ENCOUNTER (OUTPATIENT)
Age: 49
Discharge: HOME | End: 2024-07-18
Payer: COMMERCIAL

## 2024-07-18 DIAGNOSIS — K56.609 SBO (SMALL BOWEL OBSTRUCTION) (HCC): ICD-10-CM

## 2024-07-18 DIAGNOSIS — K63.1 PERFORATION OF SIGMOID COLON (HCC): ICD-10-CM

## 2024-07-18 LAB — CRP SERPL HS-MCNC: 3 MG/L

## 2024-07-18 PROCEDURE — 86140 C-REACTIVE PROTEIN: CPT

## 2024-07-18 PROCEDURE — 36415 COLL VENOUS BLD VENIPUNCTURE: CPT

## 2024-07-18 PROCEDURE — 83993 ASSAY FOR CALPROTECTIN FECAL: CPT

## 2024-07-19 ENCOUNTER — INITIAL CONSULT (OUTPATIENT)
Dept: CARDIOLOGY CLINIC | Age: 49
End: 2024-07-19
Payer: COMMERCIAL

## 2024-07-19 VITALS
BODY MASS INDEX: 21.31 KG/M2 | HEART RATE: 50 BPM | DIASTOLIC BLOOD PRESSURE: 66 MMHG | SYSTOLIC BLOOD PRESSURE: 116 MMHG | WEIGHT: 124.8 LBS | HEIGHT: 64 IN

## 2024-07-19 DIAGNOSIS — R00.1 SINUS BRADYCARDIA: Primary | ICD-10-CM

## 2024-07-19 DIAGNOSIS — R00.1 BRADYCARDIA: ICD-10-CM

## 2024-07-19 PROCEDURE — 93000 ELECTROCARDIOGRAM COMPLETE: CPT | Performed by: INTERNAL MEDICINE

## 2024-07-19 PROCEDURE — 99203 OFFICE O/P NEW LOW 30 MIN: CPT | Performed by: INTERNAL MEDICINE

## 2024-07-19 NOTE — PROGRESS NOTES
Dimple called this am. She continues to take lasix 20 mg once daily. Her weight is 149 (baseline is 150). She states swelling seems worse. Continues to have sob on exertion and c/o of fatigue. She saw her endocrinologist and did scan on 7/3. She is doing repeat blood work with them later this week. She stated she was diagnosed with Graves disease. Her next f/u with us is with Elizabeth on 7/23. After discussing with NELSON Allred CNP the following suggestions given:     Date: 7/9/2018    Time of Call: 12:05 PM     Diagnosis:  Heart failure     [ TORB ] Ordering provider: Elizabeth DAMON CNP    Order: Make an appointment with your primary physician to be evaluated.      Order received by: Kayli Olsen RN      Follow-up/additional notes: Orders communicated to patient. Patient verbalized an understanding.          free to contact the dictating provider for clarification.

## 2024-07-21 LAB — CALPROTECTIN STL-MCNT: 10 UG/G

## 2024-08-12 ASSESSMENT — ENCOUNTER SYMPTOMS
ABDOMINAL PAIN: 0
CHEST TIGHTNESS: 0
DIARRHEA: 0
SHORTNESS OF BREATH: 0
BLOOD IN STOOL: 0
COUGH: 0
EYE PAIN: 0
BACK PAIN: 0
VOMITING: 0
NAUSEA: 0
COLOR CHANGE: 0
WHEEZING: 0
CONSTIPATION: 0
PHOTOPHOBIA: 0

## 2024-08-19 ENCOUNTER — TELEPHONE (OUTPATIENT)
Dept: CARDIOLOGY CLINIC | Age: 49
End: 2024-08-19

## 2024-08-19 NOTE — TELEPHONE ENCOUNTER
----- Message from GINA LAINEZ LPN sent at 8/19/2024 11:40 AM EDT -----  Normal results from 8/6 but interpretation summary states f/u with primary cardiologist. Pt has only seen Dr. Pena here. Not sure if they need scheduled with one of the other providers.     Interpretation Summary  Show Result Comparison   ·  ECG: Resting ECG demonstrates sinus bradycardia.  ·  ECG: The stress ECG was negative for ischemia.  ·  Stress Test: A Eric protocol stress test was performed. Overall, the patient's exercise capacity was average for their age. The patient was stressed for 10 min and 0 sec. The patient experienced no angina during the test. The patient reported no symptoms during the stress test. Hemodynamics are adequate for diagnosis. Blood pressure demonstrated a normal response and heart rate demonstrated a normal response to stress. The patient's heart rate recovery was normal.           Normal exercise stress test  Normal hemodynamic response   Stress test negative for ischemia     Outpatient follow-up with primary cardiologist

## 2024-08-19 NOTE — TELEPHONE ENCOUNTER
Called patient, and advised per Cortney, all testing was WNL, she stated her low HR was probably related to her being on pain medication and that patient is so active. Patient can f/u with Cortney or a keith jones. Patient stated she is feeling so much better and would like to only f/u with anyone if she would start to feel bad again. Advised patient that was fine and to call me back with any new concerns or c/o. Patient stated understanding.

## 2024-10-21 ENCOUNTER — APPOINTMENT (OUTPATIENT)
Dept: ULTRASOUND IMAGING | Age: 49
End: 2024-10-21
Payer: COMMERCIAL

## 2024-10-21 ENCOUNTER — APPOINTMENT (OUTPATIENT)
Dept: CT IMAGING | Age: 49
End: 2024-10-21
Payer: COMMERCIAL

## 2024-10-21 ENCOUNTER — HOSPITAL ENCOUNTER (EMERGENCY)
Age: 49
Discharge: HOME | End: 2024-10-21
Attending: EMERGENCY MEDICINE
Payer: COMMERCIAL

## 2024-10-21 VITALS
RESPIRATION RATE: 18 BRPM | DIASTOLIC BLOOD PRESSURE: 78 MMHG | WEIGHT: 120 LBS | BODY MASS INDEX: 19.99 KG/M2 | HEART RATE: 64 BPM | SYSTOLIC BLOOD PRESSURE: 112 MMHG | HEIGHT: 65 IN | TEMPERATURE: 97.5 F | OXYGEN SATURATION: 98 %

## 2024-10-21 DIAGNOSIS — K56.609 SBO (SMALL BOWEL OBSTRUCTION) (HCC): Primary | ICD-10-CM

## 2024-10-21 DIAGNOSIS — K56.600 PARTIAL SMALL BOWEL OBSTRUCTION (HCC): ICD-10-CM

## 2024-10-21 DIAGNOSIS — R10.30 LOWER ABDOMINAL PAIN: ICD-10-CM

## 2024-10-21 LAB
ALBUMIN SERPL-MCNC: 4.5 G/DL (ref 3.4–5)
ALBUMIN/GLOB SERPL: 1.4 {RATIO} (ref 1.1–2.2)
ALP SERPL-CCNC: 51 U/L (ref 40–129)
ALT SERPL-CCNC: 17 U/L (ref 10–40)
ANION GAP SERPL CALCULATED.3IONS-SCNC: 15 MMOL/L (ref 9–17)
AST SERPL-CCNC: 20 U/L (ref 15–37)
B-HCG SERPL EIA 3RD IS-ACNC: 5.9 MIU/ML
BASOPHILS # BLD: 0.03 K/UL
BASOPHILS NFR BLD: 0 % (ref 0–1)
BILIRUB SERPL-MCNC: 0.8 MG/DL (ref 0–1)
BUN SERPL-MCNC: 19 MG/DL (ref 7–20)
CALCIUM SERPL-MCNC: 10.1 MG/DL (ref 8.3–10.6)
CHLORIDE SERPL-SCNC: 102 MMOL/L (ref 99–110)
CO2 SERPL-SCNC: 23 MMOL/L (ref 21–32)
CREAT SERPL-MCNC: 0.8 MG/DL (ref 0.6–1.1)
EOSINOPHIL # BLD: 0 K/UL
EOSINOPHILS RELATIVE PERCENT: 0 % (ref 0–3)
ERYTHROCYTE [DISTWIDTH] IN BLOOD BY AUTOMATED COUNT: 12.6 % (ref 11.7–14.9)
GFR, ESTIMATED: 81 ML/MIN/1.73M2
GLUCOSE SERPL-MCNC: 115 MG/DL (ref 74–99)
HCT VFR BLD AUTO: 47.6 % (ref 37–47)
HGB BLD-MCNC: 16.3 G/DL (ref 12.5–16)
IMM GRANULOCYTES # BLD AUTO: 0.02 K/UL
IMM GRANULOCYTES NFR BLD: 0 %
LACTATE BLDV-SCNC: 1.9 MMOL/L (ref 0.4–2)
LIPASE SERPL-CCNC: 32 U/L (ref 13–60)
LYMPHOCYTES NFR BLD: 1 K/UL
LYMPHOCYTES RELATIVE PERCENT: 10 % (ref 24–44)
MCH RBC QN AUTO: 32.7 PG (ref 27–31)
MCHC RBC AUTO-ENTMCNC: 34.2 G/DL (ref 32–36)
MCV RBC AUTO: 95.4 FL (ref 78–100)
MONOCYTES NFR BLD: 0.4 K/UL
MONOCYTES NFR BLD: 4 % (ref 0–4)
NEUTROPHILS NFR BLD: 85 % (ref 36–66)
NEUTS SEG NFR BLD: 8.29 K/UL
PLATELET # BLD AUTO: 233 K/UL (ref 140–440)
PMV BLD AUTO: 12 FL (ref 7.5–11.1)
POTASSIUM SERPL-SCNC: 4 MMOL/L (ref 3.5–5.1)
PROT SERPL-MCNC: 7.6 G/DL (ref 6.4–8.2)
RBC # BLD AUTO: 4.99 M/UL (ref 4.2–5.4)
SODIUM SERPL-SCNC: 140 MMOL/L (ref 136–145)
WBC OTHER # BLD: 9.7 K/UL (ref 4–10.5)

## 2024-10-21 PROCEDURE — 74177 CT ABD & PELVIS W/CONTRAST: CPT

## 2024-10-21 PROCEDURE — 83690 ASSAY OF LIPASE: CPT

## 2024-10-21 PROCEDURE — 99285 EMERGENCY DEPT VISIT HI MDM: CPT

## 2024-10-21 PROCEDURE — 80053 COMPREHEN METABOLIC PANEL: CPT

## 2024-10-21 PROCEDURE — 2580000003 HC RX 258: Performed by: PHYSICIAN ASSISTANT

## 2024-10-21 PROCEDURE — 6360000002 HC RX W HCPCS: Performed by: PHYSICIAN ASSISTANT

## 2024-10-21 PROCEDURE — 76817 TRANSVAGINAL US OBSTETRIC: CPT

## 2024-10-21 PROCEDURE — 93975 VASCULAR STUDY: CPT

## 2024-10-21 PROCEDURE — 85025 COMPLETE CBC W/AUTO DIFF WBC: CPT

## 2024-10-21 PROCEDURE — 84702 CHORIONIC GONADOTROPIN TEST: CPT

## 2024-10-21 PROCEDURE — 76857 US EXAM PELVIC LIMITED: CPT

## 2024-10-21 PROCEDURE — 96361 HYDRATE IV INFUSION ADD-ON: CPT

## 2024-10-21 PROCEDURE — 6360000004 HC RX CONTRAST MEDICATION: Performed by: PHYSICIAN ASSISTANT

## 2024-10-21 PROCEDURE — 96375 TX/PRO/DX INJ NEW DRUG ADDON: CPT

## 2024-10-21 PROCEDURE — 83605 ASSAY OF LACTIC ACID: CPT

## 2024-10-21 RX ORDER — DROPERIDOL 2.5 MG/ML
1.25 INJECTION, SOLUTION INTRAMUSCULAR; INTRAVENOUS ONCE
Status: COMPLETED | OUTPATIENT
Start: 2024-10-21 | End: 2024-10-21

## 2024-10-21 RX ORDER — KETOROLAC TROMETHAMINE 15 MG/ML
15 INJECTION, SOLUTION INTRAMUSCULAR; INTRAVENOUS ONCE
Status: COMPLETED | OUTPATIENT
Start: 2024-10-21 | End: 2024-10-21

## 2024-10-21 RX ORDER — SODIUM CHLORIDE, SODIUM LACTATE, POTASSIUM CHLORIDE, AND CALCIUM CHLORIDE .6; .31; .03; .02 G/100ML; G/100ML; G/100ML; G/100ML
1000 INJECTION, SOLUTION INTRAVENOUS ONCE
Status: COMPLETED | OUTPATIENT
Start: 2024-10-21 | End: 2024-10-21

## 2024-10-21 RX ORDER — IOPAMIDOL 755 MG/ML
75 INJECTION, SOLUTION INTRAVASCULAR
Status: COMPLETED | OUTPATIENT
Start: 2024-10-21 | End: 2024-10-21

## 2024-10-21 RX ADMIN — IOPAMIDOL 75 ML: 755 INJECTION, SOLUTION INTRAVENOUS at 18:31

## 2024-10-21 RX ADMIN — KETOROLAC TROMETHAMINE 15 MG: 15 INJECTION, SOLUTION INTRAMUSCULAR; INTRAVENOUS at 12:26

## 2024-10-21 RX ADMIN — SODIUM CHLORIDE, POTASSIUM CHLORIDE, SODIUM LACTATE AND CALCIUM CHLORIDE 1000 ML: 600; 310; 30; 20 INJECTION, SOLUTION INTRAVENOUS at 12:37

## 2024-10-21 RX ADMIN — DROPERIDOL 1.25 MG: 2.5 INJECTION, SOLUTION INTRAMUSCULAR; INTRAVENOUS at 12:26

## 2024-10-21 ASSESSMENT — PAIN SCALES - GENERAL
PAINLEVEL_OUTOF10: 9
PAINLEVEL_OUTOF10: 5
PAINLEVEL_OUTOF10: 9

## 2024-10-21 ASSESSMENT — PAIN DESCRIPTION - PAIN TYPE: TYPE: ACUTE PAIN

## 2024-10-21 ASSESSMENT — PAIN DESCRIPTION - LOCATION
LOCATION: ABDOMEN

## 2024-10-21 ASSESSMENT — PAIN DESCRIPTION - DESCRIPTORS: DESCRIPTORS: SHARP

## 2024-10-21 ASSESSMENT — LIFESTYLE VARIABLES
HOW MANY STANDARD DRINKS CONTAINING ALCOHOL DO YOU HAVE ON A TYPICAL DAY: PATIENT DOES NOT DRINK
HOW OFTEN DO YOU HAVE A DRINK CONTAINING ALCOHOL: NEVER

## 2024-10-21 ASSESSMENT — PAIN - FUNCTIONAL ASSESSMENT
PAIN_FUNCTIONAL_ASSESSMENT: NONE - DENIES PAIN
PAIN_FUNCTIONAL_ASSESSMENT: 0-10

## 2024-10-21 NOTE — ED TRIAGE NOTES
Pt to the ED via walk in from home with abdominal pain that started on Saturday 10/19/24 while on a vacation in [place]. Pt states she got done running 8 miles on a treadmill and the pain suddenly started and has not stopped. Pts  states he took her to the emergency room in [place] where they did a full work up including a CT that all came back normal.pt states she seen her GI doctor this morning (Dr Doran) and states he sent her here to be admitted and wants the patient to have another full work up with another CT as well. Pt is alert and oriented, is not in acute distress, has  with her, and denies any needs

## 2024-10-21 NOTE — ED PROVIDER NOTES
[facility] EMERGENCY DEPARTMENT  EMERGENCY DEPARTMENT ENCOUNTER        Pt Name: Rut Coronado  MRN: [id number]  Birthdate 1975  Date of evaluation: 10/21/2024  Provider: Rashaad Schultz PA-C  PCP: Rashaad Gaxiola MD  Note Started: 12:08 PM EDT 10/21/24       I have seen and evaluated this patient with my supervising physician Mago Hoffman DO.      CHIEF COMPLAINT       Chief Complaint   Patient presents with    Abdominal Pain     Patient sent by Dr. Doran's office due to vomiting and abdominal pain for the past couple of days.       HISTORY OF PRESENT ILLNESS: 1 or more Elements     History From: patient    Rut Coronado is a 49 y.o. female with past medical history of prior colon resection, prior bowel obstruction who presents complaining of abdominal pain and vomiting for 3 days.  Patient reports she was in [place] on Saturday, 3 days ago, started having lower abdominal pain and vomiting.  Last BM was Saturday as well.  She went to the ER, had a CT that was negative, given fluids and nausea medicine.  They drove back home yesterday.  She saw GI today, Dr. Doran, who directed her to the ER for fluids, pain medication, reevaluation and possible admission.  Patient was discharged with Zofran but has not taken it because she did not want to throw it up.  She has decreased oral intake since Saturday due to nausea and vomiting. Denies changes in urination or flank pain.     Pregnancy test positive, serum quant 5.9.  Patient reports last menstrual cycle was many months ago, she has been going through menopause.  Her and  have low concern for pregnancy, states that he had fertility issues for over 20 years and were never able to get pregnant when trying.    Nursing Notes were all reviewed and agreed with or any disagreements were addressed in the HPI.    REVIEW OF SYSTEMS :      Review of Systems   All other systems reviewed and are 
is NOT to be included for SEP-1 Core Measure due to:  Alternative explanation for abnormal labs/vitals that do not relate to sepsis, see MDM for further explanation        All diagnostic, treatment, and disposition decisions were made by myself in conjunction with the advanced practice provider.    I personally saw the patient and made/approved the management plan and take responsibility for the patient management    For all further details of the patient's emergency department visit, please see the advanced practice provider's documentation.    Comment: Please note this report has been produced using speech recognition software and may contain errors related to that system including errors in grammar, punctuation, and spelling, as well as words and phrases that may be inappropriate. If there are any questions or concerns please feel free to contact the dictating provider for clarification.        Mago Hoffman DO  10/22/24 [id number]

## 2024-10-22 ENCOUNTER — HOSPITAL ENCOUNTER (OUTPATIENT)
Age: 49
Setting detail: SPECIMEN
Discharge: HOME | End: 2024-10-22

## 2024-10-22 LAB
ANION GAP SERPL CALCULATED.3IONS-SCNC: 13 MMOL/L (ref 9–17)
BUN SERPL-MCNC: 22 MG/DL (ref 7–20)
CALCIUM SERPL-MCNC: 9.4 MG/DL (ref 8.3–10.6)
CHLORIDE SERPL-SCNC: 104 MMOL/L (ref 99–110)
CO2 SERPL-SCNC: 22 MMOL/L (ref 21–32)
CREAT SERPL-MCNC: 0.6 MG/DL (ref 0.6–1.1)
ERYTHROCYTE [DISTWIDTH] IN BLOOD BY AUTOMATED COUNT: 12.6 % (ref 11.7–14.9)
GFR, ESTIMATED: >90 ML/MIN/1.73M2
GLUCOSE SERPL-MCNC: 96 MG/DL (ref 74–99)
HCT VFR BLD AUTO: 44.1 % (ref 37–47)
HGB BLD-MCNC: 14.8 G/DL (ref 12.5–16)
MCH RBC QN AUTO: 32.5 PG (ref 27–31)
MCHC RBC AUTO-ENTMCNC: 33.6 G/DL (ref 32–36)
MCV RBC AUTO: 96.9 FL (ref 78–100)
PLATELET # BLD AUTO: 142 K/UL (ref 140–440)
PMV BLD AUTO: 12.9 FL (ref 7.5–11.1)
POTASSIUM SERPL-SCNC: 3.7 MMOL/L (ref 3.5–5.1)
RBC # BLD AUTO: 4.55 M/UL (ref 4.2–5.4)
SODIUM SERPL-SCNC: 139 MMOL/L (ref 136–145)
WBC OTHER # BLD: 8.2 K/UL (ref 4–10.5)

## 2024-10-22 PROCEDURE — 80048 BASIC METABOLIC PNL TOTAL CA: CPT

## 2024-10-22 PROCEDURE — 85027 COMPLETE CBC AUTOMATED: CPT

## 2024-10-22 NOTE — ED NOTES
Medication History  [facility]    Patient Name: Rut Coronado 1975     Medication history has been completed by: Susannah Oseguera CPhT    Source(s) of information: patient     Primary Care Physician: Rashaad Gaxiola MD     Pharmacy: [facility]    Allergies as of 10/21/2024 - Fully Reviewed 10/21/2024   Allergen Reaction Noted    Sulfamethoxazole  08/22/2019    Tetracyclines & related Rash 10/16/2013        Prior to Admission medications    Medication Sig Start Date End Date Taking? Authorizing Provider     Medications removed from list (include reason, ex. noncompliance, medication cost, therapy complete etc.):   Cholestyramine not taking  Dicyclomine not taking  Pantoprazole not taking  Simethicone not taking    Comments:  Patient reports she is taking no medications.    To my knowledge the above medication history is accurate as of 10/21/2024 12:46 PM.   Susannah Oseguera CPhT   10/21/2024 12:46 PM     
Pt states that she is postmenopausal, HCG Quant is 5.9. Trasnsvag US showed no gestational sac or fetus. Pt is aware of this and is ok to proceed with the CT scan.  
Sending RN at [phone]    Electronically signed by: Electronically signed by Giselle Springer RN on 10/21/2024 at 8:12 PM

## 2024-12-17 ENCOUNTER — COMMUNITY OUTREACH (OUTPATIENT)
Dept: FAMILY MEDICINE CLINIC | Age: 49
End: 2024-12-17

## 2024-12-17 NOTE — PROGRESS NOTES
Patient's HM shows they are overdue for Mammogram.  Care Everywhere and  files searched.   updated with 8/8/23 Mammogram.

## (undated) DEVICE — GLOVE SURG SZ 65 L12IN FNGR THK87MIL WHT LTX FREE

## (undated) DEVICE — STRIP WND PK W0.25INXL5YD IODO GZ TIGHTLY WVN CURAD

## (undated) DEVICE — SUTURE PROL SZ 3-0 L36IN NONABSORBABLE BLU L26MM SH 1/2 CIR 8522H

## (undated) DEVICE — STAPLER INT L75MM CUT LN L73MM STPL LN L77MM BLU B FRM 8

## (undated) DEVICE — GLOVE SURG SZ 7 L12IN FNGR THK87MIL WHT LTX FREE

## (undated) DEVICE — SUTURE VCRL SZ 4-0 L18IN ABSRB UD RB-1 L17MM 1/2 CIR J714D

## (undated) DEVICE — 2-PIECE OSTOMY SKIN BARRIER, CERAPLUS: Brand: NEW IMAGE

## (undated) DEVICE — YANKAUER,FLEXIBLE HANDLE,REGLR CAPACITY: Brand: MEDLINE INDUSTRIES, INC.

## (undated) DEVICE — CORD ES L15FT PT RET REUSE VALLEYLAB REM

## (undated) DEVICE — ELECTRODE ES AD CRDLSS PT RET REM POLYHESIVE

## (undated) DEVICE — TOWEL,OR,DSP,ST,BLUE,STD,6/PK,12PK/CS: Brand: MEDLINE

## (undated) DEVICE — GOWN,SIRUS,POLYRNF,BRTHSLV,XLN/XL,20/CS: Brand: MEDLINE

## (undated) DEVICE — CHLORAPREP 26ML ORANGE

## (undated) DEVICE — SPONGE GZ W4XL8IN COT WVN 12 PLY

## (undated) DEVICE — ANESTHESIA CIRCUIT ADULT-LF: Brand: MEDLINE INDUSTRIES, INC.

## (undated) DEVICE — SUTURE PROL SZ 1 L30IN NONABSORBABLE BLU L36MM CT-1 1/2 CIR 8425H

## (undated) DEVICE — SUTURE PROL SZ 1 L30IN NONABSORBABLE BLU CTX L48MM 1/2 CIR 8455H

## (undated) DEVICE — KIT CVC AD 7FR L20CM POLYUR BLU FLEXTIP ANTIMIC MULTILUMEN

## (undated) DEVICE — SEALER ENDOSCP NANO COAT OPN DIV CRV L JAW LIGASURE IMPACT

## (undated) DEVICE — INTENDED FOR TISSUE SEPARATION, AND OTHER PROCEDURES THAT REQUIRE A SHARP SURGICAL BLADE TO PUNCTURE OR CUT.: Brand: BARD-PARKER ® STAINLESS STEEL BLADES

## (undated) DEVICE — GLOVE SURG SZ 65 THK91MIL LTX FREE SYN POLYISOPRENE

## (undated) DEVICE — PENCIL ES CRD L10FT HND SWCHING ROCK SWCH W/ EDGE COAT BLDE

## (undated) DEVICE — RELOAD STPL L75MM OPN H3.8MM CLS 1.5MM WIRE DIA0.2MM REG

## (undated) DEVICE — SOLUTION IV IRRIG POUR BRL 0.9% SODIUM CHL 2F7124

## (undated) DEVICE — TROCAR ENDOSCP SHFT L100MM DIA5MM DIL TIP ENDOPATH XCEL

## (undated) DEVICE — COVER US PRB W15XL120CM W/ GEL RUBBERBAND TAPE STRP FLD GEN

## (undated) DEVICE — DRAPE SHEET ULTRAGARD: Brand: MEDLINE

## (undated) DEVICE — PACK SURG LAP CHOLE

## (undated) DEVICE — 2-PIECE DRAINABLE OSTOMY POUCH: Brand: NEW IMAGE

## (undated) DEVICE — SUTURE ETHBND EXCEL SZ 0 L18IN NONABSORBABLE GRN L26MM CT-2 CX27D

## (undated) DEVICE — SOLUTION IV 1000ML 0.9% SOD CHL FOR IRRIG PLAS CONT

## (undated) DEVICE — TROCAR ENDOSCP L100MM DIA12MM BLDELSS OBT RADLUC STBL SL

## (undated) DEVICE — Device

## (undated) DEVICE — SUTURE MCRYL SZ 4-0 L27IN ABSRB UD RB-1 L17MM 1/2 CIR Y214H

## (undated) DEVICE — FORCEPS LAP DIA5MM BCOCK FEN TIP ROT NONARTICULATING LOK

## (undated) DEVICE — DRESSING TRNSPAR W2XL2.75IN FLM SHT SEMIPERMEABLE WIND

## (undated) DEVICE — SUTURE PERMAHAND SZ 3-0 L18IN NONABSORBABLE BLK L26MM SH C013D

## (undated) DEVICE — GARMENT,MEDLINE,DVT,INT,CALF,MED, GEN2: Brand: MEDLINE

## (undated) DEVICE — SYRINGE MED 3ML CLR PLAS STD N CTRL LUERLOCK TIP DISP

## (undated) DEVICE — TROCAR ENDOSCP L100MM DIA5MM BLDELSS STBL SL THRD OPT VW

## (undated) DEVICE — DUAL LUMEN STOMACH TUBE: Brand: SALEM SUMP

## (undated) DEVICE — SPONGE LAP W18XL18IN WHT COT 4 PLY FLD STRUNG RADPQ DISP ST

## (undated) DEVICE — TROCAR ENDOSCP L150MM DIA5MM BLDELSS STBL SL CAM PRT W/

## (undated) DEVICE — TUBING, SUCTION, 9/32" X 10', STRAIGHT: Brand: MEDLINE

## (undated) DEVICE — LINER SUCT CANSTR 1500CC SEMI RIG W/ POR HYDROPHOBIC SHUT

## (undated) DEVICE — COUNTER NDL 60 COUNT FOAM STRP SGL MAG

## (undated) DEVICE — GLOVE ORANGE PI 7 1/2   MSG9075

## (undated) DEVICE — TOTAL TRAY, DB, 100% SILI FOLEY, 16FR 10: Brand: MEDLINE

## (undated) DEVICE — SOLUTION IV IRRIG WATER 1000ML POUR BRL 2F7114

## (undated) DEVICE — TUBING INSUFFLATOR HEAT HI FLO SET PNEUMOCLEAR

## (undated) DEVICE — 2-PIECE OSTOMY SKIN BARRIER, CONVEX, CERAPLUS: Brand: NEW IMAGE